# Patient Record
Sex: FEMALE | Race: WHITE | NOT HISPANIC OR LATINO | Employment: OTHER | URBAN - NONMETROPOLITAN AREA
[De-identification: names, ages, dates, MRNs, and addresses within clinical notes are randomized per-mention and may not be internally consistent; named-entity substitution may affect disease eponyms.]

---

## 2017-01-13 RX ORDER — LEVOTHYROXINE SODIUM 0.03 MG/1
TABLET ORAL
Qty: 30 TABLET | Refills: 6 | Status: SHIPPED | OUTPATIENT
Start: 2017-01-13 | End: 2017-07-14 | Stop reason: SDUPTHER

## 2017-01-13 RX ORDER — ALENDRONATE SODIUM 10 MG/1
TABLET ORAL
Qty: 30 TABLET | Refills: 0 | Status: SHIPPED | OUTPATIENT
Start: 2017-01-13 | End: 2017-02-09

## 2017-02-09 ENCOUNTER — OFFICE VISIT (OUTPATIENT)
Dept: FAMILY MEDICINE CLINIC | Facility: CLINIC | Age: 82
End: 2017-02-09

## 2017-02-09 VITALS
DIASTOLIC BLOOD PRESSURE: 78 MMHG | HEART RATE: 74 BPM | WEIGHT: 115.6 LBS | TEMPERATURE: 98.3 F | SYSTOLIC BLOOD PRESSURE: 124 MMHG | HEIGHT: 63 IN | BODY MASS INDEX: 20.48 KG/M2 | OXYGEN SATURATION: 98 %

## 2017-02-09 DIAGNOSIS — Z91.09 ENVIRONMENTAL ALLERGIES: ICD-10-CM

## 2017-02-09 DIAGNOSIS — M85.80 SENILE OSTEOPENIA: ICD-10-CM

## 2017-02-09 DIAGNOSIS — G89.29 CHRONIC BACK PAIN, UNSPECIFIED BACK LOCATION, UNSPECIFIED BACK PAIN LATERALITY: Primary | ICD-10-CM

## 2017-02-09 DIAGNOSIS — M25.551 ARTHRALGIA OF RIGHT HIP: ICD-10-CM

## 2017-02-09 DIAGNOSIS — M54.41 ACUTE MIDLINE LOW BACK PAIN WITH RIGHT-SIDED SCIATICA: ICD-10-CM

## 2017-02-09 DIAGNOSIS — M54.9 CHRONIC BACK PAIN, UNSPECIFIED BACK LOCATION, UNSPECIFIED BACK PAIN LATERALITY: Primary | ICD-10-CM

## 2017-02-09 PROCEDURE — 99214 OFFICE O/P EST MOD 30 MIN: CPT | Performed by: NURSE PRACTITIONER

## 2017-02-09 RX ORDER — HYDROCODONE BITARTRATE AND ACETAMINOPHEN 5; 325 MG/1; MG/1
1 TABLET ORAL 2 TIMES DAILY
Qty: 60 TABLET | Refills: 0 | Status: SHIPPED | OUTPATIENT
Start: 2017-02-09 | End: 2017-03-11

## 2017-02-09 RX ORDER — LORATADINE 10 MG/1
10 TABLET ORAL DAILY
Qty: 30 TABLET | Refills: 6 | Status: SHIPPED | OUTPATIENT
Start: 2017-02-09 | End: 2017-09-16 | Stop reason: SDUPTHER

## 2017-02-09 RX ORDER — GABAPENTIN 100 MG/1
100 CAPSULE ORAL 2 TIMES DAILY
Qty: 60 CAPSULE | Refills: 0 | Status: SHIPPED | OUTPATIENT
Start: 2017-02-09 | End: 2017-08-16 | Stop reason: SDUPTHER

## 2017-02-09 NOTE — PROGRESS NOTES
Subjective   Keyana Nguyen is a 89 y.o. female. Patient is here today for back pain. She states this is all over her back. This has been going on for about a year. The pain is not constant and she has done physical therapy twice but she feels like the pain has gotten worse recently and more spread out.     History of Present Illness 89-year-old  female presents to the office today with complaint of diffuse back pain that encompasses her entire back. She states it has been going on for approximately a year. Patient has done physical therapy twice but feels as though the pain is gotten worse recently. Has treated with tylenol, helps only for a few hours. Nothing makes better and movement makes worse. On a scale 1-10 rates her pain as 7-8, realizes she will have this pain for the rest of her life. Patient is in total control of her faculties. States she will only use the medicine when the pain is moderate to high and is always cautious of her surroundings . Patient also has senile osteopenia coupled with advanced age makes her pain very obvious in her day-to-day life.  Pt wants to decrease any meds that she can, we reviewed all meds and made adjustment. Previously diagnosed with gastric reflux patient no longer wishes to take that medicine protonics to be discontinued per patient request    Allergies, medications, problem list, medical history, surgical history, family history, social history, relevant encounters, immunizations, health maintenance all reviewed during this visit    Review of Systems   Constitutional: Positive for fatigue.   Musculoskeletal: Positive for arthralgias, back pain, gait problem and myalgias.        Osteopenia   All other systems reviewed and are negative.      Objective   Physical Exam   Constitutional: She is oriented to person, place, and time. She appears well-developed and well-nourished.   HENT:   Head: Normocephalic and atraumatic.   Eyes: EOM are normal. Pupils are equal,  "round, and reactive to light.   Neck: Normal range of motion.   Cardiovascular: Normal rate and regular rhythm.    Pulmonary/Chest: Effort normal and breath sounds normal.   Abdominal: Soft. Bowel sounds are normal.   Musculoskeletal:   Back pain is diffuse throughout her back still has some sciatica intermittently. States she understands this will be permanent for her lifetime. Is at peace with that. Realizes that she is starting to as she put it \"wind down \".   Neurological: She is alert and oriented to person, place, and time.   Fully intact knows exactly what she wants and what she does not want. Knows all of her medications, what they are for, and when they are due.   Skin: Skin is warm and dry.   Psychiatric: She has a normal mood and affect. Her behavior is normal. Judgment and thought content normal.   Nursing note and vitals reviewed.      Assessment/Plan   Keyana was seen today for back pain.    Diagnoses and all orders for this visit:    Chronic back pain, unspecified back location, unspecified back pain laterality  -     HYDROcodone-acetaminophen (NORCO) 5-325 MG per tablet; Take 1 tablet by mouth 2 (Two) Times a Day for 30 days.  -     gabapentin (NEURONTIN) 100 MG capsule; Take 1 capsule by mouth 2 (Two) Times a Day.  -     Compliance Drug Analysis, Ur; Future    Acute midline low back pain with right-sided sciatica  -     HYDROcodone-acetaminophen (NORCO) 5-325 MG per tablet; Take 1 tablet by mouth 2 (Two) Times a Day for 30 days.  -     Compliance Drug Analysis, Ur; Future    Senile osteopenia    Arthralgia of right hip  -     HYDROcodone-acetaminophen (NORCO) 5-325 MG per tablet; Take 1 tablet by mouth 2 (Two) Times a Day for 30 days.    Environmental allergies  -     loratadine (CLARITIN) 10 MG tablet; Take 1 tablet by mouth Daily.     Will treat patient's chronic pain with hydrocodone 5/325 mg twice a day- once in the morning to get her through her day and then before bed so she can sleep. " Patient has asked that her gabapentin be cut down from 3 tablets a day to twice a day. I agreed and wrote the new prescription. Patient discontinued medicines for osteopenia because she feels that she is starting to slow down and would not benefit by taking it. Also stopped her meloxicam since she has the Norco and gabapentin; stated she did not feel she would need the extra meds. Patient also discontinued Protonix since she said she is not plagued with heartburn and since she is not taking the meloxicam could not justify the cost. Patient did request Claritin for her allergic rhinitis. Patient also discussed with me getting a handicap placard since her back pain is such that its difficult for her to walk into the store from the back of the parking lot. She will go to the courthouse get the form and I will sign. Will follow-up every 6 months for routine meds and as needed on the Norco. Drug urine test to be collected when she can void. Ian ordered and med agreement current.    Much of this encounter note is an electronic transcription/translation of spoken language to printed text. The electronic translation of spoken language may permit erroneous, or at times, nonsensical words or phrases to be inadvertently transcribed; although I have reviewed the note for such errors, some may still exist.

## 2017-05-10 ENCOUNTER — OFFICE VISIT (OUTPATIENT)
Dept: CARDIOLOGY | Facility: CLINIC | Age: 82
End: 2017-05-10

## 2017-05-10 VITALS
DIASTOLIC BLOOD PRESSURE: 78 MMHG | HEART RATE: 81 BPM | HEIGHT: 63 IN | WEIGHT: 118 LBS | SYSTOLIC BLOOD PRESSURE: 130 MMHG | BODY MASS INDEX: 20.91 KG/M2

## 2017-05-10 DIAGNOSIS — I35.1 NONRHEUMATIC AORTIC VALVE INSUFFICIENCY: ICD-10-CM

## 2017-05-10 DIAGNOSIS — I48.20 CHRONIC ATRIAL FIBRILLATION (HCC): Primary | ICD-10-CM

## 2017-05-10 DIAGNOSIS — R53.82 CHRONIC FATIGUE: ICD-10-CM

## 2017-05-10 DIAGNOSIS — I34.0 NON-RHEUMATIC MITRAL REGURGITATION: ICD-10-CM

## 2017-05-10 PROCEDURE — 93000 ELECTROCARDIOGRAM COMPLETE: CPT | Performed by: NURSE PRACTITIONER

## 2017-05-10 PROCEDURE — 99214 OFFICE O/P EST MOD 30 MIN: CPT | Performed by: NURSE PRACTITIONER

## 2017-05-11 PROBLEM — I48.92 ATRIAL FLUTTER: Status: ACTIVE | Noted: 2017-05-11

## 2017-06-05 ENCOUNTER — OFFICE VISIT (OUTPATIENT)
Dept: FAMILY MEDICINE CLINIC | Facility: CLINIC | Age: 82
End: 2017-06-05

## 2017-06-05 VITALS
BODY MASS INDEX: 21.4 KG/M2 | WEIGHT: 120.8 LBS | TEMPERATURE: 98.4 F | HEIGHT: 63 IN | SYSTOLIC BLOOD PRESSURE: 112 MMHG | OXYGEN SATURATION: 97 % | DIASTOLIC BLOOD PRESSURE: 80 MMHG | HEART RATE: 80 BPM

## 2017-06-05 DIAGNOSIS — Z00.00 MEDICARE ANNUAL WELLNESS VISIT, INITIAL: Primary | ICD-10-CM

## 2017-06-05 PROCEDURE — G0438 PPPS, INITIAL VISIT: HCPCS | Performed by: NURSE PRACTITIONER

## 2017-06-05 NOTE — PATIENT INSTRUCTIONS
Medicare Wellness  Personal Prevention Plan of Service     Date of Office Visit:  2017  Encounter Provider:  DUSTIN Navarro  Place of Service:  [facility]  Patient Name: Keyana Nguyen  :  1925    As part of the Medicare Wellness portion of your visit today, we are providing you with this personalized preventive plan of services (PPPS). This plan is based upon recommendations of the [place] Preventive Services Task Force (USPSTF) and the Advisory Committee on Immunization Practices (ACIP).    This lists the preventive care services that should be considered, and provides dates of when you are due. Items listed as completed are up-to-date and do not require any further intervention.    Health Maintenance   Topic Date Due   • TDAP/TD VACCINES (1 - Tdap) 1944   • PNEUMOCOCCAL VACCINES (65+ LOW/MEDIUM RISK) (1 of 2 - PCV13) 1990   • ZOSTER VACCINE  2016   • INFLUENZA VACCINE  2017   • MAMMOGRAM  2018   • DXA SCAN  2018       No orders of the defined types were placed in this encounter.    Patient will follow-up with office every 6 months and as needed.

## 2017-06-05 NOTE — PROGRESS NOTES
QUICK REFERENCE INFORMATION:  The ABCs of the Annual Wellness Visit    Subsequent Medicare Wellness Visit    HEALTH RISK ASSESSMENT    11/20/1925    Recent Hospitalizations:  No hospitalization(s) within the last year..        Current Medical Providers:  Patient Care Team:  DUSTIN Dick as PCP - General  DUSTIN Dick as PCP - Family Medicine        Smoking Status:  History   Smoking Status   • Never Smoker   Smokeless Tobacco   • Never Used     Comment: caffeine use       Alcohol Consumption:  History   Alcohol Use No       Depression Screen:   PHQ-9 Depression Screening 6/5/2017   Little interest or pleasure in doing things 0   Feeling down, depressed, or hopeless 0   Trouble falling or staying asleep, or sleeping too much 1   Feeling tired or having little energy 1   Poor appetite or overeating 0   Feeling bad about yourself - or that you are a failure or have let yourself or your family down 0   Trouble concentrating on things, such as reading the newspaper or watching television 0   Moving or speaking so slowly that other people could have noticed. Or the opposite - being so fidgety or restless that you have been moving around a lot more than usual 0   Thoughts that you would be better off dead, or of hurting yourself in some way 0   PHQ-9 Total Score 2   If you checked off any problems, how difficult have these problems made it for you to do your work, take care of things at home, or get along with other people? Not difficult at all       Health Habits and Functional and Cognitive Screening:  Functional & Cognitive Status 6/5/2017   Do you have difficulty preparing food and eating? No   Do you have difficulty bathing yourself? No   Do you have difficulty getting dressed? No   Do you have difficulty using the toilet? No   Do you have difficulty moving around from place to place? No   In the past year have you fallen or experienced a near fall? Yes   Do you need help  using the phone?  No   Are you deaf or do you have serious difficulty hearing?  No   Do you need help with transportation? No   Do you need help shopping? No   Do you need help preparing meals?  No   Do you need help with housework?  No   Do you need help with laundry? No   Do you need help taking your medications? No   Do you need help managing money? No   Do you have difficulty concentrating, remembering or making decisions? No       Health Habits  Current Diet: Well Balanced Diet  Dental Exam: Up to date  Eye Exam: Up to date  Exercise (times per week): 3 times per week  Current Exercise Activities Include: Walking      Does the patient have evidence of cognitive impairment? No    Aspirin use counseling: Does not need ASA (and currently is not on it)      Recent Lab Results:  CMP:  Lab Results   Component Value Date    GLU 73 12/08/2015    BUN 23 12/08/2015    CREATININE 0.89 12/08/2015    EGFRIFNONA 57 (L) 12/08/2015    EGFRIFAFRI 66 12/08/2015    BCR 26 12/08/2015     12/08/2015    K 4.7 12/08/2015    CO2 24 12/08/2015    CALCIUM 8.9 12/08/2015    PROTENTOTREF 5.8 (L) 12/08/2015    ALBUMIN 3.9 12/08/2015    LABGLOBREF 1.9 12/08/2015    LABIL2 2.1 12/08/2015    BILITOT 0.7 12/08/2015    ALKPHOS 68 12/08/2015    AST 15 12/08/2015    ALT 16 12/08/2015     Lipid Panel:  Lab Results   Component Value Date    CHLPL 160 12/08/2015    TRIG 68 12/08/2015    HDL 75 12/08/2015    VLDL 14 12/08/2015    LDL 71 12/08/2015     HbA1c:       Visual Acuity:  No exam data present    Age-appropriate Screening Schedule:  Refer to the list below for future screening recommendations based on patient's age, sex and/or medical conditions. Orders for these recommended tests are listed in the plan section. The patient has been provided with a written plan.    Health Maintenance   Topic Date Due   • TDAP/TD VACCINES (1 - Tdap) 11/20/1944   • PNEUMOCOCCAL VACCINES (65+ LOW/MEDIUM RISK) (1 of 2 - PCV13) 11/20/1990   • ZOSTER VACCINE   01/19/2016   • INFLUENZA VACCINE  08/01/2017   • MAMMOGRAM  01/14/2018   • DXA SCAN  01/14/2018        Subjective   History of Present Illness    Keyana Nguyen is a 89 y.o. female who presents for an Subsequent Wellness Visit.    The following portions of the patient's history were reviewed and updated as appropriate: allergies, current medications, past family history, past medical history, past social history, past surgical history and problem list.    Outpatient Medications Prior to Visit   Medication Sig Dispense Refill   • Biotin 5000 MCG capsule Take 1 capsule by mouth daily.     • Cholecalciferol (VITAMIN D) 1000 UNITS tablet Take 1 tablet by mouth daily.     • ELIQUIS 2.5 MG tablet tablet TAKE ONE TABLET BY MOUTH TWO TIMES A  tablet 3   • gabapentin (NEURONTIN) 100 MG capsule Take 1 capsule by mouth 2 (Two) Times a Day. 60 capsule 0   • levothyroxine (SYNTHROID, LEVOTHROID) 25 MCG tablet Take 1 tablet by mouth daily. 30 tablet 6   • lisinopril (PRINIVIL,ZESTRIL) 10 MG tablet Take 1 tablet by mouth daily. 90 tablet 3   • loratadine (CLARITIN) 10 MG tablet Take 1 tablet by mouth Daily. 30 tablet 6   • Multiple Vitamins-Minerals (CENTRUM SILVER) tablet Take 1 tablet by mouth daily.     • vitamin C (ASCORBIC ACID) 500 MG tablet Take 1 tablet by mouth daily.       No facility-administered medications prior to visit.        Patient Active Problem List   Diagnosis   • Lower respiratory infection (e.g., bronchitis, pneumonia, pneumonitis, pulmonitis)   • Aortic valve insufficiency   • Atrial fibrillation   • Fatigue   • Hypothyroidism   • Irritable bowel syndrome   • Mitral valve insufficiency   • Senile osteopenia   • Cobalamin deficiency   • Vitamin D deficiency   • Arthralgia of right hip   • Acute midline low back pain with right-sided sciatica   • Rash   • Arthritis   • Herpes zoster with complication   • Chronic back pain   • Atrial flutter       Advance Care Planning:  has an advance directive - a  "copy HAS NOT been provided. Have asked the patient to send this to us to add to record.    Identification of Risk Factors:  Risk factors include: increased fall risk.    Review of Systems   All other systems reviewed and are negative.      Compared to one year ago, the patient feels her physical health is the same. Can tell that she is slightly weaker than last year. Plans to move into an assisted care facility in [place]. Wishes to remain are patient.  Compared to one year ago, the patient feels her mental health is the same.    Objective     Physical Exam    Vitals:    06/05/17 1302   BP: 112/80   BP Location: Left arm   Pulse: 80   Temp: 98.4 °F (36.9 °C)   TempSrc: Oral   SpO2: 97%   Weight: 120 lb 12.8 oz (54.8 kg)   Height: 63\" (160 cm)   PainSc: 0-No pain       Body mass index is 21.4 kg/(m^2).  Discussed the patient's BMI with her. The BMI is in the acceptable range.    Assessment/Plan   Patient Self-Management and Personalized Health Advice  The patient has been provided with information about: fall prevention and preventive services including:   · Fall Risk assessment done, Fall Risk plan of care done.    Visit Diagnoses:  No diagnosis found.    No orders of the defined types were placed in this encounter.      Outpatient Encounter Prescriptions as of 6/5/2017   Medication Sig Dispense Refill   • Biotin 5000 MCG capsule Take 1 capsule by mouth daily.     • Cholecalciferol (VITAMIN D) 1000 UNITS tablet Take 1 tablet by mouth daily.     • ELIQUIS 2.5 MG tablet tablet TAKE ONE TABLET BY MOUTH TWO TIMES A  tablet 3   • gabapentin (NEURONTIN) 100 MG capsule Take 1 capsule by mouth 2 (Two) Times a Day. 60 capsule 0   • levothyroxine (SYNTHROID, LEVOTHROID) 25 MCG tablet Take 1 tablet by mouth daily. 30 tablet 6   • lisinopril (PRINIVIL,ZESTRIL) 10 MG tablet Take 1 tablet by mouth daily. 90 tablet 3   • loratadine (CLARITIN) 10 MG tablet Take 1 tablet by mouth Daily. 30 tablet 6   • Multiple " Vitamins-Minerals (CENTRUM SILVER) tablet Take 1 tablet by mouth daily.     • vitamin C (ASCORBIC ACID) 500 MG tablet Take 1 tablet by mouth daily.       No facility-administered encounter medications on file as of 6/5/2017.        Reviewed use of high risk medication in the elderly: yes  Reviewed for potential of harmful drug interactions in the elderly: yes    Follow Up:  Every 6 months and PRN     An After Visit Summary and PPPS with all of these plans were given to the patient. Pt aware of need for vaccines to be given at local pharmacy.

## 2017-07-14 RX ORDER — LISINOPRIL 10 MG/1
TABLET ORAL
Qty: 90 TABLET | Refills: 3 | Status: SHIPPED | OUTPATIENT
Start: 2017-07-14 | End: 2017-08-03 | Stop reason: SDUPTHER

## 2017-07-14 RX ORDER — LEVOTHYROXINE SODIUM 0.03 MG/1
TABLET ORAL
Qty: 30 TABLET | Refills: 0 | Status: SHIPPED | OUTPATIENT
Start: 2017-07-14 | End: 2017-08-04 | Stop reason: SDUPTHER

## 2017-07-15 DIAGNOSIS — M19.90 ARTHRITIS: ICD-10-CM

## 2017-07-15 DIAGNOSIS — B02.8 HERPES ZOSTER WITH COMPLICATION: ICD-10-CM

## 2017-07-18 DIAGNOSIS — M54.40 CHRONIC BILATERAL LOW BACK PAIN WITH SCIATICA, SCIATICA LATERALITY UNSPECIFIED: Primary | ICD-10-CM

## 2017-07-18 DIAGNOSIS — G89.29 CHRONIC BILATERAL LOW BACK PAIN WITH SCIATICA, SCIATICA LATERALITY UNSPECIFIED: Primary | ICD-10-CM

## 2017-07-18 RX ORDER — GABAPENTIN 100 MG/1
CAPSULE ORAL
Qty: 90 CAPSULE | Refills: 3 | Status: SHIPPED | OUTPATIENT
Start: 2017-07-18 | End: 2017-08-03 | Stop reason: SDUPTHER

## 2017-07-18 NOTE — TELEPHONE ENCOUNTER
Patient needs office visit to signed medication agreement and give urine test. Has med agreement for Norco but not gabapentin.

## 2017-08-03 ENCOUNTER — OFFICE VISIT (OUTPATIENT)
Dept: FAMILY MEDICINE CLINIC | Facility: CLINIC | Age: 82
End: 2017-08-03

## 2017-08-03 VITALS
DIASTOLIC BLOOD PRESSURE: 82 MMHG | BODY MASS INDEX: 20.91 KG/M2 | SYSTOLIC BLOOD PRESSURE: 100 MMHG | OXYGEN SATURATION: 98 % | HEIGHT: 63 IN | TEMPERATURE: 98 F | HEART RATE: 82 BPM | WEIGHT: 118 LBS

## 2017-08-03 DIAGNOSIS — I48.20 CHRONIC ATRIAL FIBRILLATION (HCC): ICD-10-CM

## 2017-08-03 DIAGNOSIS — M54.9 CHRONIC BACK PAIN, UNSPECIFIED BACK LOCATION, UNSPECIFIED BACK PAIN LATERALITY: ICD-10-CM

## 2017-08-03 DIAGNOSIS — E53.8 B12 DEFICIENCY: ICD-10-CM

## 2017-08-03 DIAGNOSIS — G89.29 CHRONIC BACK PAIN, UNSPECIFIED BACK LOCATION, UNSPECIFIED BACK PAIN LATERALITY: ICD-10-CM

## 2017-08-03 DIAGNOSIS — E55.9 VITAMIN D DEFICIENCY: ICD-10-CM

## 2017-08-03 DIAGNOSIS — E03.9 ACQUIRED HYPOTHYROIDISM: Primary | ICD-10-CM

## 2017-08-03 LAB
25(OH)D3+25(OH)D2 SERPL-MCNC: 70.2 NG/ML (ref 30–100)
ALBUMIN SERPL-MCNC: 4.4 G/DL (ref 3.5–5.2)
ALBUMIN/GLOB SERPL: 2 G/DL
ALP SERPL-CCNC: 68 U/L (ref 39–117)
ALT SERPL-CCNC: 18 U/L (ref 1–33)
AST SERPL-CCNC: 20 U/L (ref 1–32)
BASOPHILS # BLD AUTO: 0.03 10*3/MM3 (ref 0–0.2)
BASOPHILS NFR BLD AUTO: 0.4 % (ref 0–1.5)
BILIRUB SERPL-MCNC: 0.5 MG/DL (ref 0.1–1.2)
BUN SERPL-MCNC: 15 MG/DL (ref 8–23)
BUN/CREAT SERPL: 17.6 (ref 7–25)
CALCIUM SERPL-MCNC: 9.9 MG/DL (ref 8.2–9.6)
CHLORIDE SERPL-SCNC: 103 MMOL/L (ref 98–107)
CHOLEST SERPL-MCNC: 161 MG/DL (ref 0–200)
CO2 SERPL-SCNC: 26.3 MMOL/L (ref 22–29)
CREAT SERPL-MCNC: 0.85 MG/DL (ref 0.57–1)
EOSINOPHIL # BLD AUTO: 0.1 10*3/MM3 (ref 0–0.7)
EOSINOPHIL NFR BLD AUTO: 1.4 % (ref 0.3–6.2)
ERYTHROCYTE [DISTWIDTH] IN BLOOD BY AUTOMATED COUNT: 13 % (ref 11.7–13)
GLOBULIN SER CALC-MCNC: 2.2 GM/DL
GLUCOSE SERPL-MCNC: 89 MG/DL (ref 65–99)
HCT VFR BLD AUTO: 44.6 % (ref 35.6–45.5)
HDLC SERPL-MCNC: 61 MG/DL (ref 40–60)
HGB BLD-MCNC: 14.4 G/DL (ref 11.9–15.5)
IMM GRANULOCYTES # BLD: 0 10*3/MM3 (ref 0–0.03)
IMM GRANULOCYTES NFR BLD: 0 % (ref 0–0.5)
LDLC SERPL CALC-MCNC: 85 MG/DL (ref 0–100)
LYMPHOCYTES # BLD AUTO: 1.91 10*3/MM3 (ref 0.9–4.8)
LYMPHOCYTES NFR BLD AUTO: 26.3 % (ref 19.6–45.3)
MCH RBC QN AUTO: 30.7 PG (ref 26.9–32)
MCHC RBC AUTO-ENTMCNC: 32.3 G/DL (ref 32.4–36.3)
MCV RBC AUTO: 95.1 FL (ref 80.5–98.2)
MONOCYTES # BLD AUTO: 0.75 10*3/MM3 (ref 0.2–1.2)
MONOCYTES NFR BLD AUTO: 10.3 % (ref 5–12)
NEUTROPHILS # BLD AUTO: 4.48 10*3/MM3 (ref 1.9–8.1)
NEUTROPHILS NFR BLD AUTO: 61.6 % (ref 42.7–76)
PLATELET # BLD AUTO: 212 10*3/MM3 (ref 140–500)
POTASSIUM SERPL-SCNC: 4.8 MMOL/L (ref 3.5–5.2)
PROT SERPL-MCNC: 6.6 G/DL (ref 6–8.5)
RBC # BLD AUTO: 4.69 10*6/MM3 (ref 3.9–5.2)
SODIUM SERPL-SCNC: 142 MMOL/L (ref 136–145)
TRIGL SERPL-MCNC: 75 MG/DL (ref 0–150)
TSH SERPL DL<=0.005 MIU/L-ACNC: 2.94 MIU/ML (ref 0.27–4.2)
VIT B12 SERPL-MCNC: 183 PG/ML (ref 211–946)
VLDLC SERPL CALC-MCNC: 15 MG/DL (ref 5–40)
WBC # BLD AUTO: 7.27 10*3/MM3 (ref 4.5–10.7)

## 2017-08-03 PROCEDURE — 99214 OFFICE O/P EST MOD 30 MIN: CPT | Performed by: NURSE PRACTITIONER

## 2017-08-03 RX ORDER — LISINOPRIL 10 MG/1
10 TABLET ORAL DAILY
Qty: 90 TABLET | Refills: 3 | Status: SHIPPED | OUTPATIENT
Start: 2017-08-03 | End: 2017-08-31 | Stop reason: DRUGHIGH

## 2017-08-03 NOTE — PROGRESS NOTES
Subjective   Keyana Nguyen is a 89 y.o. female. Thyroid disease, blood pressure, back pain to be followed up on this visit    History of Present Illness 89-year-old  female with hypothyroidism, chronic atrial fibrillation and back pain using long-term medications to address these issues. 4 hypothyroidism uses levothyroxine 25 µg tablet per day, for chronic atrial fibrillation using lisinopril 10 mg tablet per day, L Bond 2.5 mg tablet and for back pain using gabapentin 100 mg capsules 3 times a day as needed. States medications are working well offers no complaints. Vital signs are stable today with /82, heart rate 82 temperature 90.8 degrees O2 saturation at rest on room air 98%. Patient has lost 2 pounds since her last visit of June 5, 2017.Pt fasting for routine labs.    The following portions of the patient's history were reviewed and updated as appropriate: allergies, current medications, past family history, past medical history, past social history, past surgical history and problem list.    Review of Systems   Cardiovascular:        Aortic valve insufficiency, atrial fibrillation, atrial flutter, mitral valve insufficiency   Endocrine:        Hypothyroidism    Musculoskeletal: Positive for back pain.        Chronic generalized arthritis worse in the back   Allergic/Immunologic:        Vitamin D deficiency   All other systems reviewed and are negative.      Objective   Physical Exam   Constitutional: She is oriented to person, place, and time. She appears well-developed and well-nourished.   Thin but within the normal range   HENT:   Head: Normocephalic and atraumatic.   Eyes: EOM are normal. Pupils are equal, round, and reactive to light.   Glasses   Neck: Normal range of motion. Neck supple.   Cardiovascular: Normal rate.    Irregularly irregular rhythm. Patient not affected.   Pulmonary/Chest: Effort normal and breath sounds normal.   Abdominal: Soft.   Musculoskeletal: Normal range of  motion.   For her advanced age in very good condition.   Neurological: She is alert and oriented to person, place, and time.   Skin: Skin is warm and dry.   Psychiatric: She has a normal mood and affect. Her behavior is normal. Judgment and thought content normal.   Cheerful and engaging. Good conversation about her health and future.   Nursing note and vitals reviewed.      Assessment/Plan   Keyana was seen today for follow-up.    Diagnoses and all orders for this visit:    Acquired hypothyroidism  -     CBC & Differential  -     Comprehensive Metabolic Panel  -     Lipid Panel  -     TSH    Chronic back pain, unspecified back location, unspecified back pain laterality  -     CBC & Differential  -     Comprehensive Metabolic Panel  -     Lipid Panel  -     TSH    Chronic atrial fibrillation  -     CBC & Differential  -     Comprehensive Metabolic Panel  -     Lipid Panel  -     TSH  -     lisinopril (PRINIVIL,ZESTRIL) 10 MG tablet; Take 1 tablet by mouth Daily.    Vitamin D deficiency  -     Vitamin D 25 Hydroxy    B12 deficiency  -     Vitamin B12     Tdap and shingles vaccine need to be given through outside pharmacy to be compliant with Medicare regulations for payment, patient aware. Labs drawn today will be called once resulted. Medications renewed as needed. Patient to low-fat high-fiber diet full of fruits and vegetables drinking six-day glasses water a day and exercising by walking for 30 minutes every day. This can be broken up into 3,10 minute brisk walks around the house. Patient has recently moved and says she has gotten off of her exercise routine but will soon be resuming it. To notify office immediately of any decrease in health status. To continue taking all medications as prescribed. To F/U every 6 months and PRN. All healthcare maintenance issues fully addressed and all questions answered.

## 2017-08-04 DIAGNOSIS — E03.9 ACQUIRED HYPOTHYROIDISM: Primary | ICD-10-CM

## 2017-08-04 DIAGNOSIS — E53.8 VITAMIN B12 DEFICIENCY: Primary | ICD-10-CM

## 2017-08-04 RX ORDER — LEVOTHYROXINE SODIUM 0.03 MG/1
25 TABLET ORAL DAILY
Qty: 30 TABLET | Refills: 6 | Status: SHIPPED | OUTPATIENT
Start: 2017-08-04 | End: 2018-01-11 | Stop reason: SDUPTHER

## 2017-08-16 DIAGNOSIS — G89.29 CHRONIC BACK PAIN, UNSPECIFIED BACK LOCATION, UNSPECIFIED BACK PAIN LATERALITY: ICD-10-CM

## 2017-08-16 DIAGNOSIS — M54.9 CHRONIC BACK PAIN, UNSPECIFIED BACK LOCATION, UNSPECIFIED BACK PAIN LATERALITY: ICD-10-CM

## 2017-08-17 RX ORDER — GABAPENTIN 100 MG/1
CAPSULE ORAL
Qty: 90 CAPSULE | Refills: 0 | OUTPATIENT
Start: 2017-08-17 | End: 2017-10-23 | Stop reason: SDUPTHER

## 2017-08-25 ENCOUNTER — TELEPHONE (OUTPATIENT)
Dept: CARDIOLOGY | Facility: CLINIC | Age: 82
End: 2017-08-25

## 2017-08-25 NOTE — TELEPHONE ENCOUNTER
Pt called and states that pt was hospitalize for pneumonia 8/19/17 and when she was in the hospital her BP elevated to 165/102. Pt was d/c aug 23, 2017.     Pt BP reading at home:    8/24/17 7:15 /91  HR: 87    11:05 /83 HR:94              4: 138/93 HR: 88    8/25/17   7AM 150/93 HR:90     9AM 141/95  HR: 116     1PM  144/83  HR: 98     Pt current med:    Lisinopril 10 mg daily  eliquis 2.5mg daily       Please advise    Larissa CLIFFORD

## 2017-08-31 ENCOUNTER — OFFICE VISIT (OUTPATIENT)
Dept: FAMILY MEDICINE CLINIC | Facility: CLINIC | Age: 82
End: 2017-08-31

## 2017-08-31 VITALS
HEIGHT: 63 IN | SYSTOLIC BLOOD PRESSURE: 106 MMHG | HEART RATE: 97 BPM | WEIGHT: 118.4 LBS | BODY MASS INDEX: 20.98 KG/M2 | TEMPERATURE: 97.5 F | DIASTOLIC BLOOD PRESSURE: 70 MMHG | OXYGEN SATURATION: 97 %

## 2017-08-31 DIAGNOSIS — I10 ESSENTIAL HYPERTENSION: ICD-10-CM

## 2017-08-31 DIAGNOSIS — J13 PNEUMONIA OF RIGHT LOWER LOBE DUE TO STREPTOCOCCUS PNEUMONIAE (HCC): Primary | ICD-10-CM

## 2017-08-31 DIAGNOSIS — I48.20 CHRONIC ATRIAL FIBRILLATION (HCC): ICD-10-CM

## 2017-08-31 PROCEDURE — 99213 OFFICE O/P EST LOW 20 MIN: CPT | Performed by: NURSE PRACTITIONER

## 2017-08-31 RX ORDER — L. ACIDOPHILUS/L.BULGARICUS 1MM CELL
TABLET ORAL
Refills: 0 | COMMUNITY
Start: 2017-08-23 | End: 2022-08-17

## 2017-08-31 RX ORDER — LISINOPRIL 20 MG/1
20 TABLET ORAL DAILY
Qty: 90 TABLET | Refills: 3 | Status: SHIPPED | OUTPATIENT
Start: 2017-08-31 | End: 2017-09-28 | Stop reason: SDUPTHER

## 2017-08-31 RX ORDER — AZITHROMYCIN 250 MG/1
TABLET, FILM COATED ORAL
Refills: 0 | COMMUNITY
Start: 2017-08-23 | End: 2017-12-19

## 2017-09-16 DIAGNOSIS — Z91.09 ENVIRONMENTAL ALLERGIES: ICD-10-CM

## 2017-09-18 RX ORDER — LORATADINE 10 MG/1
TABLET ORAL
Qty: 30 TABLET | Refills: 6 | Status: SHIPPED | OUTPATIENT
Start: 2017-09-18 | End: 2018-03-09

## 2017-09-28 DIAGNOSIS — I10 ESSENTIAL HYPERTENSION: ICD-10-CM

## 2017-09-28 RX ORDER — LISINOPRIL 20 MG/1
20 TABLET ORAL DAILY
Qty: 90 TABLET | Refills: 3 | Status: SHIPPED | OUTPATIENT
Start: 2017-09-28 | End: 2018-01-09 | Stop reason: SDUPTHER

## 2017-10-23 DIAGNOSIS — G89.29 CHRONIC BACK PAIN, UNSPECIFIED BACK LOCATION, UNSPECIFIED BACK PAIN LATERALITY: ICD-10-CM

## 2017-10-23 DIAGNOSIS — M54.9 CHRONIC BACK PAIN, UNSPECIFIED BACK LOCATION, UNSPECIFIED BACK PAIN LATERALITY: ICD-10-CM

## 2017-10-23 RX ORDER — GABAPENTIN 100 MG/1
CAPSULE ORAL
Qty: 90 CAPSULE | Refills: 0 | OUTPATIENT
Start: 2017-10-23 | End: 2017-11-16 | Stop reason: SDUPTHER

## 2017-11-16 DIAGNOSIS — G89.29 CHRONIC BACK PAIN, UNSPECIFIED BACK LOCATION, UNSPECIFIED BACK PAIN LATERALITY: ICD-10-CM

## 2017-11-16 DIAGNOSIS — M54.9 CHRONIC BACK PAIN, UNSPECIFIED BACK LOCATION, UNSPECIFIED BACK PAIN LATERALITY: ICD-10-CM

## 2017-11-16 RX ORDER — GABAPENTIN 100 MG/1
CAPSULE ORAL
Qty: 90 CAPSULE | Refills: 0 | OUTPATIENT
Start: 2017-11-16 | End: 2018-01-11 | Stop reason: SDUPTHER

## 2017-12-19 ENCOUNTER — OFFICE VISIT (OUTPATIENT)
Dept: FAMILY MEDICINE CLINIC | Facility: CLINIC | Age: 82
End: 2017-12-19

## 2017-12-19 VITALS
TEMPERATURE: 98.1 F | BODY MASS INDEX: 21.26 KG/M2 | SYSTOLIC BLOOD PRESSURE: 104 MMHG | WEIGHT: 120 LBS | HEIGHT: 63 IN | HEART RATE: 88 BPM | OXYGEN SATURATION: 98 % | DIASTOLIC BLOOD PRESSURE: 68 MMHG

## 2017-12-19 DIAGNOSIS — J06.9 ACUTE URI: Primary | ICD-10-CM

## 2017-12-19 PROCEDURE — 99213 OFFICE O/P EST LOW 20 MIN: CPT | Performed by: PHYSICIAN ASSISTANT

## 2017-12-19 RX ORDER — GUAIFENESIN 600 MG/1
1200 TABLET, EXTENDED RELEASE ORAL 2 TIMES DAILY
COMMUNITY
End: 2018-05-10 | Stop reason: HOSPADM

## 2017-12-19 RX ORDER — AZITHROMYCIN 250 MG/1
TABLET, FILM COATED ORAL
Qty: 6 TABLET | Refills: 0 | Status: SHIPPED | OUTPATIENT
Start: 2017-12-19 | End: 2018-02-09

## 2017-12-19 NOTE — PROGRESS NOTES
Subjective   Keyana Nguyen is a 89 y.o. female seen today due to cough started on Friday taking Mucinex twice a day since Friday    History of Present Illness     STARTED 12/15- HAS BEEN TAKING MUCINEX AND WASN'T GETTING ANY BETTER. NO SEVERE COUGH BUT COUGHING SOME- NO PRODUCTION. HAS COUGHING EPISODES BUT NO PRODUCTION. YESTERDAY- PROD VERY THICK PHLEGM- YELLOWISH IN COLOR. OTHERWISE, NO SIGNIFICANT SYMPTOMS.     8/017- PNEUMONIA AND HOSPITALIZED. TREATED SUCCESSFULLY WITH ZPACK.     The following portions of the patient's history were reviewed and updated as appropriate: allergies, current medications, past family history, past medical history, past social history, past surgical history and problem list.    Review of Systems   Respiratory: Positive for cough.    All other systems reviewed and are negative.      Objective   Physical Exam   Constitutional: She is oriented to person, place, and time. Vital signs are normal. She appears well-developed and well-nourished.   HENT:   Head: Normocephalic and atraumatic.   Right Ear: Tympanic membrane, external ear and ear canal normal.   Left Ear: Tympanic membrane, external ear and ear canal normal.   Nose: Nose normal.   Mouth/Throat: Uvula is midline, oropharynx is clear and moist and mucous membranes are normal.   Eyes: Conjunctivae are normal.   Neck: Neck supple.   Cardiovascular: Normal rate, regular rhythm and normal heart sounds.  Exam reveals no gallop and no friction rub.    No murmur heard.  Pulmonary/Chest: Effort normal and breath sounds normal. She has no wheezes. She has no rhonchi. She has no rales.   Neurological: She is alert and oriented to person, place, and time.   Skin: Skin is warm and dry.   Psychiatric: She has a normal mood and affect. Her speech is normal and behavior is normal. Judgment and thought content normal. Cognition and memory are normal.   Nursing note and vitals reviewed.      Assessment/Plan   Keyana was seen today for  cough.    Diagnoses and all orders for this visit:    Acute URI    Other orders  -     azithromycin (ZITHROMAX Z-DEON) 250 MG tablet; Take 2 tablets the first day, then 1 tablet daily for 4 days.      Patient Instructions   89 YEAR OLD FEMALE WHO PRESENTS TODAY WITH COUGH X 4 DAYS. SHE DENIES OTHER SIGNIFICANT SYMPTOMS. PATIENT WITH RECENT HOSPITALIZATION FOR PNEUMONIA 8/2017. I WILL GIVE ZPACK FOR ATYPICAL CAP COVERAGE. SHE SHOULD CONTINUE WITH MUCINEX DM TWICE DAILY AND NASACORT OR FLONASE 2 SPRAYS IN EACH NOSTRIL ONCE DAILY. TO BE SEEN ASAP IF WORSENING, NO IMPROVEMENT, OR NEW SYMPTOMS.

## 2017-12-22 NOTE — PATIENT INSTRUCTIONS
89 YEAR OLD FEMALE WHO PRESENTS TODAY WITH COUGH X 4 DAYS. SHE DENIES OTHER SIGNIFICANT SYMPTOMS. PATIENT WITH RECENT HOSPITALIZATION FOR PNEUMONIA 8/2017. I WILL GIVE ZPACK FOR ATYPICAL CAP COVERAGE. SHE SHOULD CONTINUE WITH MUCINEX DM TWICE DAILY AND NASACORT OR FLONASE 2 SPRAYS IN EACH NOSTRIL ONCE DAILY. TO BE SEEN ASAP IF WORSENING, NO IMPROVEMENT, OR NEW SYMPTOMS.

## 2018-01-09 ENCOUNTER — TELEPHONE (OUTPATIENT)
Dept: FAMILY MEDICINE CLINIC | Facility: CLINIC | Age: 83
End: 2018-01-09

## 2018-01-09 DIAGNOSIS — I10 ESSENTIAL HYPERTENSION: ICD-10-CM

## 2018-01-09 RX ORDER — LISINOPRIL 20 MG/1
20 TABLET ORAL DAILY
Qty: 90 TABLET | Refills: 1 | Status: SHIPPED | OUTPATIENT
Start: 2018-01-09 | End: 2018-05-10

## 2018-01-09 NOTE — TELEPHONE ENCOUNTER
Patient called requesting a 90 day script for Gabapentin and Levothyroxine be sent to [facility], okay?

## 2018-01-11 DIAGNOSIS — M54.9 CHRONIC BACK PAIN, UNSPECIFIED BACK LOCATION, UNSPECIFIED BACK PAIN LATERALITY: ICD-10-CM

## 2018-01-11 DIAGNOSIS — E03.9 ACQUIRED HYPOTHYROIDISM: ICD-10-CM

## 2018-01-11 DIAGNOSIS — G89.29 CHRONIC BACK PAIN, UNSPECIFIED BACK LOCATION, UNSPECIFIED BACK PAIN LATERALITY: ICD-10-CM

## 2018-01-11 RX ORDER — LEVOTHYROXINE SODIUM 0.03 MG/1
25 TABLET ORAL DAILY
Qty: 30 TABLET | Refills: 2 | Status: SHIPPED | OUTPATIENT
Start: 2018-01-11 | End: 2018-01-11 | Stop reason: SDUPTHER

## 2018-01-11 RX ORDER — LEVOTHYROXINE SODIUM 0.03 MG/1
25 TABLET ORAL DAILY
Qty: 90 TABLET | Refills: 3 | Status: SHIPPED | OUTPATIENT
Start: 2018-01-11 | End: 2022-08-19 | Stop reason: SDUPTHER

## 2018-01-11 RX ORDER — GABAPENTIN 100 MG/1
100 CAPSULE ORAL 3 TIMES DAILY
Qty: 270 CAPSULE | Refills: 0 | OUTPATIENT
Start: 2018-01-11 | End: 2022-07-19

## 2018-01-11 NOTE — TELEPHONE ENCOUNTER
Do you know if the [facility] the same as [facility]? I can not find [facility] under pharmacies.

## 2018-01-11 NOTE — TELEPHONE ENCOUNTER
He may send a 90 day prescription for gabapentin and for her levothyroxin. Since gabapentin is a narcotic they may want you to also call it in.

## 2018-02-09 ENCOUNTER — RESULTS ENCOUNTER (OUTPATIENT)
Dept: FAMILY MEDICINE CLINIC | Facility: CLINIC | Age: 83
End: 2018-02-09

## 2018-02-09 ENCOUNTER — OFFICE VISIT (OUTPATIENT)
Dept: FAMILY MEDICINE CLINIC | Facility: CLINIC | Age: 83
End: 2018-02-09

## 2018-02-09 VITALS
DIASTOLIC BLOOD PRESSURE: 64 MMHG | OXYGEN SATURATION: 95 % | HEIGHT: 63 IN | WEIGHT: 122.8 LBS | TEMPERATURE: 97.6 F | HEART RATE: 74 BPM | BODY MASS INDEX: 21.76 KG/M2 | SYSTOLIC BLOOD PRESSURE: 110 MMHG

## 2018-02-09 DIAGNOSIS — L29.9 ITCHING: Primary | ICD-10-CM

## 2018-02-09 DIAGNOSIS — T78.40XA ALLERGIC STATE, INITIAL ENCOUNTER: ICD-10-CM

## 2018-02-09 DIAGNOSIS — E53.8 VITAMIN B12 DEFICIENCY: ICD-10-CM

## 2018-02-09 PROCEDURE — 99213 OFFICE O/P EST LOW 20 MIN: CPT | Performed by: NURSE PRACTITIONER

## 2018-02-09 RX ORDER — LANOLIN ALCOHOL/MO/W.PET/CERES
1000 CREAM (GRAM) TOPICAL DAILY
COMMUNITY
End: 2022-08-19

## 2018-02-09 RX ORDER — HYDROXYZINE HYDROCHLORIDE 10 MG/1
10 TABLET, FILM COATED ORAL EVERY 8 HOURS PRN
Qty: 90 TABLET | Refills: 0 | Status: SHIPPED | OUTPATIENT
Start: 2018-02-09 | End: 2018-03-09 | Stop reason: SDUPTHER

## 2018-02-09 RX ORDER — MONTELUKAST SODIUM 10 MG/1
10 TABLET ORAL NIGHTLY
Qty: 90 TABLET | Refills: 3 | Status: SHIPPED | OUTPATIENT
Start: 2018-02-09 | End: 2022-08-17

## 2018-02-09 RX ORDER — PHENOL 1.4 %
600 AEROSOL, SPRAY (ML) MUCOUS MEMBRANE DAILY
COMMUNITY

## 2018-02-09 NOTE — PROGRESS NOTES
Subjective   Keyana Nguyen is a 89 y.o. female.  Here to follow up on gabapentin.  Has been itching lately and concerned it is the cause.    History of Present Illness 89-year-old  female presents for a routine follow-up on use of gabapentin for chronic pain. Patient has began to itch lately and is concerned that it might be the gabapentin. Patient has not changed detergents fabric softeners fabrics has no new pets has not changed her diet or perfumes. Cannot figure out why she is itching. Moisturize routinely.    The following portions of the patient's history were reviewed and updated as appropriate: allergies, current medications, past family history, past medical history, past social history, past surgical history and problem list.    Review of Systems   Cardiovascular:        Mitral valve insufficiency, essential hypertension, atrial flutter, aortic valve insufficiency   Gastrointestinal:        Vitamin D deficiency irritable bowel syndrome vitamin B12 deficiency   Endocrine:        Hypothyroidism   Musculoskeletal:        Chronic back and hip pain, osteopenia generalized arthritis   All other systems reviewed and are negative.      Objective   Physical Exam   Constitutional: She is oriented to person, place, and time. She appears well-developed and well-nourished.   HENT:   Head: Normocephalic and atraumatic.   Eyes: EOM are normal. Pupils are equal, round, and reactive to light.   Glasses   Neck: Normal range of motion. Neck supple.   Cardiovascular: Normal rate and regular rhythm.    Pulmonary/Chest: Effort normal and breath sounds normal.   Abdominal: Soft. Bowel sounds are normal.   Musculoskeletal:   Arthritis   Neurological: She is alert and oriented to person, place, and time.   Skin: Skin is warm and dry.   General itching   Psychiatric: She has a normal mood and affect. Her behavior is normal. Judgment and thought content normal.   Nursing note and vitals reviewed.      Assessment/Plan    Diagnoses and all orders for this visit:    Itching  -     hydrOXYzine (ATARAX) 10 MG tablet; Take 1 tablet by mouth Every 8 (Eight) Hours As Needed for Itching.  -     montelukast (SINGULAIR) 10 MG tablet; Take 1 tablet by mouth Every Night.    Allergic state, initial encounter  -     montelukast (SINGULAIR) 10 MG tablet; Take 1 tablet by mouth Every Night.      After discussion with patient has decided to stop the gabapentin for a couple weeks and see if this is truly what is causing the itching. Patient usually takes 2 gabapentin a day one in the morning 1 in the evening. Next week she will only take one gabapentin at bedtime, the following week she will not take any. If the itching stops we will know that it was the gabapentin and we will have to change to some other form of pain management. If the itching continues then we will try low-dose hydroxyzine to address the itch. Will also do a trial period of Singulair to see if this will help with her nasal runniness in the morning and her generalized itch. Patient will let me know within 2-3 weeks how the medications are working. Pt wishes to do labs with next visit.

## 2018-03-01 ENCOUNTER — TELEPHONE (OUTPATIENT)
Dept: FAMILY MEDICINE CLINIC | Facility: CLINIC | Age: 83
End: 2018-03-01

## 2018-03-01 DIAGNOSIS — L29.9 ITCHING: ICD-10-CM

## 2018-03-01 DIAGNOSIS — L29.9 ITCHING: Primary | ICD-10-CM

## 2018-03-01 RX ORDER — FEXOFENADINE HCL 180 MG/1
180 TABLET ORAL DAILY
Qty: 28 TABLET | Refills: 6 | Status: SHIPPED | OUTPATIENT
Start: 2018-03-01 | End: 2018-03-01 | Stop reason: SDUPTHER

## 2018-03-01 RX ORDER — FEXOFENADINE HCL 180 MG/1
180 TABLET ORAL DAILY
Qty: 28 TABLET | Refills: 6 | Status: SHIPPED | OUTPATIENT
Start: 2018-03-01 | End: 2018-05-10 | Stop reason: HOSPADM

## 2018-03-01 NOTE — TELEPHONE ENCOUNTER
Yes please resume the gabapentin. That will help her pain throughout the day as well as when she sleeps at night. Hydroxyzine is the #1 product for itch control if it does not help the only other medication that I can think of would be Allegra at 160 mg per day. I do not know if her insurance will cover it but I will be happy to order it. I will place that order now thank you

## 2018-03-01 NOTE — TELEPHONE ENCOUNTER
PATIENT CALLED TO LET ERNST KNOW SHE HAS STOPPED HER GABAPENTIN FOR OVER A WEEK AND THE ITCH HAS NOT STOPPED.  DO YOU WANT HER TO GO BACK ON HER GABAPENTIN? PATIENT PAIN LEVEL IN THE MORNINGS IS VERY BAD 8 OR 9.   LATER IN THE DAY IT WILL GO DO TO A 3 OR 4.     HYDROXYZINE HAS NOT HELPED WITH ITCHING EITHER.     PATIENT WOULD LIKE SOMETHING ELSE FOR THE ITCHING.       PLEASE CALL [phone]    THANKS!

## 2018-03-01 NOTE — TELEPHONE ENCOUNTER
Pt informed. Rx was changed to [facility] in [place]. Your [facility] was taken off of her list.

## 2018-03-06 ENCOUNTER — TELEPHONE (OUTPATIENT)
Dept: FAMILY MEDICINE CLINIC | Facility: CLINIC | Age: 83
End: 2018-03-06

## 2018-03-06 NOTE — TELEPHONE ENCOUNTER
Patient called states the medication for itching isn't helping (Hyodroxyzine) that she is still itching

## 2018-03-09 DIAGNOSIS — L29.9 ITCHING: ICD-10-CM

## 2018-03-09 RX ORDER — HYDROXYZINE HYDROCHLORIDE 10 MG/1
TABLET, FILM COATED ORAL
Qty: 90 TABLET | Refills: 0 | Status: SHIPPED | OUTPATIENT
Start: 2018-03-09 | End: 2018-05-10 | Stop reason: HOSPADM

## 2018-04-02 ENCOUNTER — TELEPHONE (OUTPATIENT)
Dept: CARDIOLOGY | Facility: CLINIC | Age: 83
End: 2018-04-02

## 2018-04-02 NOTE — TELEPHONE ENCOUNTER
Pt has an appt on 5/16 and she is wanting to know if you were planning on doing tests on her, she would like to have them done on the same day. JR

## 2018-04-25 ENCOUNTER — TRANSCRIBE ORDERS (OUTPATIENT)
Dept: ADMINISTRATIVE | Facility: HOSPITAL | Age: 83
End: 2018-04-25

## 2018-04-25 DIAGNOSIS — Z12.39 SCREENING BREAST EXAMINATION: Primary | ICD-10-CM

## 2018-05-10 ENCOUNTER — OFFICE VISIT (OUTPATIENT)
Dept: CARDIOLOGY | Facility: CLINIC | Age: 83
End: 2018-05-10

## 2018-05-10 ENCOUNTER — HOSPITAL ENCOUNTER (OUTPATIENT)
Dept: MAMMOGRAPHY | Facility: HOSPITAL | Age: 83
Discharge: HOME | End: 2018-05-10
Admitting: NURSE PRACTITIONER

## 2018-05-10 VITALS
DIASTOLIC BLOOD PRESSURE: 82 MMHG | SYSTOLIC BLOOD PRESSURE: 120 MMHG | HEART RATE: 87 BPM | HEIGHT: 63 IN | WEIGHT: 121.8 LBS | BODY MASS INDEX: 21.58 KG/M2

## 2018-05-10 DIAGNOSIS — I48.20 CHRONIC ATRIAL FIBRILLATION (HCC): Primary | ICD-10-CM

## 2018-05-10 DIAGNOSIS — I10 ESSENTIAL HYPERTENSION: ICD-10-CM

## 2018-05-10 DIAGNOSIS — I34.0 NON-RHEUMATIC MITRAL REGURGITATION: ICD-10-CM

## 2018-05-10 DIAGNOSIS — I35.1 NONRHEUMATIC AORTIC VALVE INSUFFICIENCY: ICD-10-CM

## 2018-05-10 DIAGNOSIS — Z12.39 SCREENING BREAST EXAMINATION: ICD-10-CM

## 2018-05-10 PROBLEM — I48.92 ATRIAL FLUTTER (HCC): Status: RESOLVED | Noted: 2017-05-11 | Resolved: 2018-05-10

## 2018-05-10 PROCEDURE — 77063 BREAST TOMOSYNTHESIS BI: CPT

## 2018-05-10 PROCEDURE — 93000 ELECTROCARDIOGRAM COMPLETE: CPT | Performed by: INTERNAL MEDICINE

## 2018-05-10 PROCEDURE — 77067 SCR MAMMO BI INCL CAD: CPT

## 2018-05-10 PROCEDURE — 99214 OFFICE O/P EST MOD 30 MIN: CPT | Performed by: INTERNAL MEDICINE

## 2018-05-10 RX ORDER — LISINOPRIL 20 MG/1
20 TABLET ORAL DAILY
Qty: 90 TABLET | Refills: 3 | Status: SHIPPED | OUTPATIENT
Start: 2018-05-10 | End: 2019-05-10 | Stop reason: SDUPTHER

## 2018-05-10 NOTE — PROGRESS NOTES
Date of Office Visit: 05/10/2018  Encounter Provider: Kylah Jones MD  Place of Service: [facility] CARDIOLOGY  Patient Name: Keyana Nguyen  :[id number]      Patient ID:  Keyana Nguyen is a 89 y.o. female is here for  followup for         History of Present Illness    She moved to [place] for several years and moved  back. Her daughter is in the room with her here today. When she was in [place], she was  following with a cardiologist there, Dr. Buenrostro, who was with [facility].   She had had imaging studies done there. She had an echocardiogram done on 2015,  showing moderate to severe aortic insufficiency, moderate to severe mitral insufficiency,  aortic valvular sclerosis without stenosis, concentric left ventricular hypertrophy with  ejection fraction of 63%, and elevated right ventricular systolic pressure of 48 mmHg.   She had had a prior echocardiogram done on 10/01/2014, which was more normal in appearance  so she definitely had progression of her valvular heart disease. In addition, she had a  carotid duplex study done on 2014, showing less than 40% right internal carotid  artery stenosis and 20%-40% left internal carotid artery stenosis. She has a known  history of premature atrial complexes and a history of vertigo as well.     When she came in, she was in atrial fibrillation, which was new  Her [id number] score, she has a 3.2% annual risk of stroke.      She does have a history of chronic venous insufficiency as well, which she has treated in  the past with compression stockings but has not been wearing them recently.   She has mild dizziness occasionally but it has gotten better with vitamin B12 shots.   She has a strong family history of stroke.      She had a Holter recording done 2015 which showed underlying atrial  fibrillation with average heart rate of 83 beats per minute and no significant pauses.     She had an echo  done 3/2016 showing ejection fraction 62%, moderate left atrial enlargement, mild-to-moderate mitral insufficiency, mild aortic insufficiency, mild-to-moderate tricuspid insufficiency.        she is having a lot of itching.  She's had no chest pain, dizziness or syncope.  She doesn't feel her heart racing or skipping.  She is taking her medications as directed.  She overall feels good.    Past Medical History:   Diagnosis Date   • Aortic regurgitation    • Atrial fibrillation    • Fatigue    • Hypothyroidism    • Irritable bowel syndrome    • Leaky heart valve    • Mitral regurgitation    • Mitral valve insufficiency    • Nonrheumatic aortic (valve) insufficiency    • Osteopenia of the elderly    • Postmenopausal status    • Vitamin B12 deficiency    • Vitamin D deficiency          Past Surgical History:   Procedure Laterality Date   • BREAST SURGERY     • ORTHOPEDIC SURGERY     • TONSILLECTOMY AND ADENOIDECTOMY     • TOTAL KNEE ARTHROPLASTY         Current Outpatient Prescriptions on File Prior to Visit   Medication Sig Dispense Refill   • acidophilus (FLORANEX) tablet tablet TAKE ONE TABLET BY MOUTH BID  0   • apixaban (ELIQUIS) 2.5 MG tablet tablet Take 1 tablet by mouth Every 12 (Twelve) Hours. 180 tablet 1   • Biotin 5000 MCG capsule Take 1 capsule by mouth daily.     • calcium carbonate (OS-KAMAR) 600 MG tablet Take 600 mg by mouth Daily.     • Cholecalciferol (VITAMIN D) 1000 UNITS tablet Take 1 tablet by mouth daily.     • gabapentin (NEURONTIN) 100 MG capsule Take 1 capsule by mouth 3 (Three) Times a Day. 270 capsule 0   • levothyroxine (SYNTHROID, LEVOTHROID) 25 MCG tablet Take 1 tablet by mouth Daily. 90 tablet 3   • lisinopril (PRINIVIL,ZESTRIL) 20 MG tablet Take 1 tablet by mouth Daily. 90 tablet 1   • montelukast (SINGULAIR) 10 MG tablet Take 1 tablet by mouth Every Night. 90 tablet 3   • Multiple Vitamins-Minerals (CENTRUM SILVER) tablet Take 1 tablet by mouth daily.     • vitamin B-12 (CYANOCOBALAMIN)  1000 MCG tablet Take 1,000 mcg by mouth Daily.     • vitamin C (ASCORBIC ACID) 500 MG tablet Take 1 tablet by mouth daily.     • [DISCONTINUED] fexofenadine (ALLEGRA) 180 MG tablet Take 1 tablet by mouth Daily. 28 tablet 6   • [DISCONTINUED] guaiFENesin (MUCINEX) 600 MG 12 hr tablet Take 1,200 mg by mouth 2 (Two) Times a Day.     • [DISCONTINUED] hydrOXYzine (ATARAX) 10 MG tablet TAKE 1 TABLET BY MOUTH EVERY 8 HOURS AS NEEDED FOR ITCHING 90 tablet 0     No current facility-administered medications on file prior to visit.        Social History     Social History   • Marital status:      Spouse name: N/A   • Number of children: N/A   • Years of education: N/A     Occupational History   • Not on file.     Social History Main Topics   • Smoking status: Never Smoker   • Smokeless tobacco: Never Used      Comment: caffeine use   • Alcohol use No   • Drug use: No   • Sexual activity: No     Other Topics Concern   • Not on file     Social History Narrative   • No narrative on file           Review of Systems   Constitution: Negative.   HENT: Negative for congestion.    Eyes: Negative for vision loss in left eye and vision loss in right eye.   Respiratory: Negative.  Negative for cough, hemoptysis, shortness of breath, sleep disturbances due to breathing, snoring, sputum production and wheezing.    Endocrine: Negative.    Hematologic/Lymphatic: Negative.    Skin: Negative for poor wound healing and rash.   Musculoskeletal: Negative for falls, gout, muscle cramps and myalgias.   Gastrointestinal: Negative for abdominal pain, diarrhea, dysphagia, hematemesis, melena, nausea and vomiting.   Neurological: Negative for excessive daytime sleepiness, dizziness, headaches, light-headedness, loss of balance, seizures and vertigo.   Psychiatric/Behavioral: Negative for depression and substance abuse. The patient is not nervous/anxious.        Procedures    ECG 12 Lead  Date/Time: 5/10/2018 12:55 PM  Performed by: TIARA  "JONATHAN BRICE  Authorized by: JONATHAN MENJIVAR   Comparison: compared with previous ECG   Similar to previous ECG  Rhythm: atrial fibrillation  Clinical impression: abnormal ECG                Objective:      Vitals:    05/10/18 1247   BP: 120/82   BP Location: Left arm   Patient Position: Sitting   Pulse: 87   Weight: 55.2 kg (121 lb 12.8 oz)   Height: 160 cm (63\")     Body mass index is 21.58 kg/m².    Physical Exam   Constitutional: She is oriented to person, place, and time. She appears well-developed and well-nourished. No distress.   HENT:   Head: Normocephalic and atraumatic.   Eyes: Conjunctivae are normal. No scleral icterus.   Neck: Neck supple. No JVD present. Carotid bruit is not present. No thyromegaly present.   Cardiovascular: Normal rate, S1 normal, S2 normal, normal heart sounds and intact distal pulses.  An irregularly irregular rhythm present.  No extrasystoles are present. PMI is not displaced.  Exam reveals no gallop.    No murmur heard.  Pulses:       Carotid pulses are 2+ on the right side, and 2+ on the left side.       Radial pulses are 2+ on the right side, and 2+ on the left side.        Dorsalis pedis pulses are 2+ on the right side, and 2+ on the left side.        Posterior tibial pulses are 2+ on the right side, and 2+ on the left side.   Pulmonary/Chest: Effort normal and breath sounds normal. No respiratory distress. She has no wheezes. She has no rhonchi. She has no rales. She exhibits no tenderness.   Abdominal: Soft. Bowel sounds are normal. She exhibits no distension, no abdominal bruit and no mass. There is no tenderness.   Musculoskeletal: She exhibits no edema or deformity.   Lymphadenopathy:     She has no cervical adenopathy.   Neurological: She is alert and oriented to person, place, and time. No cranial nerve deficit.   Skin: Skin is warm and dry. No rash noted. She is not diaphoretic. No cyanosis. No pallor. Nails show no clubbing.   Psychiatric: She has a normal mood and " affect. Judgment normal.   Vitals reviewed.      Lab Review:       Assessment:      Diagnosis Plan   1. Chronic atrial fibrillation     2. Essential hypertension     3. Non-rheumatic mitral regurgitation     4. Nonrheumatic aortic valve insufficiency       1.  Atrial fibrillation.  On Eliquis 2.5 mg twice daily because she has a  [id number] score, which gives her a high annual risk of stroke at 3.2%.    2.  mild to moderate mitral insufficiency.    3.  Mild aortic insufficiency.   4.  Mild to moderate tricuspid insufficiency.   5.  Mild carotid artery stenosis.  Will repeat a carotid duplex in six months.    6.  Urinary incontinence.    7.  History of right breast cancer.    8.  Chronic venous insufficiency.  She is elevating her legs and wearing compression  stockings.    9.  Weight loss.  She is gaining weight.    10.  Strong family history of stroke.       Plan:       See chaka in 1 year, no changes.     Atrial Fibrillation and Atrial Flutter  Assessment  • The patient has permanent atrial fibrillation  • This is non-valvular in etiology  • The patient's CHADS2-VASc score is 3  • A [id number] score of 2 or more is considered a high risk for a thromboembolic event  • Apixaban prescribed    Plan  • Continue in atrial fibrillation with rate control  • Continue apixaban for antithrombotic therapy, bleeding issues discussed

## 2019-04-25 ENCOUNTER — TRANSCRIBE ORDERS (OUTPATIENT)
Dept: ADMINISTRATIVE | Facility: HOSPITAL | Age: 84
End: 2019-04-25

## 2019-04-25 DIAGNOSIS — Z12.39 BREAST SCREENING: Primary | ICD-10-CM

## 2019-05-09 ENCOUNTER — OFFICE VISIT (OUTPATIENT)
Dept: CARDIOLOGY | Facility: CLINIC | Age: 84
End: 2019-05-09

## 2019-05-09 VITALS
BODY MASS INDEX: 20.23 KG/M2 | DIASTOLIC BLOOD PRESSURE: 70 MMHG | WEIGHT: 114.2 LBS | SYSTOLIC BLOOD PRESSURE: 122 MMHG | HEIGHT: 63 IN | HEART RATE: 74 BPM

## 2019-05-09 DIAGNOSIS — R06.09 DYSPNEA ON EXERTION: ICD-10-CM

## 2019-05-09 DIAGNOSIS — R20.0 NUMBNESS AND TINGLING: ICD-10-CM

## 2019-05-09 DIAGNOSIS — I34.0 NON-RHEUMATIC MITRAL REGURGITATION: ICD-10-CM

## 2019-05-09 DIAGNOSIS — E03.9 ACQUIRED HYPOTHYROIDISM: ICD-10-CM

## 2019-05-09 DIAGNOSIS — R20.2 NUMBNESS AND TINGLING: ICD-10-CM

## 2019-05-09 DIAGNOSIS — I10 ESSENTIAL HYPERTENSION: ICD-10-CM

## 2019-05-09 DIAGNOSIS — I35.1 NONRHEUMATIC AORTIC VALVE INSUFFICIENCY: ICD-10-CM

## 2019-05-09 DIAGNOSIS — I48.20 CHRONIC ATRIAL FIBRILLATION (HCC): Primary | ICD-10-CM

## 2019-05-09 PROBLEM — T78.40XA ALLERGIC STATE: Status: RESOLVED | Noted: 2018-02-09 | Resolved: 2019-05-09

## 2019-05-09 PROCEDURE — 93000 ELECTROCARDIOGRAM COMPLETE: CPT | Performed by: NURSE PRACTITIONER

## 2019-05-09 PROCEDURE — 99214 OFFICE O/P EST MOD 30 MIN: CPT | Performed by: NURSE PRACTITIONER

## 2019-05-09 NOTE — PROGRESS NOTES
"  Date of Office Visit: 2019  Encounter Provider: DUSTIN Anna  Place of Service: [facility] CARDIOLOGY  Patient Name: Keyana Nguyen  :1925      Chief Complaint   Patient presents with   • Follow-up     Annual Visit    :     Dear Dr. Martínez,     HPI: Keyana Nguyen is a pleasant 89 y.o. female who presents today for cardiac follow up of atrial fibrillation and valvular insufficiency. She is a new patient to me and her previous records have been reviewed. She has been diagnosed with breast cancer and vertigo in the past, and is currently treated for APCs, hypertension, hypothyroidism, and irritable bowel syndrome.  She also has a history of carotid artery stenosis diagnosed in .  She has chronic venous insufficiency and wears compression stockings.    In 2014 she had an echocardiogram which showed normal functioning valvular status.  She had a follow-up echocardiogram in 2015 in [place] which showed normal EF of 63%, moderate to severe aortic insufficiency, aortic valve sclerosis without stenosis, moderate to severe mitral insufficiency, and elevated RVSP of 48 mmHg.  She has been diagnosed with persistent atrial fibrillation. A follow-up echocardiogram in 2016 showed improvement of the valvular status to mild to moderate mitral insufficiency, mild aortic insufficiency, mild to moderate tricuspid insufficiency.    She is an established patient of Dr. Kylah Jones and was last seen in our office in May 2018.  There were no changes made to her plan of care and she was recommended to follow-up with me in 1 year.    She presents today for annual follow-up visit and is accompanied by her daughter.  She has noticed \"spots\" on her arms and was wondering if it could be related to her blood thinner.  She reports a little bit more dyspnea with exertion when climbing steps and walking quickly.  This resolves quickly with rest. She walks " outside or on her treadmill 2 miles most days of the week.  She has a history of venous insufficiency and wears compression stockings.  She denies any lower extremity edema recently.  When she wakes up in the morning she is noticed some numbness/tingling of her toes.  She has seen a dermatologist for generalized itching.  She denies chest pain, PND, orthopnea, cough, dizziness, syncope, or bleeding.  Her blood pressure and heart rate are both normal today.    ADDENDUM 5/10/2019: I requested her blood work from her PCP office dated 8/15/2018.  Comprehensive metabolic panel showed normal except for low blood sugar of 60, BUN and creatinine normal.  CBC and TSH normal.    Past Medical History:   Diagnosis Date   • Aortic regurgitation    • Atrial fibrillation (CMS/HCC)    • Breast cancer (CMS/HCC)    • Essential hypertension    • Fatigue    • Herpes zoster with complication 10/3/2016   • Hypothyroidism    • Irritable bowel syndrome    • Mitral valve insufficiency    • Nonrheumatic aortic (valve) insufficiency    • Osteopenia of the elderly    • Postmenopausal status    • Vitamin B12 deficiency    • Vitamin D deficiency        Past Surgical History:   Procedure Laterality Date   • BREAST BIOPSY     • BREAST SURGERY     • HYSTERECTOMY     • MASTECTOMY     • OOPHORECTOMY     • ORTHOPEDIC SURGERY     • TONSILLECTOMY AND ADENOIDECTOMY     • TOTAL KNEE ARTHROPLASTY         Social History     Socioeconomic History   • Marital status:      Spouse name: Not on file   • Number of children: Not on file   • Years of education: Not on file   • Highest education level: Not on file   Tobacco Use   • Smoking status: Never Smoker   • Smokeless tobacco: Never Used   Substance and Sexual Activity   • Alcohol use: No     Comment: caffeine use   • Drug use: No   • Sexual activity: No       Family History   Problem Relation Age of Onset   • Heart failure Mother         congestive   • Arthritis Mother    • Hypertension Mother    •  Stroke Father    • Diabetes Father    • Hypertension Father    • Arthritis Father    • Diabetes Brother    • Cancer Brother    • Liver disease Brother    • Cancer Sister    • Cancer Brother        The following portion of the patient's history were reviewed and updated as appropriate: past medical history, past surgical history, past social history, past family history, allergies, current medications, and problem list.    Review of Systems   Constitution: Negative for chills, diaphoresis, fever, malaise/fatigue, night sweats, weight gain and weight loss.   HENT: Positive for hearing loss. Negative for nosebleeds, sore throat and tinnitus.    Eyes: Positive for blurred vision and pain. Negative for double vision and visual disturbance.   Cardiovascular: Positive for dyspnea on exertion. Negative for chest pain, claudication, cyanosis, irregular heartbeat, leg swelling, near-syncope, orthopnea, palpitations, paroxysmal nocturnal dyspnea and syncope.   Respiratory: Negative for cough, hemoptysis, snoring and wheezing.    Endocrine: Negative for cold intolerance, heat intolerance and polyuria.   Hematologic/Lymphatic: Negative for bleeding problem. Does not bruise/bleed easily.   Skin: Positive for color change and itching. Negative for dry skin and flushing.   Musculoskeletal: Negative for falls, joint pain, joint swelling, muscle cramps, muscle weakness and myalgias.   Gastrointestinal: Negative for abdominal pain, constipation, heartburn, melena, nausea and vomiting.   Genitourinary: Negative for dysuria and hematuria.   Neurological: Positive for numbness and paresthesias. Negative for excessive daytime sleepiness, dizziness, light-headedness, loss of balance, seizures and vertigo.   Psychiatric/Behavioral: Negative for altered mental status, depression, memory loss and substance abuse. The patient is nervous/anxious. The patient does not have insomnia.    Allergic/Immunologic: Negative for environmental allergies.  "      No Known Allergies      Current Outpatient Medications:   •  Acetaminophen (TYLENOL EXTRA STRENGTH PO), Take 1 tablet by mouth Daily., Disp: , Rfl:   •  apixaban (ELIQUIS) 2.5 MG tablet tablet, Take 1 tablet by mouth Every 12 (Twelve) Hours., Disp: 180 tablet, Rfl: 3  •  bimatoprost (LUMIGAN) 0.01 % ophthalmic drops, Administer 1 drop to both eyes Every Night., Disp: , Rfl:   •  calcium carbonate (OS-KAMAR) 600 MG tablet, Take 600 mg by mouth Daily., Disp: , Rfl:   •  Cetirizine HCl (ZYRTEC ALLERGY PO), Take 1 tablet by mouth Daily., Disp: , Rfl:   •  Cholecalciferol (VITAMIN D) 1000 UNITS tablet, Take 1 tablet by mouth daily., Disp: , Rfl:   •  levothyroxine (SYNTHROID, LEVOTHROID) 25 MCG tablet, Take 1 tablet by mouth Daily., Disp: 90 tablet, Rfl: 3  •  lisinopril (PRINIVIL,ZESTRIL) 20 MG tablet, Take 1 tablet by mouth Daily., Disp: 90 tablet, Rfl: 3  •  montelukast (SINGULAIR) 10 MG tablet, Take 1 tablet by mouth Every Night., Disp: 90 tablet, Rfl: 3  •  Multiple Vitamins-Minerals (CENTRUM SILVER) tablet, Take 1 tablet by mouth daily., Disp: , Rfl:   •  vitamin B-12 (CYANOCOBALAMIN) 1000 MCG tablet, Take 1,000 mcg by mouth Daily., Disp: , Rfl:   •  vitamin C (ASCORBIC ACID) 500 MG tablet, Take 1 tablet by mouth daily., Disp: , Rfl:   •  acidophilus (FLORANEX) tablet tablet, TAKE ONE TABLET BY MOUTH BID, Disp: , Rfl: 0  •  Biotin 5000 MCG capsule, Take 1 capsule by mouth daily., Disp: , Rfl:   •  gabapentin (NEURONTIN) 100 MG capsule, Take 1 capsule by mouth 3 (Three) Times a Day., Disp: 270 capsule, Rfl: 0        Objective:     Vitals:    05/09/19 1235   BP: 122/70   BP Location: Left arm   Pulse: 74   Weight: 51.8 kg (114 lb 3.2 oz)   Height: 160 cm (63\")     Body mass index is 20.23 kg/m².    PHYSICAL EXAM:    Vitals Reviewed.   General Appearance: No acute distress, well developed and well nourished. Thin.   Eyes: Conjunctiva and lids: No erythema, swelling, or discharge. Sclera non-icteric.  Wears " glasses.  HENT: Atraumatic, normocephalic. External eyes, ears, and nose normal. No hearing loss noted. Mucous membranes normal. Lips not cyanotic. Neck supple with no tenderness.  Respiratory: No signs of respiratory distress. Respiration rhythm and depth normal.   Clear to auscultation. No rales, crackles, rhonchi, or wheezing auscultated.   Cardiovascular:  Jugular Venous Pressure: Normal  Heart Rate and Rhythm: Irregularly, irregular.  Heart Sounds: Normal S1 and S2. No S3 or S4 noted.  Murmurs: No murmurs noted. No rubs, thrills, or gallops.   Arterial Pulses: Carotid pulses normal. No carotid bruit noted. Posterior tibialis and dorsalis pedis pulses normal.   Lower Extremities: No edema noted.  Gastrointestinal:  Abdomen soft, non-distended, non-tender. Normal bowel sounds. No hepatomegaly.   Musculoskeletal: Normal movement of extremities  Skin and Nails: General appearance normal. No pallor, cyanosis, diaphoresis. Skin temperature normal. No clubbing of fingernails.   Psychiatric: Patient alert and oriented to person, place, and time. Speech and behavior appropriate. Normal mood and affect.       ECG 12 Lead  Date/Time: 5/9/2019 12:39 PM  Performed by: Claudine Mitchell APRN  Authorized by: Claudine Mitchell APRN   Comparison: compared with previous ECG from 5/10/2018  Similar to previous ECG  Rhythm: atrial fibrillation  Rate: normal  BPM: 74  Q waves: V1, V2 and V3    ST Segments: ST segments normal  QRS axis: left    Clinical impression: abnormal EKG              Assessment:       Diagnosis Plan   1. Chronic atrial fibrillation (CMS/HCC)     2. Dyspnea on exertion     3. Non-rheumatic mitral regurgitation     4. Nonrheumatic aortic valve insufficiency     5. Essential hypertension     6. Numbness and tingling            Plan:       1.  Chronic Atrial Fibrillation: She remains in atrial fibrillation with controlled ventricular response.  She is not on any rate lowering medications and remains on apixaban  for stroke prevention.  I will request her last CBC and BMP/CMP from her PCP office.    Atrial Fibrillation and Atrial Flutter  Assessment  • The patient has permanent atrial fibrillation  • The patient's CHADS2-VASc score is 4  • A [id number] score of 2 or more is considered a high risk for a thromboembolic event  • Apixaban prescribed    Plan  • Continue in atrial fibrillation with rate control  • Continue apixaban for antithrombotic therapy, bleeding issues discussed    2.  Dyspnea on Exertion: She is noticed a little bit more dyspnea upon exertion such as climbing the steps or during her walks.  She is due for repeat echocardiogram as it is been 3 years since her last one.  After discussion she does not feel like it has really changed all that much and has declined the echocardiogram today.    3/4.  Mitral and Aortic Insufficiency: Noted on previous echocardiograms.  She has declined a repeat echocardiogram for reassessment.    5.  Hypertension: Blood pressure is well controlled today.    6.  Numbness and Tingling of Toes: She experiences this every morning when she wakes up and then it resolves.  I checked her circulation in her lower extremity pulses are all intact.  I recommend that she discuss with Dr. Martínez.    7.  Spots on arms: This may just be age spots.  She sees dermatology so have asked her to follow-up with them her PCP.    8.  I recommended follow-up with Dr. Jones in 1 year, unless otherwise needed sooner.  If she notices worsening dyspnea she needs to contact our office and she verbalized understanding.    As always, it has been a pleasure to participate in your patient's care. Thank you.       Sincerely,         DUSTIN Gordon        **Dragon Disclaimer:**  Much of this encounter note is an electronic transcription/translation of spoken language to printed text. The electronic translation of spoken language may permit erroneous, or at times, nonsensical words or phrases to be  inadvertently transcribed. Although I have reviewed the note for such errors, some may still exist.

## 2019-05-10 RX ORDER — LISINOPRIL 20 MG/1
20 TABLET ORAL DAILY
Qty: 90 TABLET | Refills: 3 | Status: SHIPPED | OUTPATIENT
Start: 2019-05-10 | End: 2020-01-08 | Stop reason: SDUPTHER

## 2019-12-09 ENCOUNTER — APPOINTMENT (OUTPATIENT)
Dept: WOMENS IMAGING | Facility: HOSPITAL | Age: 84
End: 2019-12-09

## 2019-12-09 PROCEDURE — 77067 SCR MAMMO BI INCL CAD: CPT | Performed by: RADIOLOGY

## 2020-01-08 DIAGNOSIS — I10 ESSENTIAL HYPERTENSION: ICD-10-CM

## 2020-01-08 RX ORDER — LISINOPRIL 20 MG/1
20 TABLET ORAL DAILY
Qty: 90 TABLET | Refills: 1 | Status: SHIPPED | OUTPATIENT
Start: 2020-01-08 | End: 2020-08-17 | Stop reason: SDUPTHER

## 2020-08-17 DIAGNOSIS — I10 ESSENTIAL HYPERTENSION: ICD-10-CM

## 2020-08-17 RX ORDER — LISINOPRIL 20 MG/1
20 TABLET ORAL DAILY
Qty: 90 TABLET | Refills: 1 | Status: SHIPPED | OUTPATIENT
Start: 2020-08-17 | End: 2021-03-08 | Stop reason: SDUPTHER

## 2020-10-26 ENCOUNTER — TELEPHONE (OUTPATIENT)
Dept: CARDIOLOGY | Facility: CLINIC | Age: 85
End: 2020-10-26

## 2020-10-26 NOTE — TELEPHONE ENCOUNTER
Patient calling stating she is concerned about the increase cases of Covid-19 and she lives in an assisted living and would like to reschedule her appointment until February 2021. I did advise her we could do a telehealth but declined.    Can you call to reschedule please?  Thank you,   Arlene

## 2021-03-08 DIAGNOSIS — I10 ESSENTIAL HYPERTENSION: ICD-10-CM

## 2021-03-09 DIAGNOSIS — I48.91 ATRIAL FIBRILLATION, UNSPECIFIED TYPE (HCC): Primary | ICD-10-CM

## 2021-03-09 RX ORDER — LISINOPRIL 20 MG/1
20 TABLET ORAL DAILY
Qty: 90 TABLET | Refills: 0 | Status: SHIPPED | OUTPATIENT
Start: 2021-03-09 | End: 2021-06-03

## 2021-03-09 NOTE — TELEPHONE ENCOUNTER
Her last office visit was 2019 due to the pandemic. She will need blood work, but refills have been sent.

## 2021-05-13 ENCOUNTER — HOSPITAL ENCOUNTER (OUTPATIENT)
Dept: CARDIOLOGY | Facility: HOSPITAL | Age: 86
Discharge: HOME | End: 2021-05-13
Admitting: INTERNAL MEDICINE

## 2021-05-13 ENCOUNTER — TELEPHONE (OUTPATIENT)
Dept: CARDIOLOGY | Facility: CLINIC | Age: 86
End: 2021-05-13

## 2021-05-13 ENCOUNTER — OFFICE VISIT (OUTPATIENT)
Dept: CARDIOLOGY | Facility: CLINIC | Age: 86
End: 2021-05-13

## 2021-05-13 VITALS
BODY MASS INDEX: 19.45 KG/M2 | WEIGHT: 109.8 LBS | HEIGHT: 63 IN | HEART RATE: 82 BPM | DIASTOLIC BLOOD PRESSURE: 70 MMHG | SYSTOLIC BLOOD PRESSURE: 150 MMHG

## 2021-05-13 DIAGNOSIS — I35.1 NONRHEUMATIC AORTIC VALVE INSUFFICIENCY: ICD-10-CM

## 2021-05-13 DIAGNOSIS — R06.09 DYSPNEA ON EXERTION: ICD-10-CM

## 2021-05-13 DIAGNOSIS — I34.0 NONRHEUMATIC MITRAL VALVE REGURGITATION: ICD-10-CM

## 2021-05-13 DIAGNOSIS — I48.20 CHRONIC ATRIAL FIBRILLATION (HCC): ICD-10-CM

## 2021-05-13 DIAGNOSIS — I48.20 CHRONIC ATRIAL FIBRILLATION (HCC): Primary | ICD-10-CM

## 2021-05-13 DIAGNOSIS — I10 ESSENTIAL HYPERTENSION: ICD-10-CM

## 2021-05-13 LAB
ALBUMIN SERPL-MCNC: 3.9 G/DL (ref 3.5–5.2)
ALBUMIN/GLOB SERPL: 2 G/DL
ALP SERPL-CCNC: 73 U/L (ref 39–117)
ALT SERPL W P-5'-P-CCNC: 16 U/L (ref 1–33)
ANION GAP SERPL CALCULATED.3IONS-SCNC: 8.6 MMOL/L (ref 5–15)
AST SERPL-CCNC: 17 U/L (ref 1–32)
BASOPHILS # BLD AUTO: 0.03 10*3/MM3 (ref 0–0.2)
BASOPHILS NFR BLD AUTO: 0.4 % (ref 0–1.5)
BILIRUB SERPL-MCNC: 0.5 MG/DL (ref 0–1.2)
BUN SERPL-MCNC: 10 MG/DL (ref 8–23)
BUN/CREAT SERPL: 13.3 (ref 7–25)
CALCIUM SPEC-SCNC: 8.6 MG/DL (ref 8.2–9.6)
CHLORIDE SERPL-SCNC: 107 MMOL/L (ref 98–107)
CHOLEST SERPL-MCNC: 124 MG/DL (ref 0–200)
CO2 SERPL-SCNC: 26.4 MMOL/L (ref 22–29)
CREAT SERPL-MCNC: 0.75 MG/DL (ref 0.57–1)
DEPRECATED RDW RBC AUTO: 42.8 FL (ref 37–54)
EOSINOPHIL # BLD AUTO: 0.03 10*3/MM3 (ref 0–0.4)
EOSINOPHIL NFR BLD AUTO: 0.4 % (ref 0.3–6.2)
ERYTHROCYTE [DISTWIDTH] IN BLOOD BY AUTOMATED COUNT: 12.9 % (ref 12.3–15.4)
GFR SERPL CREATININE-BSD FRML MDRD: 72 ML/MIN/1.73
GLOBULIN UR ELPH-MCNC: 2 GM/DL
GLUCOSE SERPL-MCNC: 87 MG/DL (ref 65–99)
HCT VFR BLD AUTO: 40 % (ref 34–46.6)
HDLC SERPL-MCNC: 61 MG/DL (ref 40–60)
HGB BLD-MCNC: 13.1 G/DL (ref 12–15.9)
IMM GRANULOCYTES # BLD AUTO: 0.03 10*3/MM3 (ref 0–0.05)
IMM GRANULOCYTES NFR BLD AUTO: 0.4 % (ref 0–0.5)
LDLC SERPL CALC-MCNC: 51 MG/DL (ref 0–100)
LDLC/HDLC SERPL: 0.86 {RATIO}
LYMPHOCYTES # BLD AUTO: 1.22 10*3/MM3 (ref 0.7–3.1)
LYMPHOCYTES NFR BLD AUTO: 15 % (ref 19.6–45.3)
MAGNESIUM SERPL-MCNC: 2.1 MG/DL (ref 1.7–2.3)
MCH RBC QN AUTO: 29.6 PG (ref 26.6–33)
MCHC RBC AUTO-ENTMCNC: 32.8 G/DL (ref 31.5–35.7)
MCV RBC AUTO: 90.3 FL (ref 79–97)
MONOCYTES # BLD AUTO: 0.78 10*3/MM3 (ref 0.1–0.9)
MONOCYTES NFR BLD AUTO: 9.6 % (ref 5–12)
NEUTROPHILS NFR BLD AUTO: 6.03 10*3/MM3 (ref 1.7–7)
NEUTROPHILS NFR BLD AUTO: 74.2 % (ref 42.7–76)
NRBC BLD AUTO-RTO: 0 /100 WBC (ref 0–0.2)
NT-PROBNP SERPL-MCNC: 1346 PG/ML (ref 0–1800)
PLATELET # BLD AUTO: 226 10*3/MM3 (ref 140–450)
PMV BLD AUTO: 9.6 FL (ref 6–12)
POTASSIUM SERPL-SCNC: 4.1 MMOL/L (ref 3.5–5.2)
PROT SERPL-MCNC: 5.9 G/DL (ref 6–8.5)
RBC # BLD AUTO: 4.43 10*6/MM3 (ref 3.77–5.28)
SODIUM SERPL-SCNC: 142 MMOL/L (ref 136–145)
TRIGL SERPL-MCNC: 52 MG/DL (ref 0–150)
TSH SERPL DL<=0.05 MIU/L-ACNC: 3.74 UIU/ML (ref 0.27–4.2)
VLDLC SERPL-MCNC: 12 MG/DL (ref 5–40)
WBC # BLD AUTO: 8.12 10*3/MM3 (ref 3.4–10.8)

## 2021-05-13 PROCEDURE — 83735 ASSAY OF MAGNESIUM: CPT | Performed by: INTERNAL MEDICINE

## 2021-05-13 PROCEDURE — 80061 LIPID PANEL: CPT | Performed by: INTERNAL MEDICINE

## 2021-05-13 PROCEDURE — 80053 COMPREHEN METABOLIC PANEL: CPT | Performed by: INTERNAL MEDICINE

## 2021-05-13 PROCEDURE — 99214 OFFICE O/P EST MOD 30 MIN: CPT | Performed by: INTERNAL MEDICINE

## 2021-05-13 PROCEDURE — 85025 COMPLETE CBC W/AUTO DIFF WBC: CPT | Performed by: INTERNAL MEDICINE

## 2021-05-13 PROCEDURE — 36415 COLL VENOUS BLD VENIPUNCTURE: CPT

## 2021-05-13 PROCEDURE — 84443 ASSAY THYROID STIM HORMONE: CPT | Performed by: INTERNAL MEDICINE

## 2021-05-13 PROCEDURE — 83880 ASSAY OF NATRIURETIC PEPTIDE: CPT | Performed by: INTERNAL MEDICINE

## 2021-05-13 PROCEDURE — 93000 ELECTROCARDIOGRAM COMPLETE: CPT | Performed by: INTERNAL MEDICINE

## 2021-05-13 RX ORDER — BRIMONIDINE TARTRATE 0.1 %
DROPS OPHTHALMIC (EYE)
COMMUNITY
End: 2022-10-03

## 2021-05-13 RX ORDER — BUDESONIDE 3 MG/1
9 CAPSULE, COATED PELLETS ORAL DAILY
COMMUNITY
Start: 2021-05-03 | End: 2022-08-17

## 2021-05-13 NOTE — PROGRESS NOTES
Date of Office Visit: 2021  Encounter Provider: Kylah Jones MD  Place of Service: [facility] CARDIOLOGY  Patient Name: Keyana Nguyen  :1925      Patient ID:  Keyana Nguyen is a 89 y.o. female is here for  followup for         History of Present Illness    She moved to [place] for several years and moved  back. Her daughter is in the room with her here today. When she was in [place], she was  following with a cardiologist there, Dr. Buenrostro, who was with [facility].   She had had imaging studies done there. She had an echocardiogram done on 2015,  showing moderate to severe aortic insufficiency, moderate to severe mitral insufficiency,  aortic valvular sclerosis without stenosis, concentric left ventricular hypertrophy with  ejection fraction of 63%, and elevated right ventricular systolic pressure of 48 mmHg.   She had had a prior echocardiogram done on 10/01/2014, which was more normal in appearance  so she definitely had progression of her valvular heart disease. In addition, she had a  carotid duplex study done on 2014, showing less than 40% right internal carotid  artery stenosis and 20%-40% left internal carotid artery stenosis. She has a known  history of premature atrial complexes and a history of vertigo as well.     When she came in, she was in atrial fibrillation, which was new  Her [id number] score, she has a 3.2% annual risk of stroke.     She has a strong family history of stroke.  She lives in [place] assisted living and does continue to drive.     She had a Holter recording done 2015 which showed underlying atrial  fibrillation with average heart rate of 83 beats per minute and no significant pauses.     She had an echo done 3/2016 showing ejection fraction 62%, moderate left atrial enlargement, mild-to-moderate mitral insufficiency, mild aortic insufficiency, mild-to-moderate tricuspid insufficiency.     She  has mild exertional dyspnea, especially on stairs.  She does not feel her heart racing or skipping.  She has had a couple of falls due to losing her balance but generally walks well and continues to exercise.  She has no exertional chest tightness or pressure.  She has no fevers, chills or cough.  She is taking her medications as directed without difficulty.  She is some slight numbness in her toes.  She has severe venous incompetence of her legs.  She has had no vomiting blood or blood in stool.  Her blood pressure gets checked at her assisted living and it runs 130s over 80s.    Past Medical History:   Diagnosis Date   • Aortic regurgitation    • Atrial fibrillation (CMS/HCC)    • Breast cancer (CMS/HCC)    • Essential hypertension    • Fatigue    • Herpes zoster with complication 10/3/2016   • Hypothyroidism    • Irritable bowel syndrome    • Mitral valve insufficiency    • Nonrheumatic aortic (valve) insufficiency    • Osteopenia of the elderly    • Postmenopausal status    • Vitamin B12 deficiency    • Vitamin D deficiency          Past Surgical History:   Procedure Laterality Date   • BREAST BIOPSY     • BREAST SURGERY     • HYSTERECTOMY     • MASTECTOMY     • OOPHORECTOMY     • ORTHOPEDIC SURGERY     • TONSILLECTOMY AND ADENOIDECTOMY     • TOTAL KNEE ARTHROPLASTY         Current Outpatient Medications on File Prior to Visit   Medication Sig Dispense Refill   • Acetaminophen (TYLENOL EXTRA STRENGTH PO) Take 1 tablet by mouth Daily.     • acidophilus (FLORANEX) tablet tablet TAKE ONE TABLET BY MOUTH BID  0   • apixaban (Eliquis) 2.5 MG tablet tablet Take 1 tablet by mouth Every 12 (Twelve) Hours. 180 tablet 0   • bimatoprost (LUMIGAN) 0.01 % ophthalmic drops Administer 1 drop to both eyes Every Night.     • Biotin 5000 MCG capsule Take 1 capsule by mouth daily.     • brimonidine (Alphagan P) 0.1 % solution ophthalmic solution Alphagan P 0.1 % eye drops     • Budesonide (ENTOCORT EC) 3 MG 24 hr capsule Take 9 mg  "by mouth Daily.     • calcium carbonate (OS-KAMAR) 600 MG tablet Take 600 mg by mouth Daily.     • Cetirizine HCl (ZYRTEC ALLERGY PO) Take 1 tablet by mouth Daily.     • Cholecalciferol (VITAMIN D) 1000 UNITS tablet Take 1 tablet by mouth daily.     • gabapentin (NEURONTIN) 100 MG capsule Take 1 capsule by mouth 3 (Three) Times a Day. 270 capsule 0   • levothyroxine (SYNTHROID, LEVOTHROID) 25 MCG tablet Take 1 tablet by mouth Daily. 90 tablet 3   • lisinopril (PRINIVIL,ZESTRIL) 20 MG tablet Take 1 tablet by mouth Daily. 90 tablet 0   • montelukast (SINGULAIR) 10 MG tablet Take 1 tablet by mouth Every Night. 90 tablet 3   • Multiple Vitamins-Minerals (CENTRUM SILVER) tablet Take 1 tablet by mouth daily.     • vitamin B-12 (CYANOCOBALAMIN) 1000 MCG tablet Take 1,000 mcg by mouth Daily.     • vitamin C (ASCORBIC ACID) 500 MG tablet Take 1 tablet by mouth daily.       No current facility-administered medications on file prior to visit.       Social History     Socioeconomic History   • Marital status:      Spouse name: Not on file   • Number of children: Not on file   • Years of education: Not on file   • Highest education level: Not on file   Tobacco Use   • Smoking status: Never Smoker   • Smokeless tobacco: Never Used   Substance and Sexual Activity   • Alcohol use: No     Comment: caffeine use: 6 cups daily    • Drug use: No   • Sexual activity: Never           ROS    Procedures    ECG 12 Lead    Date/Time: 5/13/2021 8:57 AM  Performed by: Kylah Jones MD  Authorized by: Kylah Jones MD   Comparison: compared with previous ECG   Similar to previous ECG  Rhythm: atrial fibrillation  Q waves: V1, V2 and V3      Clinical impression: abnormal EKG                Objective:      Vitals:    05/13/21 0847   BP: 150/70   BP Location: Left arm   Pulse: 82   Weight: 49.8 kg (109 lb 12.8 oz)   Height: 160 cm (63\")     Body mass index is 19.45 kg/m².    Vitals reviewed.   Constitutional:       General: Not " in acute distress.     Appearance: Well-developed. Not diaphoretic.   Eyes:      General: No scleral icterus.     Conjunctiva/sclera: Conjunctivae normal.   HENT:      Head: Normocephalic and atraumatic.   Neck:      Thyroid: No thyromegaly.      Vascular: No carotid bruit or JVD.      Lymphadenopathy: No cervical adenopathy.   Pulmonary:      Effort: Pulmonary effort is normal. No respiratory distress.      Breath sounds: Normal breath sounds. No wheezing. No rhonchi. No rales.   Chest:      Chest wall: Not tender to palpatation.   Cardiovascular:      Normal rate. Irregularly irregular rhythm.      Murmurs: There is a grade 2/6 holosystolic murmur at the LLSB and ULSB.      No gallop.      Comments: Severe venous varicosities  Pulses:     Intact distal pulses.   Edema:     Peripheral edema absent.   Abdominal:      General: Bowel sounds are normal. There is no distension or abdominal bruit.      Palpations: Abdomen is soft. There is no abdominal mass.      Tenderness: There is no abdominal tenderness.   Musculoskeletal:         General: No deformity.      Extremities: No clubbing present.     Cervical back: Neck supple. Skin:     General: Skin is warm and dry. There is no cyanosis.      Coloration: Skin is not pale.      Findings: No rash.   Neurological:      Mental Status: Alert and oriented to person, place, and time.      Cranial Nerves: No cranial nerve deficit.   Psychiatric:         Judgment: Judgment normal.         Lab Review:       Assessment:      Diagnosis Plan   1. Chronic atrial fibrillation (CMS/HCC)  Lipid Panel    Magnesium    Comprehensive Metabolic Panel    CBC & Differential    TSH    proBNP   2. Nonrheumatic aortic valve insufficiency  Lipid Panel    Magnesium    Comprehensive Metabolic Panel    CBC & Differential    TSH    proBNP   3. Nonrheumatic mitral valve regurgitation  Lipid Panel    Magnesium    Comprehensive Metabolic Panel    CBC & Differential    TSH    proBNP   4. Essential  hypertension  Lipid Panel    Magnesium    Comprehensive Metabolic Panel    CBC & Differential    TSH    proBNP   5. Dyspnea on exertion   proBNP     1.  Atrial fibrillation.  On Eliquis 2.5 mg twice daily, she has a  [id number] score, which gives her a high annual risk of stroke at 3.2%.    2.  mild to moderate mitral insufficiency.    3.  Mild aortic insufficiency.   4.  Mild to moderate tricuspid insufficiency.   5.  Mild carotid artery stenosis.    6.  Urinary incontinence.    7.  History of right breast cancer.    8.  Chronic venous insufficiency.  She is elevating her legs and wearing compression  stockings.    9.  Weight loss.  She is gaining weight.    10.  Strong family history of stroke.       Plan:       See Claudine in 1 year, check laboratory values today, no medication changes.  Her blood pressure was high today because she felt pressured to get here on time as she had to park in the very back of the parking lot.

## 2021-05-30 DIAGNOSIS — I10 ESSENTIAL HYPERTENSION: ICD-10-CM

## 2021-06-01 NOTE — TELEPHONE ENCOUNTER
Refills requested on Eliquis 2.5MG & Lisinopril 20 Mg   Last office visit 5/13/2021   Last EKG 5/13/2021  Next office visit 5/17/2022 -- TK   Labs done 5/13/2021

## 2021-06-03 RX ORDER — APIXABAN 2.5 MG/1
TABLET, FILM COATED ORAL
Qty: 180 TABLET | Refills: 0 | Status: SHIPPED | OUTPATIENT
Start: 2021-06-03 | End: 2021-06-21 | Stop reason: SDUPTHER

## 2021-06-03 RX ORDER — LISINOPRIL 20 MG/1
TABLET ORAL
Qty: 90 TABLET | Refills: 0 | Status: SHIPPED | OUTPATIENT
Start: 2021-06-03 | End: 2021-06-21 | Stop reason: SDUPTHER

## 2021-06-21 DIAGNOSIS — I10 ESSENTIAL HYPERTENSION: ICD-10-CM

## 2021-06-21 RX ORDER — LISINOPRIL 20 MG/1
20 TABLET ORAL DAILY
Qty: 90 TABLET | Refills: 3 | Status: SHIPPED | OUTPATIENT
Start: 2021-06-21 | End: 2021-09-08

## 2021-07-26 ENCOUNTER — TELEPHONE (OUTPATIENT)
Dept: CARDIOLOGY | Facility: CLINIC | Age: 86
End: 2021-07-26

## 2021-07-26 NOTE — TELEPHONE ENCOUNTER
Pt is calling at the request of her PCP to let you know that going on 3 weeks now her big toe is discolored and swollen.  She said her PCP did an xray and she is seeing her today for more testing.  I was going to send her to the ER, but she is seeing her PCP again today for this.  The PCP may reach out to us for further direction.  Thanks  Mary Jane Madrigal RN  Triage nurse

## 2021-07-27 NOTE — TELEPHONE ENCOUNTER
Pt saw PCP yesterday and is continuing to do more testing. Pt stated the toe is looking better today.  Thanks  Mary Jane Madrigal RN  Triage nurse

## 2021-08-16 ENCOUNTER — TELEPHONE (OUTPATIENT)
Dept: CARDIOLOGY | Facility: CLINIC | Age: 86
End: 2021-08-16

## 2021-08-16 NOTE — TELEPHONE ENCOUNTER
I spoke with patient via telephone.  I overheard the  talking with her and the patient was having multiple symptoms so I triaged the call.  Over the weekend and now she is having nausea, intermittent arm numbness/tingling bilaterally, head spinning, head feels weird, eyes feel dull, and her balance is off.      I explained that I do not think that this is cardiac related and that I need her to go to the emergency department to get evaluated.  She is going to call her daughter.

## 2021-09-08 DIAGNOSIS — I10 ESSENTIAL HYPERTENSION: ICD-10-CM

## 2021-09-08 RX ORDER — LISINOPRIL 20 MG/1
TABLET ORAL
Qty: 90 TABLET | Refills: 3 | Status: SHIPPED | OUTPATIENT
Start: 2021-09-08 | End: 2022-02-04 | Stop reason: SDUPTHER

## 2022-01-28 NOTE — TELEPHONE ENCOUNTER
Patient stated that she changed her insurance and will need a prescription of eliquis sent into her new pharmacy that will take her insurance.  She would like for [facility] to go to [facility].    Beata

## 2022-02-04 DIAGNOSIS — I10 ESSENTIAL HYPERTENSION: ICD-10-CM

## 2022-02-04 RX ORDER — LISINOPRIL 20 MG/1
20 TABLET ORAL DAILY
Qty: 90 TABLET | Refills: 3 | Status: SHIPPED | OUTPATIENT
Start: 2022-02-04 | End: 2022-07-25 | Stop reason: SDUPTHER

## 2022-04-04 ENCOUNTER — TELEPHONE (OUTPATIENT)
Dept: CARDIOLOGY | Facility: CLINIC | Age: 87
End: 2022-04-04

## 2022-04-04 NOTE — TELEPHONE ENCOUNTER
Patient has been falling more frequently, about 5-6 times since Sebastian.  She stated that she is not tripping, she is just standing and falling.  Daughter stated that she has just skinned her knee or her face with all the other falls except this time.  She stated that this last time she fell and cracked all but 3 ribs.  She stated that the MDs at the hospital ([facility]) are wanting to stop Eliquis and wanted to know what you think about this? Patient daughter is concerned about the family history of longo and would like your advice. Please advise.     Daughter- [phone]  Patient CB: [phone]    Thanks,  Beata

## 2022-07-18 ENCOUNTER — TELEPHONE (OUTPATIENT)
Dept: CARDIOLOGY | Facility: CLINIC | Age: 87
End: 2022-07-18

## 2022-07-18 NOTE — TELEPHONE ENCOUNTER
Received call from Loraine Keyana Nguyen's daughter.  Per Loraine, patient recently left inpatient rehab and over the last 4 days has developed increased swelling in her lower extremities, left leg more so than the right.  Patient reports left legs is painful, but denies color and sensation changes or calf pain.  Per Loraine, swelling is up to the calf.      Cardiac Meds  Lisinopril 50mg, dialy  Apixaban 2.5 mg, BID (d/c in April per Loraine by Dr. Jones)  Metoprolol 25mg, BID  Amlodipine 5mg, daily    /65, HR 76    Discussed with JAYSON Pemberton: patient can be seen by any APRN this week, I can see her on 7/19 at 130pm    Offered appointment on 7/19 at 130 pm however, Loraine requested appointment on 7/20 at 11am.  Loraine did state that if Dr. Jones thinks patient needs to be seen tomorrow (7/19), she can and will bring patient to that appointment.    Encouraged patient to elevate legs to help with swelling and she verbalized understanding.  Educated on signs/symptoms to monitor for and when to present to ED for evaluation.    Please let me know if there is anything else you would like me to do for this patient.    Thank you,  Jennifer Sandhu RN  Triage Nurse [facility] [place]

## 2022-07-19 ENCOUNTER — OFFICE VISIT (OUTPATIENT)
Dept: CARDIOLOGY | Facility: CLINIC | Age: 87
End: 2022-07-19

## 2022-07-19 VITALS
DIASTOLIC BLOOD PRESSURE: 64 MMHG | WEIGHT: 98 LBS | SYSTOLIC BLOOD PRESSURE: 112 MMHG | OXYGEN SATURATION: 95 % | BODY MASS INDEX: 17.36 KG/M2 | HEART RATE: 61 BPM

## 2022-07-19 DIAGNOSIS — R29.6 FREQUENT FALLS: ICD-10-CM

## 2022-07-19 DIAGNOSIS — M79.89 LEG SWELLING: Primary | ICD-10-CM

## 2022-07-19 DIAGNOSIS — I10 ESSENTIAL HYPERTENSION: ICD-10-CM

## 2022-07-19 DIAGNOSIS — I48.21 PERMANENT ATRIAL FIBRILLATION: ICD-10-CM

## 2022-07-19 DIAGNOSIS — I36.1 NONRHEUMATIC TRICUSPID VALVE REGURGITATION: ICD-10-CM

## 2022-07-19 DIAGNOSIS — I34.0 NONRHEUMATIC MITRAL VALVE REGURGITATION: ICD-10-CM

## 2022-07-19 DIAGNOSIS — E03.9 ACQUIRED HYPOTHYROIDISM: ICD-10-CM

## 2022-07-19 DIAGNOSIS — I35.1 NONRHEUMATIC AORTIC VALVE INSUFFICIENCY: ICD-10-CM

## 2022-07-19 PROCEDURE — 99214 OFFICE O/P EST MOD 30 MIN: CPT | Performed by: NURSE PRACTITIONER

## 2022-07-19 PROCEDURE — 93000 ELECTROCARDIOGRAM COMPLETE: CPT | Performed by: NURSE PRACTITIONER

## 2022-07-19 NOTE — PROGRESS NOTES
[facility] CARDIOLOGY  [address]  [facility] [address]  [facility] [address]  Phone: [phone]      Patient Name: Keyana Nguyen  :1925  Age: 89 y.o.  Primary Cardiologist: Kylah Jones MD  Encounter Provider:  DUSTIN Douglass      Chief Complaint     Chief Complaint: Follow-up  Leg swelling    SUBJECTIVE     History of Present Illness:  Keyana Nguyen is a 89 y.o.  female whose medical history includes hypertension, hypothyroidism, and history of right breast cancer.  She is followed in our office by Dr. Jones for atrial fibrillation, aortic insufficiency, mitral insufficiency, tricuspid insufficiency.     22 Follow-up:  She is here for follow-up of leg swelling and I am seeing her for the first time today. She's had a bit of a rough year. She fell in 2021 and hurt her neck; she was released from the ER. She then fell in 2022 and broke several left ribs. She was admitted to [facility] but was readmitted shortly after discharge for ESBL UTI. She was then discharged to rehab. She's been home for almost a week; she has returned to assisted living with home health. Her legs started since she's been home; the is more swelling the left leg and it is painful. Her anticoagulant was stopped during hospitalization due to falls. Her breathing is comfortable and she denies orthopnea. BP has been checked by home health. Her appetite is good and she has gained 10 lbs since hospital discharge. No chest pain or palpitations.     Below is a summary of pertinent cardiology findings:  • Previously followed with Dr. Buenrostro at [facility] in [place].  • History of PACs.  • 2014 carotid artery duplex: Less than 40% right internal carotid artery stenosis and 20 to 40% left internal carotid artery stenosis.  • 3/23/2015 echocardiogram: Moderate to severe aortic insufficiency, moderate to severe mitral insufficiency, aortic valvular  sclerosis without stenosis, concentric left ventricular hypertrophy, EF 63%, RVSP 48 mmHg.  Were significant changes compared to echocardiogram in October 2014.  • 6/30/2015 Holter monitor: Underlying atrial fibrillation with average heart rate 83 bpm and no significant pauses.  This was a new diagnosis.  • March 2016 echocardiogram: EF 62%, moderate left atrial enlargement, mild to moderate mitral insufficiency, mild aortic insufficiency, mild to moderate tricuspid insufficiency.    Past Medical History:   Diagnosis Date   • Aortic regurgitation    • Atrial fibrillation (HCC)    • Breast cancer (HCC)    • Essential hypertension    • Fatigue    • Herpes zoster with complication 10/3/2016   • Hypothyroidism    • Irritable bowel syndrome    • Mitral valve insufficiency    • Nonrheumatic aortic (valve) insufficiency    • Osteopenia of the elderly    • Postmenopausal status    • Vitamin B12 deficiency    • Vitamin D deficiency        Past Surgical History:  · Right mastectomy  · hysterectomy with oophorectomy  · total knee arthroplasty  · Tonsillectomy and adenoidectomy    Social History     Socioeconomic History   • Marital status:    Tobacco Use   • Smoking status: Never Smoker   • Smokeless tobacco: Never Used   Substance and Sexual Activity   • Alcohol use: No     Comment: caffeine use: 6 cups daily    • Drug use: No   • Sexual activity: Never         Review of Systems     Review of Systems   Cardiovascular: Positive for leg swelling, near-syncope and orthopnea. Negative for chest pain, dyspnea on exertion, irregular heartbeat and syncope.   Musculoskeletal: Positive for falls.       Medications     Allergies as of 07/19/2022   • (No Known Allergies)       Acetaminophen, Biotin, Budesonide, Cetirizine HCl, acidophilus, bimatoprost, brimonidine, calcium carbonate, cholecalciferol, levothyroxine, lisinopril, montelukast, multivitamin with minerals, and vitamin B-12       OBJECTIVE     Vital Signs:   /64    Pulse 61   Wt 44.5 kg (98 lb)   SpO2 95%   BMI 17.36 kg/m²       Weight:  Wt Readings from Last 3 Encounters:   22 44.5 kg (98 lb)   21 49.8 kg (109 lb 12.8 oz)   19 51.8 kg (114 lb 3.2 oz)     Body mass index is 17.36 kg/m².      Physical Exam     Physical Exam  Constitutional:       General: She is not in acute distress.  HENT:      Head: Normocephalic and atraumatic.      Mouth/Throat:      Mouth: Mucous membranes are moist.   Eyes:      General: No scleral icterus.     Extraocular Movements: Extraocular movements intact.      Conjunctiva/sclera: Conjunctivae normal.      Pupils: Pupils are equal, round, and reactive to light.   Cardiovascular:      Rate and Rhythm: Normal rate. Rhythm irregularly irregular.      Pulses: Normal pulses.      Heart sounds: S1 normal and S2 normal.      Comments: L>R leg swelling. Bruising to left lateral calf after injury from wheelchair. No redness, warmth, or weeping.   Pulmonary:      Effort: No respiratory distress.      Breath sounds: Normal breath sounds. No wheezing, rhonchi or rales.   Abdominal:      General: Bowel sounds are normal. There is no distension.      Palpations: Abdomen is soft.      Tenderness: There is no abdominal tenderness.   Musculoskeletal:         General: Normal range of motion.      Cervical back: Normal range of motion and neck supple.      Right lower le+ Pitting Edema present.      Left lower le+ Pitting Edema present.   Skin:     General: Skin is warm and dry.      Coloration: Skin is not jaundiced.   Neurological:      Mental Status: She is alert and oriented to person, place, and time.   Psychiatric:         Mood and Affect: Mood normal.         Reviewed Data     Result Review :  The following data was reviewed by DUSTIN Douglass on 22:  • Labs 2022:  cr 0.6, K 4.4, otherwise unremarkable CMP, Hgb 9.8, Plt 105  • Labs 2022:  TSH 2.01  • Labs 2022:  cr 0.7, K 3.0, otherwise  unremarkable CMP, Hgb 9.2, Plt 278      ECG 12 Lead    Date/Time: 7/19/2022 4:05 PM  Performed by: Mechelle Pemberton APRN  Authorized by: Mechelle Pemberton APRN   Comparison: compared with previous ECG from 5/13/2021  Similar to previous ECG  Rhythm: atrial fibrillation  Rate: bradycardic  BPM: 54    Clinical impression: normal ECG            Assessment and Plan        Assessment and Plan     Assessment:  1. Leg swelling    2. Permanent atrial fibrillation (HCC)    3. Nonrheumatic aortic valve insufficiency    4. Nonrheumatic mitral valve regurgitation    5. Nonrheumatic tricuspid valve regurgitation    6. Essential hypertension    7. Acquired hypothyroidism    8. Frequent falls         1. Leg swelling: She has had bilateral leg swelling though left greater than right since her discharge from rehab facility.  She did get periodic doses of IV Lasix while in rehab.  Her breathing is comfortable and she has no orthopnea.  Suspect this is a side effect of amlodipine.  2. Atrial fibrillation: This is permanent and rate controlled.  Her anticoagulation was stopped during hospitalization due to frequent falls.  3. Aortic insufficiency: Noted on echocardiogram in March 2015 and again on echo and March 2016; graded as mild in 2016.  4. Mitral insufficiency: Noted on echocardiogram from March 2015 and again echo on March 2016; graded as mild to moderate.  5. Tricuspid insufficiency: Noted on echocardiogram from March 2015 and again on echo in March 2016; graded as mild to moderate.  6. Hypertension: BP readings at rehab facility were 110s to 140s over 70s.  BP is low normal today; concerned about orthostatic hypotension and her history of falls.  7. Hypothyroidism she is treated with levothyroxine and TSH was within range when checked in June 2022.  8. Frequent falls; she had 2 falls over the past year resulting in injury.  Her anticoagulation has now been stopped.  She is just been discharged from rehab  facility and is being followed by home health.  She walks with a walker.      Plan:  1. I stopped her amlodipine today for concern about orthostatic hypotension and leg swelling.  I will call and check on her this Friday to assess swelling, breathing, and blood pressure.  2. We will not evaluate discrepant edema further with imaging; she would not be a candidate to resume anticoagulation.  3. She will follow-up with me in 3 months and Dr. Jones in 6 months.      Follow Up:  Return in about 3 months (around 10/19/2022) for Follow-up with DUSTIN Rodriguez. 6 months with Dr. Jones.  Orders Placed This Encounter   Procedures   • ECG 12 Lead      No orders of the defined types were placed in this encounter.        Thank you the opportunity to participate in this patient's care.    DUSTIN Murillo    This office note has been dictated.

## 2022-07-19 NOTE — TELEPHONE ENCOUNTER
Called Loraine Keyana Nguyen's daughter and reviewed recommendation.  Loraine verbalized understanding and patient is scheduled to see JAYSON Pemberton today at 130pm.    Please let me know if there is anything else you would like me to do for this patient.    Thank you,  Jennifer Sandhu RN  Triage Nurse [facility] [place]

## 2022-07-22 ENCOUNTER — TELEPHONE (OUTPATIENT)
Dept: CARDIOLOGY | Facility: CLINIC | Age: 87
End: 2022-07-22

## 2022-07-22 NOTE — TELEPHONE ENCOUNTER
I spoke with her daughter. Leg swelling is better off amlodipine. Home health is checking BP. No changes at this time.     VERONA

## 2022-07-22 NOTE — TELEPHONE ENCOUNTER
----- Message from DUSTIN Douglass sent at 7/19/2022  4:19 PM EDT -----  BP and leg swelling off amlodipine

## 2022-07-25 DIAGNOSIS — I10 ESSENTIAL HYPERTENSION: ICD-10-CM

## 2022-07-25 RX ORDER — LISINOPRIL 20 MG/1
20 TABLET ORAL DAILY
Qty: 90 TABLET | Refills: 3 | Status: SHIPPED | OUTPATIENT
Start: 2022-07-25 | End: 2022-08-17

## 2022-07-25 NOTE — TELEPHONE ENCOUNTER
Patient CG called, Loraine, and she requested a refill on Metoprolol 25 mg 1/2 tab BID and Lisinopril 20 mg daily to be sent to [facility] in [place], [place] ([phone]).    Next OV-10/20/22-TMM  Last OV-07/19/22-TMM    Plan:  1. I stopped her amlodipine today for concern about orthostatic hypotension and leg swelling.  I will call and check on her this Friday to assess swelling, breathing, and blood pressure.  2. We will not evaluate discrepant edema further with imaging; she would not be a candidate to resume anticoagulation.  3. She will follow-up with me in 3 months and Dr. Jones in 6 months.        Follow Up:  Return in about 3 months (around 10/19/2022) for Follow-up with Larissa Marroquin- 05/23/22    Metoprolol was not on the medication list but CG verified that she is currently taking the medication.  Please advise.    CB: [phone]    ThanksBeata

## 2022-08-15 ENCOUNTER — TELEPHONE (OUTPATIENT)
Dept: FAMILY MEDICINE CLINIC | Facility: CLINIC | Age: 87
End: 2022-08-15

## 2022-08-16 ENCOUNTER — TELEPHONE (OUTPATIENT)
Dept: CARDIOLOGY | Facility: CLINIC | Age: 87
End: 2022-08-16

## 2022-08-16 NOTE — TELEPHONE ENCOUNTER
Please schedule with Larissa Pemberton if possible for tomorrow.  She recently saw her in the office.  If the daughter feels that she is unstable, then she needs to present to the ED today.  Thank you.

## 2022-08-16 NOTE — TELEPHONE ENCOUNTER
RM patient    Vickie, a  nurse with [facility], called triage to report leg swelling on her HH visit today.      Nurse reports 4+ pitting edema, L>R.  Patient denies pain in BLE, SOA, cough, CP.  VS today /78, HR 60, O2 100%on RA.    LOV was 7/19 with MM:       Per daughter, leg swelling was better initially off amlodipine and then returned.  She is not currently on diuretics.  Hx of afib, not on AC due to hx of falls.      Any recommendations?    Bonnie Boogie RN  [facility] Triage  08/16/22 13:54 EDT

## 2022-08-16 NOTE — TELEPHONE ENCOUNTER
Ludy,    Called and spoke with patient's daughter, Loraine, who transports her to appointments.  She agrees with having her seen tomorrow and is available to bring her.  Added her on with you tomorrow, 8/17 at 2:30.  This is an  patient, Larissa saw her recently in July for similar issues, but MM or TK do not have any appts the rest of the week.    Attempted to call Vickie, nurse with [facility] [place], to give her an update but it went to .    Thank you,  Bonnie Boogie RN  [facility] Triage  08/16/22 16:25 EDT

## 2022-08-17 ENCOUNTER — OFFICE VISIT (OUTPATIENT)
Dept: CARDIOLOGY | Facility: CLINIC | Age: 87
End: 2022-08-17

## 2022-08-17 VITALS
BODY MASS INDEX: 17.89 KG/M2 | OXYGEN SATURATION: 95 % | WEIGHT: 101 LBS | SYSTOLIC BLOOD PRESSURE: 128 MMHG | DIASTOLIC BLOOD PRESSURE: 72 MMHG | HEIGHT: 63 IN | HEART RATE: 55 BPM

## 2022-08-17 DIAGNOSIS — R29.6 FALLS FREQUENTLY: ICD-10-CM

## 2022-08-17 DIAGNOSIS — I35.1 NONRHEUMATIC AORTIC VALVE INSUFFICIENCY: ICD-10-CM

## 2022-08-17 DIAGNOSIS — I34.0 NONRHEUMATIC MITRAL VALVE REGURGITATION: ICD-10-CM

## 2022-08-17 DIAGNOSIS — I48.21 PERMANENT ATRIAL FIBRILLATION: ICD-10-CM

## 2022-08-17 DIAGNOSIS — E03.9 ACQUIRED HYPOTHYROIDISM: ICD-10-CM

## 2022-08-17 DIAGNOSIS — I10 ESSENTIAL HYPERTENSION: ICD-10-CM

## 2022-08-17 DIAGNOSIS — I36.1 NONRHEUMATIC TRICUSPID VALVE REGURGITATION: ICD-10-CM

## 2022-08-17 DIAGNOSIS — M79.89 LEG SWELLING: Primary | ICD-10-CM

## 2022-08-17 PROCEDURE — 93000 ELECTROCARDIOGRAM COMPLETE: CPT | Performed by: NURSE PRACTITIONER

## 2022-08-17 PROCEDURE — 99214 OFFICE O/P EST MOD 30 MIN: CPT | Performed by: NURSE PRACTITIONER

## 2022-08-17 RX ORDER — FUROSEMIDE 20 MG/1
20 TABLET ORAL DAILY
Qty: 30 TABLET | Refills: 11 | Status: SHIPPED | OUTPATIENT
Start: 2022-08-17 | End: 2022-08-25 | Stop reason: SDUPTHER

## 2022-08-17 NOTE — TELEPHONE ENCOUNTER
It's up to you!  I don't mind seeing her, but also think it would probably be best if you did since you saw her recently for similar issues.  Thanks!

## 2022-08-17 NOTE — PROGRESS NOTES
[facility] CARDIOLOGY  [address]  [facility] [address]  [place] [facility] [address]  Phone: [phone]      Patient Name: Keyana Nguyen  :1925  Age: 89 y.o.  Primary Cardiologist: Kylah Jones MD  Encounter Provider:  DUSTIN Douglass      Chief Complaint     Chief Complaint: Hypertension, Atrial Fibrillation, and Follow-up  Leg swelling    SUBJECTIVE     History of Present Illness:  Keyana Nguyen is a 89 y.o.  female whose medical history includes hypertension, hypothyroidism, and history of right breast cancer.  She is followed in our office by Dr. Jones for atrial fibrillation, aortic insufficiency, mitral insufficiency, tricuspid insufficiency.     22 Follow-up:  She is here for follow-up of leg swelling.  At last visit I stopped her amlodipine and swelling improved.  However over the last 2 weeks she has had more leg swelling and has had difficulty putting on compression stockings and her shoes.  The swelling has gotten worse the last week and she started to get blisters on her legs.  She does have some dyspnea with exertion but this improves with rest.  Home health is coming to see her and her blood pressure is running 120s/70s.  She has had no falls since she was released from rehab.  She denies chest pain, orthopnea, or palpitations.  Her appetite is just fair.  She is having diarrhea and this has been a chronic issue.    Below is a summary of pertinent cardiology findings:  • Previously followed with Dr. Buenrostro at [place] in [place].  • History of PACs.  • 2014 carotid artery duplex: Less than 40% right internal carotid artery stenosis and 20 to 40% left internal carotid artery stenosis.  • 3/23/2015 echocardiogram: Moderate to severe aortic insufficiency, moderate to severe mitral insufficiency, aortic valvular sclerosis without stenosis, concentric left ventricular hypertrophy, EF 63%, RVSP 48 mmHg.  Were significant  "changes compared to echocardiogram in October 2014.  • 6/30/2015 Holter monitor: Underlying atrial fibrillation with average heart rate 83 bpm and no significant pauses.  This was a new diagnosis.  • March 2016 echocardiogram: EF 62%, moderate left atrial enlargement, mild to moderate mitral insufficiency, mild aortic insufficiency, mild to moderate tricuspid insufficiency.    Past Medical History:   Diagnosis Date   • Aortic regurgitation    • Atrial fibrillation (HCC)    • Breast cancer (HCC)    • Essential hypertension    • Fatigue    • Herpes zoster with complication 10/3/2016   • Hypothyroidism    • Irritable bowel syndrome    • Mitral valve insufficiency    • Nonrheumatic aortic (valve) insufficiency    • Osteopenia of the elderly    • Postmenopausal status    • Vitamin B12 deficiency    • Vitamin D deficiency        Past Surgical History:  · Right mastectomy  · hysterectomy with oophorectomy  · total knee arthroplasty  · Tonsillectomy and adenoidectomy    Social History     Socioeconomic History   • Marital status:    Tobacco Use   • Smoking status: Never Smoker   • Smokeless tobacco: Never Used   Substance and Sexual Activity   • Alcohol use: No     Comment: caffeine use: 6 cups daily    • Drug use: No   • Sexual activity: Never         Review of Systems     Review of Systems   Cardiovascular: Positive for dyspnea on exertion and leg swelling. Negative for chest pain, irregular heartbeat, near-syncope, orthopnea and syncope.   Musculoskeletal: Positive for falls.   Gastrointestinal: Positive for diarrhea.       Medications     Allergies as of 08/17/2022   • (No Known Allergies)       Acetaminophen, Biotin, bimatoprost, brimonidine, calcium carbonate, cholecalciferol, furosemide, levothyroxine, metoprolol tartrate, multivitamin with minerals, and vitamin B-12       OBJECTIVE     Vital Signs:   /72   Pulse 55   Ht 160 cm (63\")   Wt 45.8 kg (101 lb)   SpO2 95%   BMI 17.89 kg/m²   "     Weight:  Wt Readings from Last 3 Encounters:   22 45.8 kg (101 lb)   22 44.5 kg (98 lb)   21 49.8 kg (109 lb 12.8 oz)     Body mass index is 17.89 kg/m².      Physical Exam     Physical Exam  Constitutional:       General: She is not in acute distress.  HENT:      Head: Normocephalic and atraumatic.      Mouth/Throat:      Mouth: Mucous membranes are moist.   Eyes:      General: No scleral icterus.     Extraocular Movements: Extraocular movements intact.      Conjunctiva/sclera: Conjunctivae normal.      Pupils: Pupils are equal, round, and reactive to light.   Cardiovascular:      Rate and Rhythm: Normal rate. Rhythm irregularly irregular.      Pulses: Normal pulses.      Heart sounds: S1 normal and S2 normal.      Comments: L>R leg swelling. Bruising to left lateral calf after injury from wheelchair. No redness, warmth, or weeping.   Pulmonary:      Effort: No respiratory distress.      Breath sounds: Normal breath sounds. No wheezing, rhonchi or rales.   Abdominal:      General: Bowel sounds are normal. There is no distension.      Palpations: Abdomen is soft.      Tenderness: There is no abdominal tenderness.   Musculoskeletal:         General: Normal range of motion.      Cervical back: Normal range of motion and neck supple.      Right lower le+ Pitting Edema present.      Left lower le+ Pitting Edema present.   Skin:     General: Skin is warm and dry.      Coloration: Skin is not jaundiced.   Neurological:      Mental Status: She is alert and oriented to person, place, and time.   Psychiatric:         Mood and Affect: Mood normal.         Reviewed Data     Result Review :  The following data was reviewed by DUSTIN Douglass on 22:  • Labs 2022:  cr 0.6, K 4.4, otherwise unremarkable CMP, Hgb 9.8, Plt 105  • Labs 2022:  TSH 2.01  • Labs 2022:  cr 0.7, K 3.0, otherwise unremarkable CMP, Hgb 9.2, Plt 278  • 22 no new labs to  review.      ECG 12 Lead    Date/Time: 8/17/2022 3:25 PM  Performed by: Mechelle Pemberton APRN  Authorized by: Mechelle Pemberton APRN   Comparison: compared with previous ECG from 7/19/2022  Similar to previous ECG  Rhythm: atrial fibrillation  Rate: normal  BPM: 86    Clinical impression: abnormal EKG            Assessment and Plan        Assessment and Plan     Assessment:  1. Leg swelling    2. Permanent atrial fibrillation (HCC)    3. Nonrheumatic aortic valve insufficiency    4. Nonrheumatic mitral valve regurgitation    5. Nonrheumatic tricuspid valve regurgitation    6. Essential hypertension    7. Acquired hypothyroidism    8. Falls frequently         1. Leg swelling: Initially better after stopping amlodipine, however this is gotten worse over the last 2 weeks.  She is now having blisters on her legs.  2. Atrial fibrillation: This is permanent and rate controlled.  Her anticoagulation was stopped during hospitalization due to frequent falls.  Her heart rate is a little more elevated today, I suspect this is due to dyspnea with exertion due to increased peripheral edema.  3. Aortic insufficiency: Noted on echocardiogram in March 2015 and again on echo and March 2016; graded as mild in 2016.  I suspect she has some diastolic heart failure.  4. Mitral insufficiency: Noted on echocardiogram from March 2015 and again echo on March 2016; graded as mild to moderate.  Same as #3  5. Tricuspid insufficiency: Noted on echocardiogram from March 2015 and again on echo in March 2016; graded as mild to moderate.  Same as #3.  6. Hypertension: BP better controlled off amlodipine though I worry about overcontrolled blood pressure with diuresis.  7. Hypothyroidism she is treated with levothyroxine and TSH was within range when checked in June 2022.  Levothyroxine dose is the same.  8. Frequent falls; she had 2 falls over the past year resulting in injury.  Her anticoagulation has now been stopped.  She  walks with a walker.  She is working with PT and has had no recent falls.      Plan:  1. I will stop her lisinopril due to concerns for overcontrolled blood pressure with diuresis.  2. I will start 20 mg Lasix daily.  She takes 20 mEq of potassium chloride daily; I will have her take an extra dose every other day.  3. I advised her to leave her compression stockings off until the blisters on her legs heal.  4. I will see her back in 1 week.  May consider increase in beta-blocker if heart rate is elevated.      Follow Up:  No follow-ups on file.  Orders Placed This Encounter   Procedures   • ECG 12 Lead      New Medications Ordered This Visit   Medications   • furosemide (LASIX) 20 MG tablet     Sig: Take 1 tablet by mouth Daily.     Dispense:  30 tablet     Refill:  11   • metoprolol tartrate (LOPRESSOR) 25 MG tablet     Sig: Take 1 tablet by mouth 2 (Two) Times a Day. 1/2 tab am and pm     Dispense:  60 tablet     Refill:  11         Thank you the opportunity to participate in this patient's care.    DUSTIN Murillo    This office note has been dictated.

## 2022-08-17 NOTE — TELEPHONE ENCOUNTER
Added her to MM schedule today at 2:30 pm, canceled appt with Ludy Bartholomew.    Bonnie Boogie RN  [facility] Triage  08/17/22 09:08 EDT

## 2022-08-17 NOTE — TELEPHONE ENCOUNTER
I could add her to 2:30 on my schedule if you'd rather me see her, Ludy. She's an independent 89-year old lady who lives home alone. I had stopped her amlodipine at last visit for edema. No worries either way!  Larissa

## 2022-08-19 ENCOUNTER — OFFICE VISIT (OUTPATIENT)
Dept: FAMILY MEDICINE CLINIC | Facility: CLINIC | Age: 87
End: 2022-08-19

## 2022-08-19 VITALS
OXYGEN SATURATION: 94 % | HEIGHT: 63 IN | BODY MASS INDEX: 17.89 KG/M2 | DIASTOLIC BLOOD PRESSURE: 74 MMHG | HEART RATE: 75 BPM | SYSTOLIC BLOOD PRESSURE: 122 MMHG | WEIGHT: 101 LBS

## 2022-08-19 DIAGNOSIS — E53.8 B12 DEFICIENCY: ICD-10-CM

## 2022-08-19 DIAGNOSIS — R60.0 LOCALIZED EDEMA: ICD-10-CM

## 2022-08-19 DIAGNOSIS — E87.6 HYPOKALEMIA: ICD-10-CM

## 2022-08-19 DIAGNOSIS — R33.9 URINARY RETENTION: ICD-10-CM

## 2022-08-19 DIAGNOSIS — I48.21 PERMANENT ATRIAL FIBRILLATION: ICD-10-CM

## 2022-08-19 DIAGNOSIS — I34.0 NONRHEUMATIC MITRAL VALVE REGURGITATION: ICD-10-CM

## 2022-08-19 DIAGNOSIS — E03.9 ACQUIRED HYPOTHYROIDISM: Primary | ICD-10-CM

## 2022-08-19 DIAGNOSIS — I10 PRIMARY HYPERTENSION: ICD-10-CM

## 2022-08-19 PROCEDURE — 99204 OFFICE O/P NEW MOD 45 MIN: CPT | Performed by: FAMILY MEDICINE

## 2022-08-19 PROCEDURE — 36415 COLL VENOUS BLD VENIPUNCTURE: CPT | Performed by: FAMILY MEDICINE

## 2022-08-19 RX ORDER — POTASSIUM CHLORIDE 750 MG/1
CAPSULE, EXTENDED RELEASE ORAL
COMMUNITY
Start: 2022-07-11 | End: 2022-08-19

## 2022-08-19 RX ORDER — BIOTIN 1 MG
1000 TABLET ORAL
COMMUNITY
Start: 2022-05-24

## 2022-08-19 RX ORDER — LEVOTHYROXINE SODIUM 0.03 MG/1
25 TABLET ORAL DAILY
Qty: 90 TABLET | Refills: 3 | Status: SHIPPED | OUTPATIENT
Start: 2022-08-19 | End: 2023-04-04 | Stop reason: SDUPTHER

## 2022-08-19 RX ORDER — POTASSIUM CHLORIDE 1500 MG/1
TABLET, FILM COATED, EXTENDED RELEASE ORAL
COMMUNITY
Start: 2022-07-10 | End: 2022-08-19 | Stop reason: SDUPTHER

## 2022-08-19 RX ORDER — LANOLIN ALCOHOL/MO/W.PET/CERES
1000 CREAM (GRAM) TOPICAL DAILY
Qty: 90 TABLET | Refills: 1 | Status: SHIPPED | OUTPATIENT
Start: 2022-08-19 | End: 2023-04-04 | Stop reason: SDUPTHER

## 2022-08-19 RX ORDER — TAMSULOSIN HYDROCHLORIDE 0.4 MG/1
1 CAPSULE ORAL DAILY
Qty: 90 CAPSULE | Refills: 1 | Status: SHIPPED | OUTPATIENT
Start: 2022-08-19 | End: 2023-02-13

## 2022-08-19 RX ORDER — LANOLIN ALCOHOL/MO/W.PET/CERES
1000 CREAM (GRAM) TOPICAL
COMMUNITY
Start: 2022-04-03 | End: 2022-08-19 | Stop reason: SDUPTHER

## 2022-08-19 RX ORDER — BRIMONIDINE TARTRATE 0.1 %
1 DROPS OPHTHALMIC (EYE)
COMMUNITY
Start: 2022-04-29 | End: 2022-08-19

## 2022-08-19 RX ORDER — POTASSIUM CHLORIDE 1500 MG/1
20 TABLET, FILM COATED, EXTENDED RELEASE ORAL 2 TIMES DAILY
Qty: 180 TABLET | Refills: 1 | Status: SHIPPED | OUTPATIENT
Start: 2022-08-19 | End: 2023-02-06 | Stop reason: SDUPTHER

## 2022-08-19 RX ORDER — TAMSULOSIN HYDROCHLORIDE 0.4 MG/1
CAPSULE ORAL
COMMUNITY
Start: 2022-07-10 | End: 2022-08-19 | Stop reason: SDUPTHER

## 2022-08-19 NOTE — PROGRESS NOTES
"Chief Complaint  Hypothyroidism, Leg Swelling, Aortic Insufficiency, Atrial Fibrillation, and Diarrhea (CHRONIC)    Subjective          Keyana Nguyen presents to [facility]  Comes in today needs establish care she said some problems with edema she was taken off amlodipine put on Lasix continues to come potassium she says she has not had her thyroid checked in some period of time she says otherwise she is doing pretty good has some chronic shortness of breath and edema and varicosities she says she needs her new medications and continues following with her cardiologist      Objective   Vital Signs:   /74   Pulse 75   Ht 160 cm (63\")   Wt 45.8 kg (101 lb)   SpO2 94%   BMI 17.89 kg/m²     Body mass index is 17.89 kg/m².    Review of Systems   Constitutional: Positive for fatigue.   HENT: Negative for congestion, dental problem, ear discharge, ear pain and sore throat.    Respiratory: Positive for shortness of breath. Negative for apnea and chest tightness.    Gastrointestinal: Positive for diarrhea. Negative for constipation and nausea.   Endocrine: Negative for polyuria.   Genitourinary: Negative for difficulty urinating.   Musculoskeletal: Negative for arthralgias and gait problem.   Skin: Negative for rash.   Hematological: Negative for adenopathy.       Past History:  Medical History: has a past medical history of Aortic regurgitation, Atrial fibrillation (HCC), Breast cancer (HCC), Essential hypertension, Fatigue, Herpes zoster with complication (10/3/2016), Hypothyroidism, Irritable bowel syndrome, Mitral valve insufficiency, Nonrheumatic aortic (valve) insufficiency, Osteopenia of the elderly, Postmenopausal status, Vitamin B12 deficiency, and Vitamin D deficiency.   Surgical History: has a past surgical history that includes Breast surgery; Total knee arthroplasty; orthopedic surgery; tonsillectomy and adenoidectomy; Hysterectomy; Oophorectomy; Breast biopsy; and " Mastectomy.         Current Outpatient Medications:   •  Acetaminophen (TYLENOL EXTRA STRENGTH PO), Take 1 tablet by mouth Daily., Disp: , Rfl:   •  bimatoprost (LUMIGAN) 0.01 % ophthalmic drops, Administer 1 drop to both eyes Every Night., Disp: , Rfl:   •  Biotin 1000 MCG tablet, 1,000 mcg., Disp: , Rfl:   •  Calcium Carbonate 1500 (600 Ca) MG tablet, 600 mg., Disp: , Rfl:   •  Cholecalciferol (Vitamin D3) 25 MCG (1000 UT) capsule, 25 mcg., Disp: , Rfl:   •  furosemide (LASIX) 20 MG tablet, Take 1 tablet by mouth Daily., Disp: 30 tablet, Rfl: 11  •  levothyroxine (SYNTHROID, LEVOTHROID) 25 MCG tablet, Take 1 tablet by mouth Daily., Disp: 90 tablet, Rfl: 3  •  metoprolol tartrate (LOPRESSOR) 25 MG tablet, Take 1 tablet by mouth 2 (Two) Times a Day. 1/2 tab am and pm, Disp: 60 tablet, Rfl: 11  •  potassium chloride ER (K-TAB) 20 MEQ tablet controlled-release ER tablet, Take 1 tablet by mouth 2 (Two) Times a Day., Disp: 180 tablet, Rfl: 1  •  tamsulosin (FLOMAX) 0.4 MG capsule 24 hr capsule, Take 1 capsule by mouth Daily., Disp: 90 capsule, Rfl: 1  •  vitamin B-12 (CYANOCOBALAMIN) 1000 MCG tablet, Take 1 tablet by mouth Daily., Disp: 90 tablet, Rfl: 1  •  brimonidine (Alphagan P) 0.1 % solution ophthalmic solution, Alphagan P 0.1 % eye drops, Disp: , Rfl:   •  calcium carbonate (OS-KAMAR) 600 MG tablet, Take 600 mg by mouth Daily., Disp: , Rfl:   •  Cholecalciferol (VITAMIN D) 1000 UNITS tablet, Take 1 tablet by mouth daily., Disp: , Rfl:   •  Multiple Vitamins-Minerals (CENTRUM SILVER) tablet, Take 1 tablet by mouth daily., Disp: , Rfl:     Allergies: Patient has no known allergies.    Physical Exam  Vitals reviewed.   Constitutional:       Appearance: Normal appearance.   HENT:      Head: Normocephalic and atraumatic.      Right Ear: Tympanic membrane, ear canal and external ear normal.      Left Ear: Tympanic membrane, ear canal and external ear normal.      Nose: Nose normal.      Mouth/Throat:      Mouth: Mucous  membranes are moist.   Eyes:      Extraocular Movements: Extraocular movements intact.      Conjunctiva/sclera: Conjunctivae normal.      Pupils: Pupils are equal, round, and reactive to light.   Cardiovascular:      Rate and Rhythm: Normal rate. Rhythm irregularly irregular.      Heart sounds: Murmur heard.    Systolic murmur is present with a grade of 2/6.  Pulmonary:      Effort: Pulmonary effort is normal.      Breath sounds: Normal breath sounds.   Abdominal:      General: Abdomen is flat. Bowel sounds are normal.      Palpations: Abdomen is soft.   Musculoskeletal:         General: Normal range of motion.   Feet:      Right foot:      Protective Sensation: 0 sites tested. 0 sites sensed.      Toenail Condition: Right toenails are normal.      Left foot:      Protective Sensation: 0 sites tested. 0 sites sensed.      Toenail Condition: Left toenails are normal.   Skin:     General: Skin is warm and dry.   Neurological:      General: No focal deficit present.      Mental Status: She is alert and oriented to person, place, and time. Mental status is at baseline.   Psychiatric:         Behavior: Behavior normal.         Thought Content: Thought content normal.         Judgment: Judgment normal.          Result Review :                   Assessment and Plan    Diagnoses and all orders for this visit:    1. Acquired hypothyroidism (Primary)  Comments:  We will get TSH level and monitor and continue meds  Orders:  -     levothyroxine (SYNTHROID, LEVOTHROID) 25 MCG tablet; Take 1 tablet by mouth Daily.  Dispense: 90 tablet; Refill: 3  -     TSH; Future  -     TSH    2. Nonrheumatic mitral valve regurgitation  Comments:  Seeing cardiology     3. B12 deficiency  Comments:  Replacing and will get blood work  Orders:  -     vitamin B-12 (CYANOCOBALAMIN) 1000 MCG tablet; Take 1 tablet by mouth Daily.  Dispense: 90 tablet; Refill: 1  -     Vitamin B12; Future  -     Vitamin B12    4. Permanent atrial fibrillation  ([facility])  Comments:  Stable    5. Urinary retention  Comments:  Continues on her Flomax and monitor  Orders:  -     tamsulosin (FLOMAX) 0.4 MG capsule 24 hr capsule; Take 1 capsule by mouth Daily.  Dispense: 90 capsule; Refill: 1    6. Hypokalemia  Comments:  Replacing and get level  Orders:  -     potassium chloride ER (K-TAB) 20 MEQ tablet controlled-release ER tablet; Take 1 tablet by mouth 2 (Two) Times a Day.  Dispense: 180 tablet; Refill: 1    7. Localized edema  Comments:  Appears improved elevate legs and try to restart the pressure stockings    8. Primary hypertension  Comments:  Stable  Orders:  -     CBC & Differential; Future  -     Comprehensive Metabolic Panel; Future  -     CBC & Differential  -     Comprehensive Metabolic Panel              Follow Up   No follow-ups on file.  Patient was given instructions and counseling regarding her condition or for health maintenance advice. Please see specific information pulled into the AVS if appropriate.     Alcides Schmidt MD

## 2022-08-20 LAB
ALBUMIN SERPL-MCNC: 3.7 G/DL (ref 3.5–4.6)
ALBUMIN/GLOB SERPL: 1.5 {RATIO} (ref 1.2–2.2)
ALP SERPL-CCNC: 91 IU/L (ref 44–121)
ALT SERPL-CCNC: 16 IU/L (ref 0–32)
AST SERPL-CCNC: 18 IU/L (ref 0–40)
BASOPHILS # BLD AUTO: 0 X10E3/UL (ref 0–0.2)
BASOPHILS NFR BLD AUTO: 0 %
BILIRUB SERPL-MCNC: 0.4 MG/DL (ref 0–1.2)
BUN SERPL-MCNC: 8 MG/DL (ref 10–36)
BUN/CREAT SERPL: 12 (ref 12–28)
CALCIUM SERPL-MCNC: 8.4 MG/DL (ref 8.7–10.3)
CHLORIDE SERPL-SCNC: 101 MMOL/L (ref 96–106)
CO2 SERPL-SCNC: 28 MMOL/L (ref 20–29)
CREAT SERPL-MCNC: 0.69 MG/DL (ref 0.57–1)
EGFRCR-CYS SERPLBLD CKD-EPI 2021: 79 ML/MIN/1.73
EOSINOPHIL # BLD AUTO: 0 X10E3/UL (ref 0–0.4)
EOSINOPHIL NFR BLD AUTO: 0 %
ERYTHROCYTE [DISTWIDTH] IN BLOOD BY AUTOMATED COUNT: 15.7 % (ref 11.7–15.4)
GLOBULIN SER CALC-MCNC: 2.5 G/DL (ref 1.5–4.5)
GLUCOSE SERPL-MCNC: 79 MG/DL (ref 65–99)
HCT VFR BLD AUTO: 31 % (ref 34–46.6)
HGB BLD-MCNC: 9.2 G/DL (ref 11.1–15.9)
IMM GRANULOCYTES # BLD AUTO: 0 X10E3/UL (ref 0–0.1)
IMM GRANULOCYTES NFR BLD AUTO: 0 %
LYMPHOCYTES # BLD AUTO: 1.7 X10E3/UL (ref 0.7–3.1)
LYMPHOCYTES NFR BLD AUTO: 18 %
MCH RBC QN AUTO: 22.4 PG (ref 26.6–33)
MCHC RBC AUTO-ENTMCNC: 29.7 G/DL (ref 31.5–35.7)
MCV RBC AUTO: 75 FL (ref 79–97)
MONOCYTES # BLD AUTO: 1.1 X10E3/UL (ref 0.1–0.9)
MONOCYTES NFR BLD AUTO: 12 %
NEUTROPHILS # BLD AUTO: 6.4 X10E3/UL (ref 1.4–7)
NEUTROPHILS NFR BLD AUTO: 70 %
PLATELET # BLD AUTO: 384 X10E3/UL (ref 150–450)
POTASSIUM SERPL-SCNC: 3.2 MMOL/L (ref 3.5–5.2)
PROT SERPL-MCNC: 6.2 G/DL (ref 6–8.5)
RBC # BLD AUTO: 4.11 X10E6/UL (ref 3.77–5.28)
SODIUM SERPL-SCNC: 142 MMOL/L (ref 134–144)
TSH SERPL DL<=0.005 MIU/L-ACNC: 1.18 UIU/ML (ref 0.45–4.5)
VIT B12 SERPL-MCNC: 956 PG/ML (ref 232–1245)
WBC # BLD AUTO: 9.2 X10E3/UL (ref 3.4–10.8)

## 2022-08-25 ENCOUNTER — OFFICE VISIT (OUTPATIENT)
Dept: CARDIOLOGY | Facility: CLINIC | Age: 87
End: 2022-08-25

## 2022-08-25 VITALS
SYSTOLIC BLOOD PRESSURE: 120 MMHG | DIASTOLIC BLOOD PRESSURE: 64 MMHG | WEIGHT: 97 LBS | HEART RATE: 71 BPM | HEIGHT: 63 IN | OXYGEN SATURATION: 97 % | BODY MASS INDEX: 17.19 KG/M2

## 2022-08-25 DIAGNOSIS — I36.1 NONRHEUMATIC TRICUSPID VALVE REGURGITATION: ICD-10-CM

## 2022-08-25 DIAGNOSIS — M79.89 LEG SWELLING: Primary | ICD-10-CM

## 2022-08-25 DIAGNOSIS — I10 ESSENTIAL HYPERTENSION: ICD-10-CM

## 2022-08-25 DIAGNOSIS — I35.1 NONRHEUMATIC AORTIC VALVE INSUFFICIENCY: ICD-10-CM

## 2022-08-25 DIAGNOSIS — R29.6 FALLS FREQUENTLY: ICD-10-CM

## 2022-08-25 DIAGNOSIS — I48.21 PERMANENT ATRIAL FIBRILLATION: ICD-10-CM

## 2022-08-25 DIAGNOSIS — E03.9 ACQUIRED HYPOTHYROIDISM: ICD-10-CM

## 2022-08-25 DIAGNOSIS — I34.0 NONRHEUMATIC MITRAL VALVE REGURGITATION: ICD-10-CM

## 2022-08-25 PROCEDURE — 99213 OFFICE O/P EST LOW 20 MIN: CPT | Performed by: NURSE PRACTITIONER

## 2022-08-25 PROCEDURE — 93000 ELECTROCARDIOGRAM COMPLETE: CPT | Performed by: NURSE PRACTITIONER

## 2022-08-25 RX ORDER — FUROSEMIDE 20 MG/1
40 TABLET ORAL DAILY
Qty: 60 TABLET | Refills: 11 | Status: SHIPPED | OUTPATIENT
Start: 2022-08-25 | End: 2023-02-01 | Stop reason: SDUPTHER

## 2022-08-25 NOTE — PROGRESS NOTES
[facility] CARDIOLOGY  [address]  [facility] [address]  [facility] [address]  Phone: [phone]      Patient Name: Keyana Nguyen  :1925  Age: 89 y.o.  Primary Cardiologist: Kylah Jones MD  Encounter Provider:  DUSTIN Douglass      Chief Complaint     Chief Complaint: Follow-up, Atrial Fibrillation, and Hypertension  Leg swelling    SUBJECTIVE     History of Present Illness:  Keyana Nguyen is a 89 y.o.  female whose medical history includes hypertension, hypothyroidism, and history of right breast cancer.  She is followed in our office by Dr. Jones for atrial fibrillation, aortic insufficiency, mitral insufficiency, tricuspid insufficiency.     22 Follow-up:  She is here for follow-up of leg swelling.  There has been no improvement in leg swelling with the addition to 20 mg Lasix daily.  She does not feel her urine output has increased and her breathing feels a little labored when laying in bed at night.  Her weight is down a few pounds compared to previous but also, her appetite remains just fair.  She is having diarrhea and this has been a chronic issue.  Blood pressure is still 120s after stopping lisinopril.  Primary care physician started her on oral iron for anemia.    Below is a summary of pertinent cardiology findings:  • Previously followed with Dr. Buenrostro at [facility] in [place].  • History of PACs.  • 2014 carotid artery duplex: Less than 40% right internal carotid artery stenosis and 20 to 40% left internal carotid artery stenosis.  • 3/23/2015 echocardiogram: Moderate to severe aortic insufficiency, moderate to severe mitral insufficiency, aortic valvular sclerosis without stenosis, concentric left ventricular hypertrophy, EF 63%, RVSP 48 mmHg.  Were significant changes compared to echocardiogram in 2014.  • 2015 Holter monitor: Underlying atrial fibrillation with average heart rate 83 bpm and no  "significant pauses.  This was a new diagnosis.  • March 2016 echocardiogram: EF 62%, moderate left atrial enlargement, mild to moderate mitral insufficiency, mild aortic insufficiency, mild to moderate tricuspid insufficiency.    Past Medical History:   Diagnosis Date   • Aortic regurgitation    • Atrial fibrillation (HCC)    • Breast cancer (HCC)    • Essential hypertension    • Fatigue    • Herpes zoster with complication 10/3/2016   • Hypothyroidism    • Irritable bowel syndrome    • Mitral valve insufficiency    • Nonrheumatic aortic (valve) insufficiency    • Osteopenia of the elderly    • Postmenopausal status    • Vitamin B12 deficiency    • Vitamin D deficiency        Past Surgical History:  · Right mastectomy  · hysterectomy with oophorectomy  · total knee arthroplasty  · Tonsillectomy and adenoidectomy    Social History     Socioeconomic History   • Marital status:    Tobacco Use   • Smoking status: Never Smoker   • Smokeless tobacco: Never Used   Substance and Sexual Activity   • Alcohol use: No     Comment: caffeine use: 6 cups daily    • Drug use: No   • Sexual activity: Never         Review of Systems     Review of Systems   Cardiovascular: Positive for dyspnea on exertion and leg swelling. Negative for chest pain, irregular heartbeat, near-syncope, orthopnea and syncope.   Musculoskeletal: Positive for falls.   Gastrointestinal: Positive for diarrhea.       Medications     Allergies as of 08/25/2022   • (No Known Allergies)       Acetaminophen, Biotin, Calcium Carbonate, Vitamin D3, bimatoprost, brimonidine, calcium carbonate, furosemide, levothyroxine, metoprolol tartrate, multivitamin with minerals, potassium chloride ER, tamsulosin, and vitamin B-12       OBJECTIVE     Vital Signs:   /64   Pulse 71   Ht 160 cm (63\")   Wt 44 kg (97 lb)   SpO2 97%   BMI 17.18 kg/m²       Weight:  Wt Readings from Last 3 Encounters:   08/25/22 44 kg (97 lb)   08/19/22 45.8 kg (101 lb)   08/17/22 45.8 " kg (101 lb)     Body mass index is 17.18 kg/m².      Physical Exam     Physical Exam  Constitutional:       General: She is not in acute distress.  HENT:      Head: Normocephalic and atraumatic.      Mouth/Throat:      Mouth: Mucous membranes are moist.   Eyes:      General: No scleral icterus.     Extraocular Movements: Extraocular movements intact.      Conjunctiva/sclera: Conjunctivae normal.      Pupils: Pupils are equal, round, and reactive to light.   Cardiovascular:      Rate and Rhythm: Normal rate. Rhythm irregularly irregular.      Pulses: Normal pulses.      Heart sounds: S1 normal and S2 normal.      Comments: L>R leg swelling. Bruising to left lateral calf after injury from wheelchair. No redness, warmth, or weeping.   Pulmonary:      Effort: No respiratory distress.      Breath sounds: Normal breath sounds. No wheezing, rhonchi or rales.   Abdominal:      General: Bowel sounds are normal. There is no distension.      Palpations: Abdomen is soft.      Tenderness: There is no abdominal tenderness.   Musculoskeletal:         General: Normal range of motion.      Cervical back: Normal range of motion and neck supple.      Right lower le+ Pitting Edema present.      Left lower le+ Pitting Edema present.   Skin:     General: Skin is warm and dry.      Coloration: Skin is not jaundiced.   Neurological:      Mental Status: She is alert and oriented to person, place, and time.   Psychiatric:         Mood and Affect: Mood normal.         Reviewed Data     Result Review :  The following data was reviewed by DUSTIN Douglass on 22:  • Labs 2022:  cr 0.7, K 3.2, otherwise unremarkable CMP, Hgb 9.2, Plt 384, TSH 1.180    • Labs 2022:  cr 0.6, K 4.4, otherwise unremarkable CMP, Hgb 9.8, Plt 105  • Labs 2022:  TSH 2.01  • Labs 2022:  cr 0.7, K 3.0, otherwise unremarkable CMP, Hgb 9.2, Plt 278      ECG 12 Lead    Date/Time: 2022 3:56 PM  Performed by: Niecy  DUSTIN Rizzo  Authorized by: Mechelle Pemberton, DUSTIN   Comparison: compared with previous ECG from 7/19/2022  Similar to previous ECG  Rhythm: atrial fibrillation  Rate: normal  BPM: 75    Clinical impression: abnormal EKG            Assessment and Plan        Assessment and Plan     Assessment:  1. Leg swelling    2. Permanent atrial fibrillation (HCC)    3. Nonrheumatic aortic valve insufficiency    4. Nonrheumatic mitral valve regurgitation    5. Nonrheumatic tricuspid valve regurgitation    6. Essential hypertension    7. Acquired hypothyroidism    8. Falls frequently         1. Leg swelling: Initially better after stopping amlodipine, however this is gotten worse and there has been no improvement with the addition of 20 mg Lasix.  2. Atrial fibrillation: This is permanent and rate controlled.  Her anticoagulation was stopped during hospitalization due to frequent falls.  Her heart rate is controlled on 12.5 mg metoprolol tartrate twice daily.  3. Aortic insufficiency: Noted on echocardiogram in March 2015 and again on echo and March 2016; graded as mild in 2016.  I suspect she has some diastolic heart failure with peripheral volume excess.  Her lungs are clear on exam.  4. Mitral insufficiency: Noted on echocardiogram from March 2015 and again echo on March 2016; graded as mild to moderate.  Same as #3  5. Tricuspid insufficiency: Noted on echocardiogram from March 2015 and again on echo in March 2016; graded as mild to moderate.  Same as #3.  6. Hypertension: BP better off amlodipine and lisinopril.  7. Hypothyroidism she is treated with levothyroxine and TSH was within range when checked in this 2022; levothyroxine dose is the same.  8. Frequent falls; she had 2 falls over the past year resulting in injury.  Her anticoagulation has now been stopped.  She walks with a walker.  She no longer qualifies for in-home PT.      Plan:  1. Increase furosemide to 40 mg daily and increase potassium  chloride to 20 mEq twice daily.  Her lungs are clear so I am a little reluctant to increase Lasix.  However the skin on her legs does feel quite tight.  Hopefully Lasix can be reduced once swelling is improved.  2. I will call her next week to see how her leg swelling is doing.  3. She will keep her October 2022 appointment with me.      Follow Up:  No follow-ups on file.  Orders Placed This Encounter   Procedures   • ECG 12 Lead      New Medications Ordered This Visit   Medications   • metoprolol tartrate (LOPRESSOR) 25 MG tablet     Sig: Take 0.5 tablets by mouth 2 (Two) Times a Day.     Dispense:  30 tablet     Refill:  11   • furosemide (LASIX) 20 MG tablet     Sig: Take 2 tablets by mouth Daily.     Dispense:  60 tablet     Refill:  11         Thank you the opportunity to participate in this patient's care.    DUSTIN Murillo    This office note has been dictated.

## 2022-09-01 ENCOUNTER — TELEPHONE (OUTPATIENT)
Dept: CARDIOLOGY | Facility: CLINIC | Age: 87
End: 2022-09-01

## 2022-09-02 NOTE — TELEPHONE ENCOUNTER
No changes in medicine. Would keep legs elevated when able. She might try some compression socks instead of stockings; they may be easier to get on. If she has trouble wearing them, we will stick with elevated and current medications.     Thanks, Bonnie!  Larissa

## 2022-09-02 NOTE — TELEPHONE ENCOUNTER
Called and spoke with patient's daughter. She is going to try compression hose again.  I let her know we do not want to change any medications at this time.  I asked her to call us if her mom was to develop an increase in swelling or SOA.  I asked her to follow up with PCP next week if nausea continues.  She verbalizes understanding.      Bonnie Boogie RN  [facility] Triage  09/02/22 10:52 EDT

## 2022-09-02 NOTE — TELEPHONE ENCOUNTER
"Called mobile number which is her daughter, Loraine.  Loraine is allowed information based on ANTONIO form.  Loraine is not currently with her mother.  She did let me know her leg swelling is somewhat better, although not completely gone.  She think her mom's BP have been good, but doesn't have any numbers.  Loraine said I can call her mother.  Sometimes she has a few memory issues, but should be able to give me an update on herself.    I called Keyana Nguyen, the patient, at home.  She also reports her leg swelling seems a little better.  Currently she says her legs look really good, but as the day does on they tend to swell more.  She elevates them during the day.  She denies SOA.    Her daughter helps her take her BP usually.  She hasn't checked it in a while.  She has a wrist cuff handy and volunteered to take BP while I was on the phone with her.  /71 HR 92.     Only issue she has been having is some nausea for about a week.  She stated taking an over the counter \"fruit and veggies\" vitamin to try to help her chronic diarrhea issue.  She takes the vitamin in the afternoon but decided to skip it yesterday and she said she hasn't had any nausea since then.  She is going to stop taking the vitamins over the weekend and see if that makes the nausea go away.  She denies abd pain, blood in stool, vomiting.  I encouraged her to call her PCP next week if she is still having nausea.  She agrees with that plan.     If there is anything else I can do for this patient please let me know.    Bonnie Boogie RN  [facility] Triage  09/02/22 09:34 EDT    "

## 2022-09-14 ENCOUNTER — TELEPHONE (OUTPATIENT)
Dept: FAMILY MEDICINE CLINIC | Facility: CLINIC | Age: 87
End: 2022-09-14

## 2022-09-29 ENCOUNTER — TELEPHONE (OUTPATIENT)
Dept: FAMILY MEDICINE CLINIC | Facility: CLINIC | Age: 87
End: 2022-09-29

## 2022-09-29 NOTE — TELEPHONE ENCOUNTER
FRANDY PATIENT  Vickie from [facility] called for verbal order for wound care right knee. Cleaning and dressing, patient will do this daily by her self, unable to steri strip because it is still bleeding. VERBAL GIVEN. Order will be faxed for siqnature.

## 2022-10-03 ENCOUNTER — TELEPHONE (OUTPATIENT)
Dept: FAMILY MEDICINE CLINIC | Facility: CLINIC | Age: 87
End: 2022-10-03

## 2022-10-03 ENCOUNTER — OFFICE VISIT (OUTPATIENT)
Dept: FAMILY MEDICINE CLINIC | Facility: CLINIC | Age: 87
End: 2022-10-03

## 2022-10-03 VITALS
HEIGHT: 63 IN | BODY MASS INDEX: 15.06 KG/M2 | DIASTOLIC BLOOD PRESSURE: 75 MMHG | WEIGHT: 85 LBS | OXYGEN SATURATION: 97 % | HEART RATE: 68 BPM | SYSTOLIC BLOOD PRESSURE: 115 MMHG

## 2022-10-03 DIAGNOSIS — E03.9 ACQUIRED HYPOTHYROIDISM: Primary | ICD-10-CM

## 2022-10-03 DIAGNOSIS — I10 PRIMARY HYPERTENSION: ICD-10-CM

## 2022-10-03 DIAGNOSIS — D64.9 ANEMIA, UNSPECIFIED TYPE: ICD-10-CM

## 2022-10-03 DIAGNOSIS — S81.812A NONINFECTED SKIN TEAR OF LOWER EXTREMITY, LEFT, INITIAL ENCOUNTER: ICD-10-CM

## 2022-10-03 DIAGNOSIS — E53.8 B12 DEFICIENCY: ICD-10-CM

## 2022-10-03 DIAGNOSIS — K21.9 GASTROESOPHAGEAL REFLUX DISEASE WITHOUT ESOPHAGITIS: ICD-10-CM

## 2022-10-03 DIAGNOSIS — E87.6 HYPOKALEMIA: ICD-10-CM

## 2022-10-03 PROCEDURE — 99213 OFFICE O/P EST LOW 20 MIN: CPT | Performed by: FAMILY MEDICINE

## 2022-10-03 PROCEDURE — 36415 COLL VENOUS BLD VENIPUNCTURE: CPT | Performed by: FAMILY MEDICINE

## 2022-10-03 RX ORDER — FAMOTIDINE 40 MG/1
40 TABLET, FILM COATED ORAL NIGHTLY
Qty: 90 TABLET | Refills: 1 | Status: SHIPPED | OUTPATIENT
Start: 2022-10-03 | End: 2023-03-20

## 2022-10-03 RX ORDER — FAMOTIDINE 40 MG/1
40 TABLET, FILM COATED ORAL NIGHTLY
Qty: 90 TABLET | Refills: 1 | Status: SHIPPED | OUTPATIENT
Start: 2022-10-03 | End: 2022-10-03 | Stop reason: SDUPTHER

## 2022-10-03 RX ORDER — FERROUS SULFATE 325(65) MG
325 TABLET ORAL
COMMUNITY

## 2022-10-03 NOTE — PROGRESS NOTES
"Chief Complaint  Heartburn and Abrasion (On right leg)    Subjective          Keyana Nguyen presents to [facility] PRIMARY CARE  History of Present Illness  Patient comes in today needing her medications refilled her blood work redone she says she is doing okay denies any activities problems at this time she did fall and brace her right knee here recently for about a week she has been using bandages on it      Objective   Vital Signs:   /75   Pulse 68   Ht 160 cm (62.99\")   Wt 38.6 kg (85 lb)   SpO2 97%   BMI 15.06 kg/m²     Body mass index is 15.06 kg/m².    Review of Systems   Constitutional: Positive for fatigue.   HENT: Negative for congestion, dental problem, ear discharge, ear pain and sore throat.    Respiratory: Negative for apnea, chest tightness and shortness of breath.    Gastrointestinal: Negative for constipation and nausea.   Endocrine: Negative for polyuria.   Genitourinary: Negative for difficulty urinating.   Musculoskeletal: Negative for arthralgias and gait problem.   Skin: Positive for wound. Negative for rash.   Hematological: Negative for adenopathy.       Past History:  Medical History: has a past medical history of Aortic regurgitation, Atrial fibrillation (HCC), Breast cancer (HCC), Essential hypertension, Fatigue, Herpes zoster with complication (10/3/2016), Hypothyroidism, Irritable bowel syndrome, Mitral valve insufficiency, Nonrheumatic aortic (valve) insufficiency, Osteopenia of the elderly, Postmenopausal status, Vitamin B12 deficiency, and Vitamin D deficiency.   Surgical History: has a past surgical history that includes Breast surgery; Total knee arthroplasty; orthopedic surgery; tonsillectomy and adenoidectomy; Hysterectomy; Oophorectomy; Breast biopsy; and Mastectomy.         Current Outpatient Medications:   •  Acetaminophen (TYLENOL EXTRA STRENGTH PO), Take 1 tablet by mouth Daily., Disp: , Rfl:   •  bimatoprost (LUMIGAN) 0.01 % ophthalmic drops, " Administer 1 drop to both eyes Every Night., Disp: , Rfl:   •  Biotin 1000 MCG tablet, 1,000 mcg., Disp: , Rfl:   •  calcium carbonate (OS-KAMAR) 600 MG tablet, Take 600 mg by mouth Daily., Disp: , Rfl:   •  Calcium Carbonate 1500 (600 Ca) MG tablet, 600 mg., Disp: , Rfl:   •  Cholecalciferol (Vitamin D3) 25 MCG (1000 UT) capsule, 25 mcg., Disp: , Rfl:   •  ferrous sulfate 325 (65 FE) MG tablet, Take 325 mg by mouth Daily With Breakfast., Disp: , Rfl:   •  furosemide (LASIX) 20 MG tablet, Take 2 tablets by mouth Daily., Disp: 60 tablet, Rfl: 11  •  levothyroxine (SYNTHROID, LEVOTHROID) 25 MCG tablet, Take 1 tablet by mouth Daily., Disp: 90 tablet, Rfl: 3  •  metoprolol tartrate (LOPRESSOR) 25 MG tablet, Take 0.5 tablets by mouth 2 (Two) Times a Day., Disp: 30 tablet, Rfl: 11  •  Multiple Vitamins-Minerals (CENTRUM SILVER) tablet, Take 1 tablet by mouth daily., Disp: , Rfl:   •  potassium chloride ER (K-TAB) 20 MEQ tablet controlled-release ER tablet, Take 1 tablet by mouth 2 (Two) Times a Day., Disp: 180 tablet, Rfl: 1  •  tamsulosin (FLOMAX) 0.4 MG capsule 24 hr capsule, Take 1 capsule by mouth Daily., Disp: 90 capsule, Rfl: 1  •  vitamin B-12 (CYANOCOBALAMIN) 1000 MCG tablet, Take 1 tablet by mouth Daily., Disp: 90 tablet, Rfl: 1    Allergies: Patient has no known allergies.    Physical Exam  Vitals reviewed.   Constitutional:       Appearance: Normal appearance.   HENT:      Head: Normocephalic and atraumatic.      Right Ear: Tympanic membrane, ear canal and external ear normal.      Left Ear: Tympanic membrane, ear canal and external ear normal.      Nose: Nose normal.      Mouth/Throat:      Mouth: Mucous membranes are moist.   Eyes:      Extraocular Movements: Extraocular movements intact.      Conjunctiva/sclera: Conjunctivae normal.      Pupils: Pupils are equal, round, and reactive to light.   Cardiovascular:      Rate and Rhythm: Normal rate and regular rhythm.   Pulmonary:      Effort: Pulmonary effort is  normal.      Breath sounds: Normal breath sounds.   Abdominal:      General: Abdomen is flat. Bowel sounds are normal.      Palpations: Abdomen is soft.   Musculoskeletal:         General: Normal range of motion.   Feet:      Right foot:      Protective Sensation: 0 sites tested. 0 sites sensed.      Toenail Condition: Right toenails are normal.      Left foot:      Protective Sensation: 0 sites tested. 0 sites sensed.      Toenail Condition: Left toenails are normal.   Skin:     General: Skin is warm and dry.      Comments: 3 cm skin tear right knee   Neurological:      General: No focal deficit present.      Mental Status: She is alert and oriented to person, place, and time. Mental status is at baseline.   Psychiatric:         Behavior: Behavior normal.         Thought Content: Thought content normal.         Judgment: Judgment normal.          Result Review :                   Assessment and Plan    Diagnoses and all orders for this visit:    1. Acquired hypothyroidism (Primary)  Comments:  Get TSH and monitor  Orders:  -     TSH; Future    2. Hypokalemia  Comments:  Replacing today mostly your levels appear to be    3. Primary hypertension  Comments:  Continue medications  Orders:  -     Comprehensive Metabolic Panel; Future    4. Anemia, unspecified type  Comments:  Last hemoglobin was 9.6 we will monitor and get blood work  Orders:  -     CBC & Differential; Future  -     Iron; Future  -     Ferritin; Future    5. B12 deficiency  Comments:  Replacing p.o.  Orders:  -     Vitamin B12; Future    6. Noninfected skin tear of lower extremity, left, initial encounter  Comments:  Continue bandaging changing it daily appears to be healing              Follow Up   Return in about 3 months (around 1/3/2023).  Patient was given instructions and counseling regarding her condition or for health maintenance advice. Please see specific information pulled into the AVS if appropriate.     Alcides Schmidt MD

## 2022-10-04 LAB
ALBUMIN SERPL-MCNC: 4 G/DL (ref 3.5–4.6)
ALBUMIN/GLOB SERPL: 1.5 {RATIO} (ref 1.2–2.2)
ALP SERPL-CCNC: 100 IU/L (ref 44–121)
ALT SERPL-CCNC: 23 IU/L (ref 0–32)
AST SERPL-CCNC: 24 IU/L (ref 0–40)
BILIRUB SERPL-MCNC: 0.4 MG/DL (ref 0–1.2)
BUN SERPL-MCNC: 20 MG/DL (ref 10–36)
BUN/CREAT SERPL: 22 (ref 12–28)
CALCIUM SERPL-MCNC: 9.4 MG/DL (ref 8.7–10.3)
CHLORIDE SERPL-SCNC: 108 MMOL/L (ref 96–106)
CO2 SERPL-SCNC: 19 MMOL/L (ref 20–29)
CREAT SERPL-MCNC: 0.92 MG/DL (ref 0.57–1)
EGFRCR SERPLBLD CKD-EPI 2021: 57 ML/MIN/1.73
FERRITIN SERPL-MCNC: 71 NG/ML (ref 15–150)
GLOBULIN SER CALC-MCNC: 2.7 G/DL (ref 1.5–4.5)
GLUCOSE SERPL-MCNC: 84 MG/DL (ref 70–99)
IRON SERPL-MCNC: 73 UG/DL (ref 27–139)
POTASSIUM SERPL-SCNC: 4 MMOL/L (ref 3.5–5.2)
PROT SERPL-MCNC: 6.7 G/DL (ref 6–8.5)
SODIUM SERPL-SCNC: 146 MMOL/L (ref 134–144)
TSH SERPL DL<=0.005 MIU/L-ACNC: 0.98 UIU/ML (ref 0.45–4.5)
VIT B12 SERPL-MCNC: 1059 PG/ML (ref 232–1245)

## 2022-10-11 LAB
BASOPHILS # BLD AUTO: 0 X10E3/UL (ref 0–0.2)
BASOPHILS NFR BLD AUTO: 1 %
EOSINOPHIL # BLD AUTO: 0.1 X10E3/UL (ref 0–0.4)
EOSINOPHIL NFR BLD AUTO: 1 %
ERYTHROCYTE [DISTWIDTH] IN BLOOD BY AUTOMATED COUNT: 21.8 % (ref 11.7–15.4)
HCT VFR BLD AUTO: 42.3 % (ref 34–46.6)
HGB BLD-MCNC: 13.2 G/DL (ref 11.1–15.9)
IMM GRANULOCYTES # BLD AUTO: 0 X10E3/UL (ref 0–0.1)
IMM GRANULOCYTES NFR BLD AUTO: 0 %
LYMPHOCYTES # BLD AUTO: 2.3 X10E3/UL (ref 0.7–3.1)
LYMPHOCYTES NFR BLD AUTO: 29 %
MCH RBC QN AUTO: 24.2 PG (ref 26.6–33)
MCHC RBC AUTO-ENTMCNC: 31.2 G/DL (ref 31.5–35.7)
MCV RBC AUTO: 78 FL (ref 79–97)
MONOCYTES # BLD AUTO: 0.7 X10E3/UL (ref 0.1–0.9)
MONOCYTES NFR BLD AUTO: 9 %
NEUTROPHILS # BLD AUTO: 4.7 X10E3/UL (ref 1.4–7)
NEUTROPHILS NFR BLD AUTO: 60 %
PLATELET # BLD AUTO: 239 X10E3/UL (ref 150–450)
RBC # BLD AUTO: 5.45 X10E6/UL (ref 3.77–5.28)
WBC # BLD AUTO: 7.8 X10E3/UL (ref 3.4–10.8)

## 2022-10-20 ENCOUNTER — OFFICE VISIT (OUTPATIENT)
Dept: CARDIOLOGY | Facility: CLINIC | Age: 87
End: 2022-10-20

## 2022-10-20 VITALS
OXYGEN SATURATION: 97 % | SYSTOLIC BLOOD PRESSURE: 112 MMHG | BODY MASS INDEX: 15.64 KG/M2 | DIASTOLIC BLOOD PRESSURE: 62 MMHG | HEART RATE: 105 BPM | WEIGHT: 85 LBS | HEIGHT: 62 IN

## 2022-10-20 DIAGNOSIS — E03.9 ACQUIRED HYPOTHYROIDISM: ICD-10-CM

## 2022-10-20 DIAGNOSIS — I34.0 NONRHEUMATIC MITRAL VALVE REGURGITATION: ICD-10-CM

## 2022-10-20 DIAGNOSIS — I10 ESSENTIAL HYPERTENSION: ICD-10-CM

## 2022-10-20 DIAGNOSIS — I35.1 NONRHEUMATIC AORTIC VALVE INSUFFICIENCY: ICD-10-CM

## 2022-10-20 DIAGNOSIS — M79.89 LEG SWELLING: Primary | ICD-10-CM

## 2022-10-20 DIAGNOSIS — I36.1 NONRHEUMATIC TRICUSPID VALVE REGURGITATION: ICD-10-CM

## 2022-10-20 DIAGNOSIS — I48.21 PERMANENT ATRIAL FIBRILLATION: ICD-10-CM

## 2022-10-20 PROCEDURE — 99213 OFFICE O/P EST LOW 20 MIN: CPT | Performed by: NURSE PRACTITIONER

## 2022-10-20 PROCEDURE — 93000 ELECTROCARDIOGRAM COMPLETE: CPT | Performed by: NURSE PRACTITIONER

## 2022-10-20 NOTE — PROGRESS NOTES
[facility] CARDIOLOGY  [address]  [facility] [address]  [facility] [address]  Phone: [phone]      Patient Name: Keyana Nguyen  :[id number]  Age: 89 y.o.  Primary Cardiologist: Kylah Jones MD  Encounter Provider:  DUSTIN Douglass      Chief Complaint     Chief Complaint: Atrial Fibrillation  Leg swelling    SUBJECTIVE     History of Present Illness:  Keyana Nguyen is a 89 y.o.  female whose medical history includes hypertension, hypothyroidism, and history of right breast cancer.  She is followed in our office by Dr. Jones for atrial fibrillation, aortic insufficiency, mitral insufficiency, tricuspid insufficiency.     10/20/22 Follow-up:  She is here for follow-up of leg swelling.  Her legs are finally back to normal but her weight is only 85 pounds.  She states she is eating well but does not drink very much.  She does not feel that the 40 mg of Lasix daily is too high of a dose.  Her heart rate is better controlled.  She has been started on 40 mg Pepcid daily and this is helped some epigastric pain she was having but she feels it causes her to wake up at night; she also describes some hallucinations when she wakes up at night.  She did fall out of bed and scraped her knee but was able to get up and get back in bed without difficulty.  She denies chest pain, dyspnea with exertion, orthopnea, or lightheadedness.    Below is a summary of pertinent cardiology findings:  • Previously followed with Dr. Buenrostro at [place] [facility] [place].  • History of PACs.  • 2014 carotid artery duplex: Less than 40% right internal carotid artery stenosis and 20 to 40% left internal carotid artery stenosis.  • 3/23/2015 echocardiogram: Moderate to severe aortic insufficiency, moderate to severe mitral insufficiency, aortic valvular sclerosis without stenosis, concentric left ventricular hypertrophy, EF 63%, RVSP 48 mmHg.  Were significant changes  compared to echocardiogram in October 2014.  • 6/30/2015 Holter monitor: Underlying atrial fibrillation with average heart rate 83 bpm and no significant pauses.  This was a new diagnosis.  • March 2016 echocardiogram: EF 62%, moderate left atrial enlargement, mild to moderate mitral insufficiency, mild aortic insufficiency, mild to moderate tricuspid insufficiency.    Past Medical History:   Diagnosis Date   • Aortic regurgitation    • Atrial fibrillation (HCC)    • Breast cancer (HCC)    • Essential hypertension    • Fatigue    • Herpes zoster with complication 10/3/2016   • Hypothyroidism    • Irritable bowel syndrome    • Mitral valve insufficiency    • Nonrheumatic aortic (valve) insufficiency    • Osteopenia of the elderly    • Postmenopausal status    • Vitamin B12 deficiency    • Vitamin D deficiency        Past Surgical History:  · Right mastectomy  · hysterectomy with oophorectomy  · total knee arthroplasty  · Tonsillectomy and adenoidectomy    Social History     Socioeconomic History   • Marital status:    Tobacco Use   • Smoking status: Never   • Smokeless tobacco: Never   Substance and Sexual Activity   • Alcohol use: No     Comment: caffeine use: 6 cups daily    • Drug use: No   • Sexual activity: Never         Review of Systems     Review of Systems   Cardiovascular: Negative for chest pain, dyspnea on exertion, irregular heartbeat, leg swelling, near-syncope, orthopnea and syncope.   Musculoskeletal: Positive for falls.   Gastrointestinal: Negative for diarrhea.   Psychiatric/Behavioral: Positive for hallucinations.       Medications     Allergies as of 10/20/2022   • (No Known Allergies)       Acetaminophen, Biotin, Calcium Carbonate, Vitamin D3, bimatoprost, calcium carbonate, famotidine, ferrous sulfate, furosemide, levothyroxine, metoprolol tartrate, multivitamin with minerals, potassium chloride ER, tamsulosin, and vitamin B-12       OBJECTIVE     Vital Signs:   /62   Pulse 105    "Ht 157.5 cm (62\")   Wt 38.6 kg (85 lb)   SpO2 97%   BMI 15.55 kg/m²       Weight:  Wt Readings from Last 3 Encounters:   10/20/22 38.6 kg (85 lb)   10/03/22 38.6 kg (85 lb)   08/25/22 44 kg (97 lb)     Body mass index is 15.55 kg/m².      Physical Exam     Physical Exam  Constitutional:       General: She is not in acute distress.  HENT:      Head: Normocephalic and atraumatic.      Mouth/Throat:      Mouth: Mucous membranes are moist.   Eyes:      General: No scleral icterus.     Extraocular Movements: Extraocular movements intact.      Conjunctiva/sclera: Conjunctivae normal.      Pupils: Pupils are equal, round, and reactive to light.   Cardiovascular:      Rate and Rhythm: Normal rate. Rhythm irregularly irregular.      Pulses: Normal pulses.      Heart sounds: S1 normal and S2 normal.   Pulmonary:      Effort: No respiratory distress.      Breath sounds: Normal breath sounds. No wheezing, rhonchi or rales.   Abdominal:      General: Bowel sounds are normal. There is no distension.      Palpations: Abdomen is soft.      Tenderness: There is no abdominal tenderness.   Musculoskeletal:         General: Normal range of motion.      Cervical back: Normal range of motion and neck supple.      Right lower leg: No edema.      Left lower leg: No edema.   Skin:     General: Skin is warm and dry.      Coloration: Skin is not jaundiced.   Neurological:      Mental Status: She is alert and oriented to person, place, and time.   Psychiatric:         Mood and Affect: Mood normal.         Reviewed Data     Result Review :  The following data was reviewed by DUSTIN Douglass on 10/20/22:  • Labs 10/03/2022:  cr 0.9, K 4.0, , otherwise unremarkable CMP, TSH 0.977, Hgb 13.2, Plt 239  • Labs 08/19/2022:  cr 0.7, K 3.2, otherwise unremarkable CMP, Hgb 9.2, Plt 384, TSH 1.180    • Labs 06.20.2022:  cr 0.6, K 4.4, otherwise unremarkable CMP, Hgb 9.8, Plt 105  • Labs 06.08.2022:  TSH 2.01  • Labs 05/23/2022:  " cr 0.7, K 3.0, otherwise unremarkable CMP, Hgb 9.2, Plt 278      ECG 12 Lead    Date/Time: 10/20/2022 1:21 PM  Performed by: Mechelle Pemberton APRN  Authorized by: Mechelle Pemberton APRN   Comparison: compared with previous ECG from 8/25/2022  Similar to previous ECG  Rhythm: atrial fibrillation  Rate: normal  BPM: 81  ST Segments: ST segments normal    Clinical impression: abnormal EKG            Assessment and Plan        Assessment and Plan     Assessment:  1. Leg swelling    2. Permanent atrial fibrillation (HCC)    3. Nonrheumatic aortic valve insufficiency    4. Nonrheumatic mitral valve regurgitation    5. Nonrheumatic tricuspid valve regurgitation    6. Essential hypertension    7. Acquired hypothyroidism         1. Leg swelling: This has resolved on 40 mg Lasix daily.  Her weight is down to 85 pounds.  2. Atrial fibrillation: This is permanent and rate controlled.  Her anticoagulation was stopped during hospitalization due to frequent falls.  Her heart rate is controlled on 12.5 mg metoprolol tartrate twice daily.  3. Aortic insufficiency: Noted on echocardiogram in March 2015 and again on echo and March 2016; graded as mild in 2016.  I suspect she has some diastolic heart failure with peripheral volume excess.  Preceded by exam today.  4. Mitral insufficiency: Noted on echocardiogram from March 2015 and again echo on March 2016; graded as mild to moderate.  No signs of heart failure on exam today.  5. Tricuspid insufficiency: Noted on echocardiogram from March 2015 and again on echo in March 2016; graded as mild to moderate.  Same as #3.  6. Hypertension: BP better off amlodipine and lisinopril.  She denies any orthostatic symptoms.  7. Hypothyroidism she is treated with levothyroxine and TSH was within range when checked in this 2022; levothyroxine dose is the same.  8. Frequent falls; she had 2 falls over the past year resulting in injury.  Her anticoagulation has now been stopped.   She walks with a walker.  She has had no falls though she did fall out of bed which she thinks was due to a bad dream.  She had no difficulty getting herself up and back in bed.  9. Hallucinations: She feels this is due to the Pepcid which she takes at night.      Plan:  1. I made no medication changes today.  I did encourage her to only take 20 mg of Lasix on days that she does not eat or drink well.  2. We discussed that she did look dry on her most recent labs; I encouraged her to drink more water.  3. We discussed that she should try cutting the Pepcid in half to see if this improves her hallucinations when she wakes up at night.  4. She will follow-up with Dr. Jones in January 2023.      Follow Up:  No follow-ups on file.  Orders Placed This Encounter   Procedures   • ECG 12 Lead      No orders of the defined types were placed in this encounter.        Thank you the opportunity to participate in this patient's care.    DUTSIN Murillo    This office note has been dictated.

## 2023-01-03 ENCOUNTER — TELEPHONE (OUTPATIENT)
Dept: FAMILY MEDICINE CLINIC | Facility: CLINIC | Age: 88
End: 2023-01-03

## 2023-01-03 ENCOUNTER — OFFICE VISIT (OUTPATIENT)
Dept: FAMILY MEDICINE CLINIC | Facility: CLINIC | Age: 88
End: 2023-01-03
Payer: MEDICARE

## 2023-01-03 VITALS
HEART RATE: 78 BPM | DIASTOLIC BLOOD PRESSURE: 78 MMHG | SYSTOLIC BLOOD PRESSURE: 122 MMHG | HEIGHT: 62 IN | BODY MASS INDEX: 16.71 KG/M2 | TEMPERATURE: 97.3 F | WEIGHT: 90.8 LBS | OXYGEN SATURATION: 97 %

## 2023-01-03 DIAGNOSIS — G89.29 CHRONIC BILATERAL LOW BACK PAIN WITHOUT SCIATICA: ICD-10-CM

## 2023-01-03 DIAGNOSIS — L72.3 SEBACEOUS CYST: ICD-10-CM

## 2023-01-03 DIAGNOSIS — D64.9 ANEMIA, UNSPECIFIED TYPE: ICD-10-CM

## 2023-01-03 DIAGNOSIS — K59.1 FUNCTIONAL DIARRHEA: ICD-10-CM

## 2023-01-03 DIAGNOSIS — I48.21 PERMANENT ATRIAL FIBRILLATION: ICD-10-CM

## 2023-01-03 DIAGNOSIS — I10 PRIMARY HYPERTENSION: ICD-10-CM

## 2023-01-03 DIAGNOSIS — E03.9 ACQUIRED HYPOTHYROIDISM: Primary | ICD-10-CM

## 2023-01-03 DIAGNOSIS — M54.50 CHRONIC BILATERAL LOW BACK PAIN WITHOUT SCIATICA: ICD-10-CM

## 2023-01-03 DIAGNOSIS — R60.0 LOCALIZED EDEMA: ICD-10-CM

## 2023-01-03 PROCEDURE — 1160F RVW MEDS BY RX/DR IN RCRD: CPT | Performed by: FAMILY MEDICINE

## 2023-01-03 PROCEDURE — 1159F MED LIST DOCD IN RCRD: CPT | Performed by: FAMILY MEDICINE

## 2023-01-03 PROCEDURE — 99214 OFFICE O/P EST MOD 30 MIN: CPT | Performed by: FAMILY MEDICINE

## 2023-01-03 RX ORDER — DIPHENOXYLATE HYDROCHLORIDE AND ATROPINE SULFATE 2.5; .025 MG/1; MG/1
1 TABLET ORAL 4 TIMES DAILY PRN
Qty: 30 TABLET | Refills: 2 | Status: SHIPPED | OUTPATIENT
Start: 2023-01-03 | End: 2023-01-03 | Stop reason: SDUPTHER

## 2023-01-03 RX ORDER — CRANBERRY FRUIT EXTRACT 200 MG
500 CAPSULE ORAL DAILY
COMMUNITY

## 2023-01-03 RX ORDER — DIPHENOXYLATE HYDROCHLORIDE AND ATROPINE SULFATE 2.5; .025 MG/1; MG/1
1 TABLET ORAL DAILY
Qty: 30 TABLET | Refills: 2
Start: 2023-01-03 | End: 2023-03-07 | Stop reason: SDUPTHER

## 2023-01-03 NOTE — TELEPHONE ENCOUNTER
HER DAUGHTER CALLED AND HAS QUESTIONS ABOUT THE DIRECTIONS OF THE DIPHENOXYLATE-ATROPINE. JEANNIE CAN BE REACHED AT  [phone]

## 2023-01-03 NOTE — PROGRESS NOTES
Chief Complaint  Leg Swelling (Follow up ) and Back Pain (PT states that she has continuous back pain when standing for short periods of time )    Subjective          Keyana Nguyen presents to [facility] PRIMARY CARE  History of Present Illness  Patient comes in to recheck her medical problems she has been seeing cardiology they have reduced her Lasix from 40-20 she says she continues to do well with her atrial FaBB they have taken her off her blood thinners now because of risk of fall has had more problems with some low back pain recently which she has had chronically and has had evaluation about 7 years ago which was told that there was little that could be done except basically supportive care.  She says she continues with her diarrhea she did get extensive work-up she has been using Lomotil in the past but none recently she says her diarrhea recently has gotten worse she also reports she has a nodule on her right wrist that is basically she has been picking it and now its gotten a little bit irritated she reports      Objective   Vital Signs:   /78 (BP Location: Left arm, Patient Position: Sitting, Cuff Size: Adult)   Pulse 78   Temp 97.3 °F (36.3 °C) (Infrared)   Ht 157.5 cm (62.01\")   Wt 41.2 kg (90 lb 12.8 oz)   SpO2 97% Comment: resting room air  BMI 16.60 kg/m²     Body mass index is 16.6 kg/m².    Review of Systems   Constitutional: Positive for fatigue.   HENT: Negative for congestion, dental problem, ear discharge, ear pain and sore throat.    Respiratory: Negative for apnea, chest tightness and shortness of breath.    Gastrointestinal: Positive for diarrhea. Negative for constipation and nausea.   Endocrine: Negative for polyuria.   Genitourinary: Negative for difficulty urinating.   Musculoskeletal: Positive for back pain. Negative for arthralgias and gait problem.   Skin: Negative for rash.   Hematological: Negative for adenopathy.       Past History:  Medical History:  has a past medical history of Aortic regurgitation, Atrial fibrillation (HCC), Breast cancer (HCC), Essential hypertension, Fatigue, Herpes zoster with complication (10/3/2016), Hypothyroidism, Irritable bowel syndrome, Mitral valve insufficiency, Nonrheumatic aortic (valve) insufficiency, Osteopenia of the elderly, Postmenopausal status, Vitamin B12 deficiency, and Vitamin D deficiency.   Surgical History: has a past surgical history that includes Breast surgery; Total knee arthroplasty; orthopedic surgery; tonsillectomy and adenoidectomy; Hysterectomy; Oophorectomy; Breast biopsy; and Mastectomy.         Current Outpatient Medications:   •  Acetaminophen (TYLENOL EXTRA STRENGTH PO), Take 1 tablet by mouth Daily., Disp: , Rfl:   •  Biotin 1000 MCG tablet, 1,000 mcg., Disp: , Rfl:   •  calcium carbonate (OS-KAMAR) 600 MG tablet, Take 600 mg by mouth Daily., Disp: , Rfl:   •  Cholecalciferol (Vitamin D3) 25 MCG (1000 UT) capsule, 25 mcg., Disp: , Rfl:   •  Cranberry 200 MG capsule, Take 500 mg by mouth Daily., Disp: , Rfl:   •  famotidine (Pepcid) 40 MG tablet, Take 1 tablet by mouth Every Night., Disp: 90 tablet, Rfl: 1  •  ferrous sulfate 325 (65 FE) MG tablet, Take 325 mg by mouth Daily With Breakfast., Disp: , Rfl:   •  furosemide (LASIX) 20 MG tablet, Take 2 tablets by mouth Daily., Disp: 60 tablet, Rfl: 11  •  levothyroxine (SYNTHROID, LEVOTHROID) 25 MCG tablet, Take 1 tablet by mouth Daily., Disp: 90 tablet, Rfl: 3  •  metoprolol tartrate (LOPRESSOR) 25 MG tablet, Take 0.5 tablets by mouth 2 (Two) Times a Day., Disp: 30 tablet, Rfl: 11  •  Multiple Vitamins-Minerals (CENTRUM SILVER) tablet, Take 1 tablet by mouth daily., Disp: , Rfl:   •  potassium chloride ER (K-TAB) 20 MEQ tablet controlled-release ER tablet, Take 1 tablet by mouth 2 (Two) Times a Day., Disp: 180 tablet, Rfl: 1  •  tamsulosin (FLOMAX) 0.4 MG capsule 24 hr capsule, Take 1 capsule by mouth Daily., Disp: 90 capsule, Rfl: 1  •  vitamin B-12  (CYANOCOBALAMIN) 1000 MCG tablet, Take 1 tablet by mouth Daily., Disp: 90 tablet, Rfl: 1  •  bimatoprost (LUMIGAN) 0.01 % ophthalmic drops, Administer 1 drop to both eyes Every Night., Disp: , Rfl:   •  Calcium Carbonate 1500 (600 Ca) MG tablet, 600 mg., Disp: , Rfl:   •  diphenoxylate-atropine (Lomotil) 2.5-0.025 MG per tablet, Take 1 tablet by mouth 4 (Four) Times a Day As Needed for Diarrhea., Disp: 30 tablet, Rfl: 2    Allergies: Patient has no known allergies.    Physical Exam  Vitals reviewed.   Constitutional:       Appearance: Normal appearance.   HENT:      Head: Normocephalic and atraumatic.      Right Ear: Tympanic membrane, ear canal and external ear normal.      Left Ear: Tympanic membrane, ear canal and external ear normal.      Nose: Nose normal.      Mouth/Throat:      Mouth: Mucous membranes are moist.   Eyes:      Extraocular Movements: Extraocular movements intact.      Conjunctiva/sclera: Conjunctivae normal.      Pupils: Pupils are equal, round, and reactive to light.   Cardiovascular:      Rate and Rhythm: Normal rate and regular rhythm.   Pulmonary:      Effort: Pulmonary effort is normal.      Breath sounds: Normal breath sounds.   Abdominal:      General: Abdomen is flat. Bowel sounds are normal.      Palpations: Abdomen is soft.   Musculoskeletal:         General: Normal range of motion.      Comments: In his lumbar spine   Feet:      Right foot:      Protective Sensation: 0 sites tested. 0 sites sensed.      Toenail Condition: Right toenails are normal.      Left foot:      Protective Sensation: 0 sites tested. 0 sites sensed.      Toenail Condition: Left toenails are normal.   Skin:     General: Skin is warm and dry.      Comments: Draining sebaceous cyst on right forearm   Neurological:      General: No focal deficit present.      Mental Status: She is alert and oriented to person, place, and time. Mental status is at baseline.   Psychiatric:         Behavior: Behavior normal.          Thought Content: Thought content normal.         Judgment: Judgment normal.          Result Review :                   Assessment and Plan    Diagnoses and all orders for this visit:    1. Acquired hypothyroidism (Primary)  Comments:  Continue medications recent TSH was good    2. Primary hypertension  Comments:  Stable    3. Permanent atrial fibrillation (HCC)  Comments:  Off blood thinners rate appears to be controlled    4. Localized edema  Comments:  Much improved continue Lasix 20 daily    5. Anemia, unspecified type  Comments:  Stable    6. Chronic bilateral low back pain without sciatica  Comments:  Reviewed old x-rays and previous treatment we will do Tylenol and monitor    7. Sebaceous cyst  Comments:  With some mild pressure assist was removed and bandage applied; monitor infections are worsening    8. Functional diarrhea  Comments:  We will try Lomotil daily  Orders:  -     diphenoxylate-atropine (Lomotil) 2.5-0.025 MG per tablet; Take 1 tablet by mouth 4 (Four) Times a Day As Needed for Diarrhea.  Dispense: 30 tablet; Refill: 2              Follow Up   No follow-ups on file.  Patient was given instructions and counseling regarding her condition or for health maintenance advice. Please see specific information pulled into the AVS if appropriate.     Alcides Schmidt MD

## 2023-01-17 ENCOUNTER — TELEPHONE (OUTPATIENT)
Dept: FAMILY MEDICINE CLINIC | Facility: CLINIC | Age: 88
End: 2023-01-17

## 2023-01-17 NOTE — TELEPHONE ENCOUNTER
Caller: Jeannie Olivares    Relationship to patient: Emergency Contact    Best call back number: [phone]    Patient is needing: JEANNIE STATED THAT [facility] HAS NOT RECEIVED ANY MEDICATIONS ON PATIENT AND WOULD LIKE TO SEE ABOUT GETTING ALL HER MEDICATIONS SENT TO THEM TODAY PLEASE    PLEASE ADVISE

## 2023-02-01 DIAGNOSIS — E03.9 ACQUIRED HYPOTHYROIDISM: ICD-10-CM

## 2023-02-01 DIAGNOSIS — E87.6 HYPOKALEMIA: ICD-10-CM

## 2023-02-01 DIAGNOSIS — R33.9 URINARY RETENTION: ICD-10-CM

## 2023-02-01 DIAGNOSIS — K59.1 FUNCTIONAL DIARRHEA: ICD-10-CM

## 2023-02-01 NOTE — TELEPHONE ENCOUNTER
Caller: JEANNIE    Relationship: DAUGHTER    Best call back number: [phone]    Requested Prescriptions:   Requested Prescriptions     Pending Prescriptions Disp Refills   • metoprolol tartrate (LOPRESSOR) 25 MG tablet 30 tablet 11     Sig: Take 0.5 tablets by mouth 2 (Two) Times a Day.   • furosemide (LASIX) 20 MG tablet 60 tablet 11     Sig: Take 2 tablets by mouth Daily.        Pharmacy where request should be sent: [facility]) - [place], [place] - [address] [facility] [phone] [facility] [phone] FX     Additional details provided by patient:     PT HAS STARTED TO WEEN OFF THE TWO TABLETS A DAY OF THE FUROSEMIDE.  SHE IS BACK TO THE ONE PILL A DAY AND EVERYTHING IS FINE.  SHOULD THEY MAINTAIN THE ONE PILL A DAY?    Does the patient have less than a 3 day supply:  [] Yes  [x] No    Would you like a call back once the refill request has been completed: [] Yes [] No    If the office needs to give you a call back, can they leave a voicemail: [] Yes [] No    Cruz Fernando Rep   02/01/23 15:58 EST

## 2023-02-01 NOTE — TELEPHONE ENCOUNTER
Caller: Loraine Olivares    Relationship: Emergency Contact    Best call back number: [phone]    Requested Prescriptions:   Requested Prescriptions     Pending Prescriptions Disp Refills   • potassium chloride ER (K-TAB) 20 MEQ tablet controlled-release ER tablet 180 tablet 1     Sig: Take 1 tablet by mouth 2 (Two) Times a Day.   • tamsulosin (FLOMAX) 0.4 MG capsule 24 hr capsule 90 capsule 1     Sig: Take 1 capsule by mouth Daily.   • levothyroxine (SYNTHROID, LEVOTHROID) 25 MCG tablet 90 tablet 3     Sig: Take 1 tablet by mouth Daily.   • famotidine (Pepcid) 40 MG tablet 90 tablet 1     Sig: Take 1 tablet by mouth Every Night.   • diphenoxylate-atropine (Lomotil) 2.5-0.025 MG per tablet 30 tablet 2     Sig: Take 1 tablet by mouth Daily.      Pharmacy where request should be sent: [facility] [place] [address] [phone] [facility] [phone] FX     Additional details provided by patient:     CALLER ADVISED SHE IS NOT SURE ON THE PATIENT'S SUPPLY OF MEDICATION.    Does the patient have less than a 3 day supply:  [x] Yes  [] No    Would you like a call back once the refill request has been completed: [] Yes [x] No    If the office needs to give you a call back, can they leave a voicemail: [] Yes [x] No    Cruz Parada Rep   02/01/23 16:32 EST

## 2023-02-02 RX ORDER — LEVOTHYROXINE SODIUM 0.03 MG/1
25 TABLET ORAL DAILY
Qty: 90 TABLET | Refills: 3 | OUTPATIENT
Start: 2023-02-02

## 2023-02-02 RX ORDER — POTASSIUM CHLORIDE 1500 MG/1
20 TABLET, FILM COATED, EXTENDED RELEASE ORAL 2 TIMES DAILY
Qty: 180 TABLET | Refills: 1 | OUTPATIENT
Start: 2023-02-02

## 2023-02-02 RX ORDER — TAMSULOSIN HYDROCHLORIDE 0.4 MG/1
1 CAPSULE ORAL DAILY
Qty: 90 CAPSULE | Refills: 1 | OUTPATIENT
Start: 2023-02-02

## 2023-02-02 RX ORDER — DIPHENOXYLATE HYDROCHLORIDE AND ATROPINE SULFATE 2.5; .025 MG/1; MG/1
1 TABLET ORAL DAILY
Qty: 30 TABLET | Refills: 2
Start: 2023-02-02

## 2023-02-02 RX ORDER — FUROSEMIDE 20 MG/1
40 TABLET ORAL DAILY
Qty: 60 TABLET | Refills: 11 | Status: SHIPPED | OUTPATIENT
Start: 2023-02-02

## 2023-02-02 RX ORDER — FAMOTIDINE 40 MG/1
40 TABLET, FILM COATED ORAL NIGHTLY
Qty: 90 TABLET | Refills: 1 | OUTPATIENT
Start: 2023-02-02

## 2023-02-02 NOTE — TELEPHONE ENCOUNTER
Failed protocol    NOV-04/04/23-RM  LOV-10/2022-MM    Plan:  1. I made no medication changes today.  I did encourage her to only take 20 mg of Lasix on days that she does not eat or drink well.  2. We discussed that she did look dry on her most recent labs; I encouraged her to drink more water.  3. We discussed that she should try cutting the Pepcid in half to see if this improves her hallucinations when she wakes up at night.  4. She will follow-up with Dr. Jones in January 2023.        Follow Up:  No follow-ups on file.

## 2023-02-06 ENCOUNTER — TELEPHONE (OUTPATIENT)
Dept: CARDIOLOGY | Facility: CLINIC | Age: 88
End: 2023-02-06

## 2023-02-06 DIAGNOSIS — E87.6 HYPOKALEMIA: ICD-10-CM

## 2023-02-06 RX ORDER — POTASSIUM CHLORIDE 1500 MG/1
20 TABLET, EXTENDED RELEASE ORAL 2 TIMES DAILY
Qty: 180 TABLET | Refills: 1 | Status: SHIPPED | OUTPATIENT
Start: 2023-02-06 | End: 2023-04-04 | Stop reason: SDUPTHER

## 2023-02-06 NOTE — TELEPHONE ENCOUNTER
Caller: Loraine Olivares    Relationship to patient: Emergency Contact    Best call back number: [phone]    Patient is needing: PATIENT'S DAUGHTER REQUESTING THAT THE OFFICE TAKE OVER THE PRESCRIPTION FOR POTASSIUM CHLORIDE AND IF THAT COULD BE SENT INTO TO [facility].

## 2023-02-06 NOTE — TELEPHONE ENCOUNTER
Potassium Chloride sent to [facility] as requested.    Returned call to Loraine and notified the prescription has been sent in.  Loraine verbalized understanding.    Thank you,  Jennifer BARON RN  Triage Nurse G

## 2023-02-10 DIAGNOSIS — R33.9 URINARY RETENTION: ICD-10-CM

## 2023-02-13 RX ORDER — TAMSULOSIN HYDROCHLORIDE 0.4 MG/1
CAPSULE ORAL
Qty: 90 CAPSULE | Refills: 1 | Status: SHIPPED | OUTPATIENT
Start: 2023-02-13 | End: 2023-04-04 | Stop reason: SDUPTHER

## 2023-03-07 DIAGNOSIS — K59.1 FUNCTIONAL DIARRHEA: ICD-10-CM

## 2023-03-07 RX ORDER — DIPHENOXYLATE HYDROCHLORIDE AND ATROPINE SULFATE 2.5; .025 MG/1; MG/1
1 TABLET ORAL DAILY
Qty: 30 TABLET | Refills: 2
Start: 2023-03-07 | End: 2023-03-16 | Stop reason: SDUPTHER

## 2023-03-13 DIAGNOSIS — K59.1 FUNCTIONAL DIARRHEA: ICD-10-CM

## 2023-03-13 RX ORDER — DIPHENOXYLATE HYDROCHLORIDE AND ATROPINE SULFATE 2.5; .025 MG/1; MG/1
1 TABLET ORAL DAILY
Qty: 30 TABLET | Refills: 2 | Status: CANCELLED
Start: 2023-03-13

## 2023-03-13 NOTE — TELEPHONE ENCOUNTER
Caller: Loraine Olivares    Relationship: Emergency Contact    Best call back number: [phone]    Requested Prescriptions:   Requested Prescriptions     Pending Prescriptions Disp Refills   • diphenoxylate-atropine (Lomotil) 2.5-0.025 MG per tablet 30 tablet 2     Sig: Take 1 tablet by mouth Daily.        Pharmacy where request should be sent: [facility]ARMACY #[id number] - [facility] [address] [phone]  - [phone] FX     Additional details provided by patient:     Does the patient have less than a 3 day supply:  [x] Yes  [] No    SiCruz Zaragoza Rep   03/13/23 10:47 EDT

## 2023-03-14 NOTE — TELEPHONE ENCOUNTER
PATIENTS DAUGHTER CALLED BACK TO CHECK ON THIS MEDICATION. SHE SAID THIS MEDICATION HAS REALLY HELPED HER AND WILL BE OUT OF IT SOON. PLEASE CALL IT IN SO SHE CAN PICK IT UP TOMORROW ON 3/15/23 AT HER PHARMACY.  THANK YOU.

## 2023-03-15 ENCOUNTER — TELEPHONE (OUTPATIENT)
Dept: FAMILY MEDICINE CLINIC | Facility: CLINIC | Age: 88
End: 2023-03-15
Payer: MEDICARE

## 2023-03-15 NOTE — TELEPHONE ENCOUNTER
FRANDY PATIENT   pt daughter called and said pt will run out of Lamotil a week before her appt. Pt daughter would like for it to be sent in today please.

## 2023-03-16 DIAGNOSIS — K59.1 FUNCTIONAL DIARRHEA: ICD-10-CM

## 2023-03-16 RX ORDER — DIPHENOXYLATE HYDROCHLORIDE AND ATROPINE SULFATE 2.5; .025 MG/1; MG/1
1 TABLET ORAL DAILY
Qty: 30 TABLET | Refills: 2 | Status: SHIPPED | OUTPATIENT
Start: 2023-03-16 | End: 2023-04-04 | Stop reason: SDUPTHER

## 2023-03-16 RX ORDER — DIPHENOXYLATE HYDROCHLORIDE AND ATROPINE SULFATE 2.5; .025 MG/1; MG/1
1 TABLET ORAL DAILY
Qty: 30 TABLET | Refills: 2
Start: 2023-03-16 | End: 2023-03-16 | Stop reason: SDUPTHER

## 2023-03-20 RX ORDER — FAMOTIDINE 40 MG/1
TABLET, FILM COATED ORAL
Qty: 90 TABLET | Refills: 3 | Status: SHIPPED | OUTPATIENT
Start: 2023-03-20 | End: 2023-04-04 | Stop reason: SDUPTHER

## 2023-04-04 ENCOUNTER — OFFICE VISIT (OUTPATIENT)
Dept: FAMILY MEDICINE CLINIC | Facility: CLINIC | Age: 88
End: 2023-04-04
Payer: MEDICARE

## 2023-04-04 VITALS
DIASTOLIC BLOOD PRESSURE: 68 MMHG | SYSTOLIC BLOOD PRESSURE: 120 MMHG | HEIGHT: 62 IN | HEART RATE: 60 BPM | BODY MASS INDEX: 17.54 KG/M2 | OXYGEN SATURATION: 98 % | WEIGHT: 95.3 LBS

## 2023-04-04 DIAGNOSIS — R33.9 URINARY RETENTION: ICD-10-CM

## 2023-04-04 DIAGNOSIS — E53.8 B12 DEFICIENCY: ICD-10-CM

## 2023-04-04 DIAGNOSIS — K59.1 FUNCTIONAL DIARRHEA: ICD-10-CM

## 2023-04-04 DIAGNOSIS — I10 PRIMARY HYPERTENSION: ICD-10-CM

## 2023-04-04 DIAGNOSIS — E87.6 HYPOKALEMIA: ICD-10-CM

## 2023-04-04 DIAGNOSIS — E03.9 ACQUIRED HYPOTHYROIDISM: Primary | ICD-10-CM

## 2023-04-04 DIAGNOSIS — I48.21 PERMANENT ATRIAL FIBRILLATION: ICD-10-CM

## 2023-04-04 RX ORDER — MULTIPLE VITAMINS W/ MINERALS TAB 9MG-400MCG
1 TAB ORAL DAILY
COMMUNITY

## 2023-04-04 RX ORDER — TAMSULOSIN HYDROCHLORIDE 0.4 MG/1
1 CAPSULE ORAL DAILY
Qty: 90 CAPSULE | Refills: 1 | Status: SHIPPED | OUTPATIENT
Start: 2023-04-04

## 2023-04-04 RX ORDER — DIPHENOXYLATE HYDROCHLORIDE AND ATROPINE SULFATE 2.5; .025 MG/1; MG/1
1 TABLET ORAL DAILY
Qty: 90 TABLET | Refills: 0 | Status: SHIPPED | OUTPATIENT
Start: 2023-04-04

## 2023-04-04 RX ORDER — FAMOTIDINE 40 MG/1
40 TABLET, FILM COATED ORAL
Qty: 90 TABLET | Refills: 3 | Status: SHIPPED | OUTPATIENT
Start: 2023-04-04

## 2023-04-04 RX ORDER — POTASSIUM CHLORIDE 1500 MG/1
20 TABLET, FILM COATED, EXTENDED RELEASE ORAL 2 TIMES DAILY
Qty: 180 TABLET | Refills: 1 | Status: SHIPPED | OUTPATIENT
Start: 2023-04-04 | End: 2023-04-18 | Stop reason: SDUPTHER

## 2023-04-04 RX ORDER — LEVOTHYROXINE SODIUM 0.03 MG/1
25 TABLET ORAL DAILY
Qty: 90 TABLET | Refills: 3 | Status: SHIPPED | OUTPATIENT
Start: 2023-04-04

## 2023-04-04 RX ORDER — LANOLIN ALCOHOL/MO/W.PET/CERES
1000 CREAM (GRAM) TOPICAL DAILY
Qty: 90 TABLET | Refills: 1 | Status: SHIPPED | OUTPATIENT
Start: 2023-04-04

## 2023-04-04 NOTE — PROGRESS NOTES
"Chief Complaint  Hypothyroidism and Diarrhea    Subjective          Keyana Nguyen presents to [facility]  History of Present Illness  Patient coming in today basically for recheck on her medical problems she says her overall conditioning and things that she says is doing pretty good she has had improvement of her diarrhea with the Lomotil she says she does take 1 tablet daily she says otherwise she is doing well her she has gained weight she says she generally feels better and stronger presently  Hypothyroidism  Associated symptoms include arthralgias and fatigue. Pertinent negatives include no congestion, nausea, rash or sore throat.   Diarrhea   Associated symptoms include arthralgias.       Objective   Vital Signs:   /68   Pulse 60   Ht 157.5 cm (62.01\")   Wt 43.2 kg (95 lb 4.8 oz)   SpO2 98%   BMI 17.43 kg/m²     Body mass index is 17.43 kg/m².    Review of Systems   Constitutional: Positive for fatigue.   HENT: Negative for congestion, dental problem, ear discharge, ear pain and sore throat.    Respiratory: Negative for apnea, chest tightness and shortness of breath.    Cardiovascular: Positive for palpitations.   Gastrointestinal: Positive for diarrhea. Negative for constipation and nausea.   Endocrine: Negative for polyuria.   Genitourinary: Negative for difficulty urinating.   Musculoskeletal: Positive for arthralgias. Negative for gait problem.   Skin: Negative for rash.   Hematological: Negative for adenopathy.       Past History:  Medical History: has a past medical history of Aortic regurgitation, Atrial fibrillation, Breast cancer, Essential hypertension, Fatigue, Herpes zoster with complication (10/3/2016), Hypothyroidism, Irritable bowel syndrome, Mitral valve insufficiency, Nonrheumatic aortic (valve) insufficiency, Osteopenia of the elderly, Postmenopausal status, Vitamin B12 deficiency, and Vitamin D deficiency.   Surgical History: has a past surgical " history that includes Breast surgery; Total knee arthroplasty; orthopedic surgery; tonsillectomy and adenoidectomy; Hysterectomy; Oophorectomy; Breast biopsy; and Mastectomy.         Current Outpatient Medications:   •  Acetaminophen (TYLENOL EXTRA STRENGTH PO), Take 1 tablet by mouth Daily., Disp: , Rfl:   •  bimatoprost (LUMIGAN) 0.01 % ophthalmic drops, Administer 1 drop to both eyes Every Night., Disp: , Rfl:   •  Biotin 1000 MCG tablet, 1,000 mcg., Disp: , Rfl:   •  calcium carbonate (OS-KAMAR) 600 MG tablet, Take 1 tablet by mouth Daily., Disp: , Rfl:   •  Cholecalciferol (Vitamin D3) 25 MCG (1000 UT) capsule, 1 capsule., Disp: , Rfl:   •  Cranberry 200 MG capsule, Take 500 mg by mouth Daily., Disp: , Rfl:   •  diphenoxylate-atropine (Lomotil) 2.5-0.025 MG per tablet, Take 1 tablet by mouth Daily., Disp: 90 tablet, Rfl: 0  •  famotidine (PEPCID) 40 MG tablet, Take 1 tablet by mouth every night at bedtime., Disp: 90 tablet, Rfl: 3  •  ferrous sulfate 325 (65 FE) MG tablet, Take 1 tablet by mouth Daily With Breakfast., Disp: , Rfl:   •  furosemide (LASIX) 20 MG tablet, Take 2 tablets by mouth Daily., Disp: 60 tablet, Rfl: 11  •  levothyroxine (SYNTHROID, LEVOTHROID) 25 MCG tablet, Take 1 tablet by mouth Daily., Disp: 90 tablet, Rfl: 3  •  metoprolol tartrate (LOPRESSOR) 25 MG tablet, Take 0.5 tablets by mouth 2 (Two) Times a Day., Disp: 30 tablet, Rfl: 11  •  Multiple Vitamins-Minerals (CENTRUM SILVER) tablet, Take 1 tablet by mouth Daily., Disp: , Rfl:   •  multivitamin with minerals (PRESERVISION AREDS PO), Take 1 tablet by mouth Daily., Disp: , Rfl:   •  potassium chloride ER (K-TAB) 20 MEQ tablet controlled-release ER tablet, Take 1 tablet by mouth 2 (Two) Times a Day., Disp: 180 tablet, Rfl: 1  •  tamsulosin (FLOMAX) 0.4 MG capsule 24 hr capsule, Take 1 capsule by mouth Daily., Disp: 90 capsule, Rfl: 1  •  vitamin B-12 (CYANOCOBALAMIN) 1000 MCG tablet, Take 1 tablet by mouth Daily., Disp: 90 tablet, Rfl:  1    Allergies: Patient has no known allergies.    Physical Exam  Vitals reviewed.   Constitutional:       Appearance: Normal appearance.   HENT:      Head: Normocephalic and atraumatic.      Right Ear: Tympanic membrane, ear canal and external ear normal.      Left Ear: Tympanic membrane, ear canal and external ear normal.      Nose: Nose normal.      Mouth/Throat:      Mouth: Mucous membranes are moist.   Eyes:      Extraocular Movements: Extraocular movements intact.      Conjunctiva/sclera: Conjunctivae normal.      Pupils: Pupils are equal, round, and reactive to light.   Cardiovascular:      Rate and Rhythm: Normal rate. Rhythm irregularly irregular.   Pulmonary:      Effort: Pulmonary effort is normal.      Breath sounds: Normal breath sounds.   Abdominal:      General: Abdomen is flat. Bowel sounds are normal.      Palpations: Abdomen is soft.   Musculoskeletal:         General: Normal range of motion.   Feet:      Right foot:      Protective Sensation: 0 sites tested. 0 sites sensed.      Toenail Condition: Right toenails are normal.      Left foot:      Protective Sensation: 0 sites tested. 0 sites sensed.      Toenail Condition: Left toenails are normal.   Skin:     General: Skin is warm and dry.   Neurological:      General: No focal deficit present.      Mental Status: She is alert and oriented to person, place, and time. Mental status is at baseline.   Psychiatric:         Behavior: Behavior normal.         Thought Content: Thought content normal.         Judgment: Judgment normal.          Result Review :                   Assessment and Plan    Diagnoses and all orders for this visit:    1. Acquired hypothyroidism (Primary)  Comments:  We will get TSH level and monitor and continue meds  Orders:  -     levothyroxine (SYNTHROID, LEVOTHROID) 25 MCG tablet; Take 1 tablet by mouth Daily.  Dispense: 90 tablet; Refill: 3    2. B12 deficiency  Comments:  Replacing refill medications  Orders:  -     vitamin  B-12 (CYANOCOBALAMIN) 1000 MCG tablet; Take 1 tablet by mouth Daily.  Dispense: 90 tablet; Refill: 1    3. Primary hypertension  Comments:  Stable continue meds still seeing cardiology    4. Permanent atrial fibrillation  Comments:  Stable presently    5. B12 deficiency  Comments:  Replacing and will get blood work  Orders:  -     vitamin B-12 (CYANOCOBALAMIN) 1000 MCG tablet; Take 1 tablet by mouth Daily.  Dispense: 90 tablet; Refill: 1    6. Urinary retention  Comments:  Continues on her Flomax and monitor  Orders:  -     tamsulosin (FLOMAX) 0.4 MG capsule 24 hr capsule; Take 1 capsule by mouth Daily.  Dispense: 90 capsule; Refill: 1    7. Hypokalemia  Comments:  Replacing and get level  Orders:  -     potassium chloride ER (K-TAB) 20 MEQ tablet controlled-release ER tablet; Take 1 tablet by mouth 2 (Two) Times a Day.  Dispense: 180 tablet; Refill: 1    8. Functional diarrhea  Comments:  We will try Lomotil daily  Orders:  -     diphenoxylate-atropine (Lomotil) 2.5-0.025 MG per tablet; Take 1 tablet by mouth Daily.  Dispense: 90 tablet; Refill: 0    Other orders  -     famotidine (PEPCID) 40 MG tablet; Take 1 tablet by mouth every night at bedtime.  Dispense: 90 tablet; Refill: 3              Follow Up   Return in about 3 months (around 7/4/2023).  Patient was given instructions and counseling regarding her condition or for health maintenance advice. Please see specific information pulled into the AVS if appropriate.     Alcides Schmidt MD

## 2023-04-05 LAB
ALBUMIN SERPL-MCNC: 4 G/DL (ref 3.5–4.6)
ALBUMIN/GLOB SERPL: 1.8 {RATIO} (ref 1.2–2.2)
ALP SERPL-CCNC: 75 IU/L (ref 44–121)
ALT SERPL-CCNC: 19 IU/L (ref 0–32)
AST SERPL-CCNC: 19 IU/L (ref 0–40)
BASOPHILS # BLD AUTO: 0.1 X10E3/UL (ref 0–0.2)
BASOPHILS NFR BLD AUTO: 1 %
BILIRUB SERPL-MCNC: 0.2 MG/DL (ref 0–1.2)
BUN SERPL-MCNC: 24 MG/DL (ref 10–36)
BUN/CREAT SERPL: 26 (ref 12–28)
CALCIUM SERPL-MCNC: 9.1 MG/DL (ref 8.7–10.3)
CHLORIDE SERPL-SCNC: 108 MMOL/L (ref 96–106)
CO2 SERPL-SCNC: 21 MMOL/L (ref 20–29)
CREAT SERPL-MCNC: 0.93 MG/DL (ref 0.57–1)
EGFRCR SERPLBLD CKD-EPI 2021: 56 ML/MIN/1.73
EOSINOPHIL # BLD AUTO: 0.1 X10E3/UL (ref 0–0.4)
EOSINOPHIL NFR BLD AUTO: 1 %
ERYTHROCYTE [DISTWIDTH] IN BLOOD BY AUTOMATED COUNT: 12.3 % (ref 11.7–15.4)
GLOBULIN SER CALC-MCNC: 2.2 G/DL (ref 1.5–4.5)
GLUCOSE SERPL-MCNC: 91 MG/DL (ref 70–99)
HCT VFR BLD AUTO: 40.3 % (ref 34–46.6)
HGB BLD-MCNC: 13.2 G/DL (ref 11.1–15.9)
IMM GRANULOCYTES # BLD AUTO: 0 X10E3/UL (ref 0–0.1)
IMM GRANULOCYTES NFR BLD AUTO: 0 %
LYMPHOCYTES # BLD AUTO: 1.6 X10E3/UL (ref 0.7–3.1)
LYMPHOCYTES NFR BLD AUTO: 21 %
MCH RBC QN AUTO: 29.9 PG (ref 26.6–33)
MCHC RBC AUTO-ENTMCNC: 32.8 G/DL (ref 31.5–35.7)
MCV RBC AUTO: 91 FL (ref 79–97)
MONOCYTES # BLD AUTO: 0.8 X10E3/UL (ref 0.1–0.9)
MONOCYTES NFR BLD AUTO: 11 %
NEUTROPHILS # BLD AUTO: 5.2 X10E3/UL (ref 1.4–7)
NEUTROPHILS NFR BLD AUTO: 66 %
PLATELET # BLD AUTO: 217 X10E3/UL (ref 150–450)
POTASSIUM SERPL-SCNC: 4.1 MMOL/L (ref 3.5–5.2)
PROT SERPL-MCNC: 6.2 G/DL (ref 6–8.5)
RBC # BLD AUTO: 4.42 X10E6/UL (ref 3.77–5.28)
SODIUM SERPL-SCNC: 142 MMOL/L (ref 134–144)
TSH SERPL DL<=0.005 MIU/L-ACNC: 4.12 UIU/ML (ref 0.45–4.5)
VIT B12 SERPL-MCNC: 1181 PG/ML (ref 232–1245)
WBC # BLD AUTO: 7.7 X10E3/UL (ref 3.4–10.8)

## 2023-04-18 ENCOUNTER — TELEPHONE (OUTPATIENT)
Dept: FAMILY MEDICINE CLINIC | Facility: CLINIC | Age: 88
End: 2023-04-18
Payer: MEDICARE

## 2023-04-18 DIAGNOSIS — E87.6 HYPOKALEMIA: ICD-10-CM

## 2023-04-18 RX ORDER — POTASSIUM CHLORIDE 1500 MG/1
20 TABLET, FILM COATED, EXTENDED RELEASE ORAL 2 TIMES DAILY
Qty: 180 TABLET | Refills: 1 | Status: SHIPPED | OUTPATIENT
Start: 2023-04-18

## 2023-04-18 NOTE — TELEPHONE ENCOUNTER
Caller: PetejeffreyLoraine    Relationship: Emergency Contact    Best call back number: [phone]    Requested Prescriptions:   Requested Prescriptions      No prescriptions requested or ordered in this encounter      potassium chloride ER (K-TAB) 20 MEQ tablet controlled-release ER tablet    Pharmacy where request should be sent:      [facility]/pharmacy #[id number] - [place], [place] - [address] - [phone]  - [phone] FX     Last office visit with prescribing clinician: 4/4/2023   Last telemedicine visit with prescribing clinician: 6/27/2023   Next office visit with prescribing clinician: 6/27/2023     Additional details provided by patient:     THE MAIL ORDER - [facility] - IS OUT OF MEDICATION.  PLEASE SEND TO [facility]  DAUGHTER IS ONLY GOING TO BE IN TOWN TODAY.  PLEASE CALL IN AS SOON AS POSSIBLE.      Mary Jane Chin, PCT   04/18/23 08:19 EDT

## 2023-04-20 ENCOUNTER — TELEPHONE (OUTPATIENT)
Dept: FAMILY MEDICINE CLINIC | Facility: CLINIC | Age: 88
End: 2023-04-20

## 2023-04-20 NOTE — TELEPHONE ENCOUNTER
Pharmacy Name: [facility] ([facility]) - [place], [place] - [address] [facility] [phone] [facility] [phone]      Pharmacy representative name: LOUISE     Pharmacy representative phone number: [phone]  REF#[id number]    What medication are you calling in regards to: tamsulosin (FLOMAX) 0.4 MG capsule 24 hr capsule    What question does the pharmacy have: LOUISE CALLED TO VERIFY THE USE OF FLOMAX IN A WOMAN.      Who is the provider that prescribed the medication: DR WISE    Additional notes: THEY HAVE SENT REQUEST BY FAX TWICE

## 2023-04-20 NOTE — TELEPHONE ENCOUNTER
LOUISE   REF#[id number]    Incoming call    Addressed with another pharmacy tech    has been on this before

## 2023-04-25 ENCOUNTER — OFFICE VISIT (OUTPATIENT)
Dept: CARDIOLOGY | Facility: CLINIC | Age: 88
End: 2023-04-25
Payer: MEDICARE

## 2023-04-25 VITALS
HEIGHT: 62 IN | WEIGHT: 93 LBS | BODY MASS INDEX: 17.11 KG/M2 | SYSTOLIC BLOOD PRESSURE: 126 MMHG | HEART RATE: 60 BPM | DIASTOLIC BLOOD PRESSURE: 80 MMHG

## 2023-04-25 DIAGNOSIS — I48.21 PERMANENT ATRIAL FIBRILLATION: Primary | ICD-10-CM

## 2023-04-25 DIAGNOSIS — I34.0 NONRHEUMATIC MITRAL VALVE REGURGITATION: ICD-10-CM

## 2023-04-25 DIAGNOSIS — I10 ESSENTIAL HYPERTENSION: ICD-10-CM

## 2023-04-25 DIAGNOSIS — I35.1 NONRHEUMATIC AORTIC VALVE INSUFFICIENCY: ICD-10-CM

## 2023-04-25 DIAGNOSIS — I36.1 NONRHEUMATIC TRICUSPID VALVE REGURGITATION: ICD-10-CM

## 2023-04-25 PROCEDURE — 99214 OFFICE O/P EST MOD 30 MIN: CPT | Performed by: INTERNAL MEDICINE

## 2023-04-25 PROCEDURE — 93000 ELECTROCARDIOGRAM COMPLETE: CPT | Performed by: INTERNAL MEDICINE

## 2023-04-25 NOTE — PROGRESS NOTES
Date of Office Visit: 2023  Encounter Provider: Kylah Jones MD  Place of Service: [facility]  Patient Name: Keyana Nguyen  :1925      Patient ID:  Keyana Nguyen is a 89 y.o. female is here for  followup for atrial fibrillation.        History of Present Illness    She has a history of atrial fibrillation, venous incompetence, valvular insufficiency, history of breast cancer.    She moved to [place] for several years and moved  Back.  When she was in [place], she was  following with a cardiologist there, Dr. Buenrostro, who was with [facility].   She had had imaging studies done there. She had an echocardiogram done on 2015,  showing moderate to severe aortic insufficiency, moderate to severe mitral insufficiency,  aortic valvular sclerosis without stenosis, concentric left ventricular hypertrophy with  ejection fraction of 63%, and elevated right ventricular systolic pressure of 48 mmHg.   She had had a prior echocardiogram done on 10/01/2014, which was more normal in appearance  so she definitely had progression of her valvular heart disease. In addition, she had a  carotid duplex study done on 2014, showing less than 40% right internal carotid  artery stenosis and 20%-40% left internal carotid artery stenosis. She has a known  history of premature atrial complexes and a history of vertigo as well.     When she came in, she was in atrial fibrillation, which was new  Her [id number] score, she has a 3.2% annual risk of stroke.      She has a strong family history of stroke.  She lives in [facility] in [place] and has caregivers that come in round-the-clock to care for her there.  Eure is nonmedical.     She had a Holter recording done 2015 which showed underlying atrial  fibrillation with average heart rate of 83 beats per minute and no significant pauses.     She had an echo done 3/2016  showing ejection fraction 62%, moderate left atrial enlargement, mild-to-moderate mitral insufficiency, mild aortic insufficiency, mild-to-moderate tricuspid insufficiency.  She had a normal carotid study done 3/2016.     Labs on 4/4/2023 show normal CMP, normal TSH, normal CBC.  She does not feel her heart racing or skipping.  She sometimes feels weak or slightly dizzy during the day but its not frequently.  She has no real body aches or pains.  She has no headaches.  She does not feel her heart racing or skipping and she has no chest pain or pressure.  She has no orthopnea or PND.  Her appetite is poor but she is trying to maintain her weight.  She has no exertional dyspnea.    Past Medical History:   Diagnosis Date   • Aortic regurgitation    • Atrial fibrillation    • Breast cancer    • Essential hypertension    • Fatigue    • Herpes zoster with complication 10/3/2016   • Hypothyroidism    • Irritable bowel syndrome    • Mitral valve insufficiency    • Nonrheumatic aortic (valve) insufficiency    • Osteopenia of the elderly    • Postmenopausal status    • Vitamin B12 deficiency    • Vitamin D deficiency          Past Surgical History:   Procedure Laterality Date   • BREAST BIOPSY     • BREAST SURGERY     • HYSTERECTOMY     • MASTECTOMY     • OOPHORECTOMY     • ORTHOPEDIC SURGERY     • TONSILLECTOMY AND ADENOIDECTOMY     • TOTAL KNEE ARTHROPLASTY         Current Outpatient Medications on File Prior to Visit   Medication Sig Dispense Refill   • Acetaminophen (TYLENOL EXTRA STRENGTH PO) Take 1 tablet by mouth Daily.     • bimatoprost (LUMIGAN) 0.01 % ophthalmic drops Administer 1 drop to both eyes Every Night.     • Biotin 1000 MCG tablet 1,000 mcg.     • calcium carbonate (OS-KAMAR) 600 MG tablet Take 1 tablet by mouth Daily.     • Cholecalciferol (Vitamin D3) 25 MCG (1000 UT) capsule 1 capsule.     • Cranberry 200 MG capsule Take 500 mg by mouth Daily.     • diphenoxylate-atropine (Lomotil) 2.5-0.025 MG per tablet  "Take 1 tablet by mouth Daily. 90 tablet 0   • famotidine (PEPCID) 40 MG tablet Take 1 tablet by mouth every night at bedtime. 90 tablet 3   • ferrous sulfate 325 (65 FE) MG tablet Take 1 tablet by mouth Daily With Breakfast.     • furosemide (LASIX) 20 MG tablet Take 2 tablets by mouth Daily. (Patient taking differently: Take 1 tablet by mouth Daily.) 60 tablet 11   • levothyroxine (SYNTHROID, LEVOTHROID) 25 MCG tablet Take 1 tablet by mouth Daily. 90 tablet 3   • metoprolol tartrate (LOPRESSOR) 25 MG tablet Take 0.5 tablets by mouth 2 (Two) Times a Day. 30 tablet 11   • Multiple Vitamins-Minerals (CENTRUM SILVER) tablet Take 1 tablet by mouth Daily.     • multivitamin with minerals tablet tablet Take 1 tablet by mouth Daily.     • potassium chloride ER (K-TAB) 20 MEQ tablet controlled-release ER tablet Take 1 tablet by mouth 2 (Two) Times a Day. 180 tablet 1   • tamsulosin (FLOMAX) 0.4 MG capsule 24 hr capsule Take 1 capsule by mouth Daily. 90 capsule 1   • vitamin B-12 (CYANOCOBALAMIN) 1000 MCG tablet Take 1 tablet by mouth Daily. 90 tablet 1     No current facility-administered medications on file prior to visit.       Social History     Socioeconomic History   • Marital status:    Tobacco Use   • Smoking status: Never   • Smokeless tobacco: Never   Substance and Sexual Activity   • Alcohol use: No     Comment: caffeine use: 6 cups daily    • Drug use: No   • Sexual activity: Never           ROS    Procedures    ECG 12 Lead    Date/Time: 4/25/2023 2:29 PM  Performed by: Kylah Jones MD  Authorized by: Kylah Jones MD   Comparison: compared with previous ECG   Similar to previous ECG  Rhythm: atrial fibrillation  Q waves: aVL, V1, V2 and V3    T inversion: V2    Clinical impression: abnormal EKG                Objective:      Vitals:    04/25/23 1354   BP: 126/80   Pulse: 60   Weight: 42.2 kg (93 lb)   Height: 157.5 cm (62.01\")     Body mass index is 17 kg/m².    Vitals reviewed. "   Constitutional:       General: Not in acute distress.     Appearance: Well-developed. Not diaphoretic.   Eyes:      General: No scleral icterus.     Conjunctiva/sclera: Conjunctivae normal.   HENT:      Head: Normocephalic and atraumatic.   Neck:      Thyroid: No thyromegaly.      Vascular: No carotid bruit or JVD.      Lymphadenopathy: No cervical adenopathy.   Pulmonary:      Effort: Pulmonary effort is normal. No respiratory distress.      Breath sounds: Normal breath sounds. No wheezing. No rhonchi. No rales.   Chest:      Chest wall: Not tender to palpatation.   Cardiovascular:      Normal rate. Irregularly irregular rhythm.      No gallop.   Pulses:     Intact distal pulses.   Edema:     Peripheral edema absent.   Abdominal:      General: Bowel sounds are normal. There is no distension or abdominal bruit.      Palpations: Abdomen is soft. There is no abdominal mass.      Tenderness: There is no abdominal tenderness.   Musculoskeletal:         General: No deformity.      Extremities: No clubbing present.     Cervical back: Neck supple. Skin:     General: Skin is warm and dry. There is no cyanosis.      Coloration: Skin is not pale.      Findings: No rash.   Neurological:      Mental Status: Alert and oriented to person, place, and time.      Cranial Nerves: No cranial nerve deficit.   Psychiatric:         Judgment: Judgment normal.         Lab Review:       Assessment:      Diagnosis Plan   1. Permanent atrial fibrillation        2. Essential hypertension        3. Nonrheumatic mitral valve regurgitation        4. Nonrheumatic tricuspid valve regurgitation        5. Nonrheumatic aortic valve insufficiency          1. Atrial fibrillation.  On Eliquis 2.5 mg twice daily, she has a [id number] score, which gives her a high annual risk of stroke at 3.2%.    2. Mild to moderate mitral insufficiency.    3. Mild aortic insufficiency.   4. Mild to moderate tricuspid insufficiency.    5. Urinary incontinence.     6. History of right breast cancer.    7. Chronic venous insufficiency.  She is elevating her legs and wearing compression stockings.    8. Weight loss.  She is trying to maintain her weight.  9. Strong family history of stroke.       Plan:       Stop metoprolol, her heart rate is well controlled and her blood pressure is controlled.  She is on a very small dose so not sure truly helping anyway.  She has no lower extremity edema, stop Lasix and potassium to avoid side effects.  See Larissa in 6 weeks to reassess edema and make sure she does not need Lasix or potassium.  I did tell them they can use on an as-needed basis for lower extremity edema.

## 2023-06-01 ENCOUNTER — TELEPHONE (OUTPATIENT)
Dept: FAMILY MEDICINE CLINIC | Facility: CLINIC | Age: 88
End: 2023-06-01

## 2023-06-01 NOTE — TELEPHONE ENCOUNTER
Caller: Loraine Olivares    Relationship to patient: Emergency Contact    Best call back number: [phone]    Chief complaint: ARM PAIN     Type of visit: SAME DAY    Requested date: TODAY, 6.1.23     Additional notes: PLEASE CALL AS SOON AS POSSIBLE TO SCHEDULE OR ADVISE.     THANK YOU.

## 2023-06-06 ENCOUNTER — OFFICE VISIT (OUTPATIENT)
Dept: CARDIOLOGY | Facility: CLINIC | Age: 88
End: 2023-06-06
Payer: MEDICARE

## 2023-06-06 VITALS
HEIGHT: 62 IN | BODY MASS INDEX: 18.22 KG/M2 | WEIGHT: 99 LBS | HEART RATE: 75 BPM | SYSTOLIC BLOOD PRESSURE: 140 MMHG | DIASTOLIC BLOOD PRESSURE: 82 MMHG

## 2023-06-06 DIAGNOSIS — I48.21 PERMANENT ATRIAL FIBRILLATION: Primary | ICD-10-CM

## 2023-06-06 DIAGNOSIS — I36.1 NONRHEUMATIC TRICUSPID VALVE REGURGITATION: ICD-10-CM

## 2023-06-06 DIAGNOSIS — I35.1 NONRHEUMATIC AORTIC VALVE INSUFFICIENCY: ICD-10-CM

## 2023-06-06 DIAGNOSIS — M79.89 LEG SWELLING: ICD-10-CM

## 2023-06-06 DIAGNOSIS — I10 PRIMARY HYPERTENSION: ICD-10-CM

## 2023-06-06 DIAGNOSIS — R29.6 FALLS FREQUENTLY: ICD-10-CM

## 2023-06-06 DIAGNOSIS — I34.0 NONRHEUMATIC MITRAL VALVE REGURGITATION: ICD-10-CM

## 2023-06-06 RX ORDER — MULTIPLE VITAMINS W/ MINERALS TAB 9MG-400MCG
1 TAB ORAL DAILY
Qty: 30 TABLET | Refills: 11
Start: 2023-06-06

## 2023-06-06 NOTE — PROGRESS NOTES
[facility] CARDIOLOGY  [address]  [facility] [address]  Phone: [phone]  Fax: [phone]      Patient Name: Keyana Nguyen  :1925  Age: 89 y.o.  Primary Cardiologist: Kylah Jones MD  Encounter Provider:  DUSTIN Douglass      Chief Complaint     Chief Complaint: Atrial Fibrillation  Leg swelling    SUBJECTIVE     History of Present Illness:  Keyana Nguyen is a 89 y.o.  female whose medical history includes hypertension, hypothyroidism, and history of right breast cancer.  She is followed in our office by Dr. Jones for atrial fibrillation, aortic insufficiency, mitral insufficiency, tricuspid insufficiency.     23 Follow-up:  She is here for 6-week follow-up.  At last visit Dr. Jones stopped her metoprolol, Lasix, and potassium given her frailty.  She has not had any Lasix since last visit but her legs have started to swell the past 2 to 3 days.  She had trouble getting her shoes on this morning.  She denies any dyspnea with exertion or orthopnea.  She had 1 fall since last visit; it happened when she got up in the middle of the night to use the bathroom.  She feels like she may have still been asleep and missed the toilet.  She did have a skin tear on her right upper arm which is healed.  She denies any syncope or lightheadedness.  She was not able to get up but her son was able to help her get up at that time.  She denies chest pain.  She has stable palpitations at night.    Below is a summary of pertinent cardiology findings:  Previously followed with Dr. Buenrostro at [facility] in [place].  History of PACs.  2014 carotid artery duplex: Less than 40% right internal carotid artery stenosis and 20 to 40% left internal carotid artery stenosis.  3/23/2015 echocardiogram: Moderate to severe aortic insufficiency, moderate to severe mitral insufficiency, aortic valvular sclerosis without  stenosis, concentric left ventricular hypertrophy, EF 63%, RVSP 48 mmHg.  Were significant changes compared to echocardiogram in October 2014.  6/30/2015 Holter monitor: Underlying atrial fibrillation with average heart rate 83 bpm and no significant pauses.  This was a new diagnosis.  March 2016 echocardiogram: EF 62%, moderate left atrial enlargement, mild to moderate mitral insufficiency, mild aortic insufficiency, mild to moderate tricuspid insufficiency.  October 2021 She fell and hurt her neck; she was released from the ER.   April 2022 she fell and broke several left ribs. She was admitted to [facility] but was readmitted shortly after discharge for ESBL UTI. She was then discharged to rehab. Her anticoagulant was stopped during hospitalization due to falls.   July 2022 seen for leg swelling and amlodipine was stopped. August 2022 she continued to have swelling and was started on lasix. Her weight dropped to 85 lbs; lasix was reduced.     Past Medical History:   Diagnosis Date    Aortic regurgitation     Atrial fibrillation     Breast cancer     Essential hypertension     Fatigue     Herpes zoster with complication 10/3/2016    Hypothyroidism     Irritable bowel syndrome     Mitral valve insufficiency     Nonrheumatic aortic (valve) insufficiency     Osteopenia of the elderly     Postmenopausal status     Vitamin B12 deficiency     Vitamin D deficiency        Past Surgical History:  Right mastectomy  hysterectomy with oophorectomy  total knee arthroplasty  Tonsillectomy and adenoidectomy    Social History     Socioeconomic History    Marital status:    Tobacco Use    Smoking status: Never    Smokeless tobacco: Never   Substance and Sexual Activity    Alcohol use: No     Comment: caffeine use: 6 cups daily     Drug use: No    Sexual activity: Never         Review of Systems     Review of Systems   Cardiovascular:  Positive for leg swelling. Negative for chest pain, claudication, cyanosis, dyspnea on  "exertion, irregular heartbeat, near-syncope, orthopnea, palpitations, paroxysmal nocturnal dyspnea and syncope.   Musculoskeletal:  Positive for falls.   Gastrointestinal:  Negative for diarrhea.   Psychiatric/Behavioral:  Positive for hallucinations.      Medications     Allergies as of 06/06/2023    (No Known Allergies)       Acetaminophen, Biotin, Cranberry, Vitamin D3, bimatoprost, calcium carbonate, diphenoxylate-atropine, famotidine, ferrous sulfate, levothyroxine, multivitamin with minerals, tamsulosin, and vitamin B-12       OBJECTIVE     Vital Signs:   /82   Pulse 75   Ht 157.5 cm (62.01\")   Wt 44.9 kg (99 lb)   BMI 18.10 kg/m²       Weight:  Wt Readings from Last 3 Encounters:   06/06/23 44.9 kg (99 lb)   04/25/23 42.2 kg (93 lb)   04/04/23 43.2 kg (95 lb 4.8 oz)     Body mass index is 18.1 kg/m².      Physical Exam     Physical Exam  Constitutional:       General: She is not in acute distress.  HENT:      Head: Normocephalic and atraumatic.      Mouth/Throat:      Mouth: Mucous membranes are moist.   Eyes:      General: No scleral icterus.     Extraocular Movements: Extraocular movements intact.      Conjunctiva/sclera: Conjunctivae normal.      Pupils: Pupils are equal, round, and reactive to light.   Cardiovascular:      Rate and Rhythm: Normal rate. Rhythm irregular and rhythm irregularly irregular.      Pulses: Normal pulses.      Heart sounds: S1 normal and S2 normal.   Pulmonary:      Effort: No respiratory distress.      Breath sounds: Normal breath sounds. No wheezing, rhonchi or rales.   Abdominal:      General: Bowel sounds are normal. There is no distension.      Palpations: Abdomen is soft.      Tenderness: There is no abdominal tenderness.   Musculoskeletal:         General: Normal range of motion.      Cervical back: Normal range of motion and neck supple.      Right lower leg: Edema present.      Left lower leg: Edema present.   Skin:     General: Skin is warm and dry.      " Coloration: Skin is not jaundiced.   Neurological:      Mental Status: She is alert and oriented to person, place, and time.   Psychiatric:         Mood and Affect: Mood normal.       Reviewed Data     Result Review :  The following data was reviewed by DUSTIN Doulgass on 06/06/23:  Labs 10/03/2022:  cr 0.9, K 4.0, , otherwise unremarkable CMP, TSH 0.977, Hgb 13.2, Plt 239  Labs 08/19/2022:  cr 0.7, K 3.2, otherwise unremarkable CMP, Hgb 9.2, Plt 384, TSH 1.180    Labs 06.20.2022:  cr 0.6, K 4.4, otherwise unremarkable CMP, Hgb 9.8, Plt 105  Labs 06.08.2022:  TSH 2.01  Labs 05/23/2022:  cr 0.7, K 3.0, otherwise unremarkable CMP, Hgb 9.2, Plt 278  04/04/2023: cr 0.9, K4.1, otherwise unremarkable CMP, TSH 4.120, Hgb 13.2,       ECG 12 Lead    Date/Time: 6/6/2023 2:38 PM  Performed by: Mechelle Pemberton APRN  Authorized by: Mechelle Pemberton APRN   Comparison: compared with previous ECG from 4/25/2023  Similar to previous ECG  Rhythm: atrial fibrillation  Rate: normal  BPM: 75    Clinical impression: abnormal EKG        Assessment and Plan        Assessment and Plan     Assessment:  1. Permanent atrial fibrillation    2. Nonrheumatic aortic valve insufficiency    3. Nonrheumatic mitral valve regurgitation    4. Nonrheumatic tricuspid valve regurgitation    5. Primary hypertension    6. Falls frequently    7. Leg swelling         Leg swelling: She reports leg swelling for the past 2-3 days off lasix; it is minimal on my review.  Atrial fibrillation: This is permanent and rate controlled.  Her anticoagulation was stopped during hospitalization due to frequent falls.  Her heart rate is controlled off metoprolol.   Aortic insufficiency: Noted on echocardiogram in March 2015 and again on echo and March 2016; graded as mild in 2016.  Compensated off lasix and metoprolol.   Mitral insufficiency: Noted on echocardiogram from March 2015 and again echo on March 2016; graded as  mild to moderate.  Stable.   Tricuspid insufficiency: Noted on echocardiogram from March 2015 and again on echo in March 2016; graded as mild to moderate.  Stable.   Hypertension: Stable off all BP medications.   Hypothyroidism she is treated with levothyroxine and TSH was within range when checked in this 2023; levothyroxine dose is the same.  Frequent falls; she had 2 falls in 2022 resulting in injury.  Her anticoagulation has now been stopped.  She walks with a walker.  She had one fall during the night since last visit; she had skin tear which has healed.         Plan:  I'm going to have her take 20 mg lasix and 20 mEq KCl for the next 3 days then stop. I will have the office call her next week to check on leg swelling.   I will see her back in 3 months.       Follow Up:  Return in about 3 months (around 9/6/2023) for Follow-up with DUSTIN Rodriguez.  Orders Placed This Encounter   Procedures    ECG 12 Lead      New Medications Ordered This Visit   Medications    multivitamin with minerals tablet tablet     Sig: Take 1 tablet by mouth Daily.     Dispense:  30 tablet     Refill:  11         Thank you for the opportunity to participate in this patient's care.    DUSTIN Rodriguez    This office note has been dictated.

## 2023-06-07 ENCOUNTER — TELEPHONE (OUTPATIENT)
Dept: CARDIOLOGY | Facility: CLINIC | Age: 88
End: 2023-06-07
Payer: MEDICARE

## 2023-06-07 NOTE — TELEPHONE ENCOUNTER
Please call her daughter and get her scheduled to see Dr. Jones in 3 months at .     Thanks!  DUSTIN Rodriguez

## 2023-06-18 DIAGNOSIS — R33.9 URINARY RETENTION: ICD-10-CM

## 2023-06-19 ENCOUNTER — TELEPHONE (OUTPATIENT)
Dept: CARDIOLOGY | Facility: CLINIC | Age: 88
End: 2023-06-19
Payer: MEDICARE

## 2023-06-19 RX ORDER — POTASSIUM CHLORIDE 750 MG/1
20 TABLET, FILM COATED, EXTENDED RELEASE ORAL DAILY
Qty: 30 TABLET | Refills: 11 | Status: SHIPPED | OUTPATIENT
Start: 2023-06-19

## 2023-06-19 RX ORDER — POTASSIUM CHLORIDE 1500 MG/1
20 TABLET, EXTENDED RELEASE ORAL DAILY
Qty: 30 TABLET | Refills: 11 | Status: SHIPPED | OUTPATIENT
Start: 2023-06-19 | End: 2023-06-19

## 2023-06-19 RX ORDER — FUROSEMIDE 20 MG/1
20 TABLET ORAL DAILY
Qty: 30 TABLET | Refills: 11 | Status: SHIPPED | OUTPATIENT
Start: 2023-06-19 | End: 2023-06-19

## 2023-06-19 RX ORDER — FUROSEMIDE 20 MG/1
20 TABLET ORAL DAILY
Qty: 30 TABLET | Refills: 11 | Status: SHIPPED | OUTPATIENT
Start: 2023-06-19

## 2023-06-19 RX ORDER — TAMSULOSIN HYDROCHLORIDE 0.4 MG/1
1 CAPSULE ORAL DAILY
Qty: 100 CAPSULE | Refills: 2 | Status: SHIPPED | OUTPATIENT
Start: 2023-06-19

## 2023-06-19 NOTE — TELEPHONE ENCOUNTER
I think we should continue the lasix and potassium; 20 mg lasix daily and 20 mEq KCl daily. I've sent this in.     Thanks!  Larissa Pemberton, APRN

## 2023-06-19 NOTE — TELEPHONE ENCOUNTER
Pt's daughter, Loraine, called to update our office on pt's edema post 3 days of 20 mg QD furosemide.  The 3 day period ended 6/9/23.  Daughter reports pt had no improvement in her ankle and pedal edema.  She is urinating well.  BP today 136/76.    If you choose to prescribe additional furosemide, they request for this to be sent to [facility].  If you want to prescribe anything else, they request for it to be sent to their [facility] on file.    Thank you,    Alexia Hawthorne, VANIA  Triage [facility]  06/19/23 12:01 EDT

## 2023-06-19 NOTE — TELEPHONE ENCOUNTER
I updated Loranie on the POC for medications.  I sent both prescriptions into [facility] per their request.    Thank you,    Alexia Hawthorne, RN  Triage [facility]  06/19/23 12:34 EDT

## 2023-07-31 RX ORDER — POTASSIUM CHLORIDE 750 MG/1
20 TABLET, FILM COATED, EXTENDED RELEASE ORAL DAILY
Qty: 60 TABLET | Refills: 6 | Status: SHIPPED | OUTPATIENT
Start: 2023-07-31

## 2023-09-18 ENCOUNTER — OFFICE VISIT (OUTPATIENT)
Dept: FAMILY MEDICINE CLINIC | Facility: CLINIC | Age: 88
End: 2023-09-18
Payer: MEDICARE

## 2023-09-18 VITALS
SYSTOLIC BLOOD PRESSURE: 116 MMHG | BODY MASS INDEX: 17.63 KG/M2 | OXYGEN SATURATION: 98 % | HEIGHT: 62 IN | RESPIRATION RATE: 20 BRPM | TEMPERATURE: 98.1 F | HEART RATE: 62 BPM | DIASTOLIC BLOOD PRESSURE: 78 MMHG | WEIGHT: 95.8 LBS

## 2023-09-18 DIAGNOSIS — Z00.00 ROUTINE GENERAL MEDICAL EXAMINATION AT A HEALTH CARE FACILITY: Primary | ICD-10-CM

## 2023-09-18 DIAGNOSIS — R33.9 URINARY RETENTION: ICD-10-CM

## 2023-09-18 DIAGNOSIS — I48.21 PERMANENT ATRIAL FIBRILLATION: ICD-10-CM

## 2023-09-18 DIAGNOSIS — E53.8 B12 DEFICIENCY: ICD-10-CM

## 2023-09-18 DIAGNOSIS — E87.6 HYPOKALEMIA: ICD-10-CM

## 2023-09-18 DIAGNOSIS — D64.9 ANEMIA, UNSPECIFIED TYPE: ICD-10-CM

## 2023-09-18 DIAGNOSIS — K59.1 FUNCTIONAL DIARRHEA: ICD-10-CM

## 2023-09-18 DIAGNOSIS — E03.9 ACQUIRED HYPOTHYROIDISM: ICD-10-CM

## 2023-09-18 DIAGNOSIS — I10 PRIMARY HYPERTENSION: ICD-10-CM

## 2023-09-18 RX ORDER — DIPHENOXYLATE HYDROCHLORIDE AND ATROPINE SULFATE 2.5; .025 MG/1; MG/1
1 TABLET ORAL DAILY
Qty: 90 TABLET | Refills: 0 | Status: SHIPPED | OUTPATIENT
Start: 2023-09-18

## 2023-09-18 RX ORDER — FAMOTIDINE 40 MG/1
40 TABLET, FILM COATED ORAL
Qty: 90 TABLET | Refills: 3 | Status: CANCELLED | OUTPATIENT
Start: 2023-09-18

## 2023-09-18 NOTE — PROGRESS NOTES
"Chief Complaint  Hypothyroidism, Atrial Fibrillation, Diarrhea, and Annual Exam    Subjective          Keyana Nguyen presents to [facility]  History of Present Illness  Comes in today to get her medications refilled get things rechecked she says she doing relatively well with no problems difficulties and she said no shortness of breath or other chest pains she says generally she is doing well she says her diarrhea is doing much better her recently amount of stool she has been having is really been reduced she says that she has occasionally had a problem but she said for most days its been actually much improved pecks about a 1 day out of the week she may have a problem     Objective   Vital Signs:   /78   Pulse 62   Temp 98.1 °F (36.7 °C)   Resp 20   Ht 157.5 cm (62.01\")   Wt 43.5 kg (95 lb 12.8 oz)   SpO2 98%   BMI 17.52 kg/m²     Body mass index is 17.52 kg/m².    Review of Systems   Constitutional: Negative.    HENT:  Negative for congestion, dental problem, ear discharge, ear pain and sore throat.    Respiratory:  Negative for apnea, chest tightness and shortness of breath.    Gastrointestinal:  Negative for constipation and nausea.   Endocrine: Negative for polyuria.   Genitourinary:  Negative for difficulty urinating.   Musculoskeletal:  Negative for arthralgias and gait problem.   Skin:  Negative for rash.   Hematological:  Negative for adenopathy.     Past History:  Medical History: has a past medical history of Aortic regurgitation, Atrial fibrillation, Breast cancer, Essential hypertension, Fatigue, Herpes zoster with complication (10/3/2016), Hypothyroidism, Irritable bowel syndrome, Mitral valve insufficiency, Nonrheumatic aortic (valve) insufficiency, Osteopenia of the elderly, Postmenopausal status, Vitamin B12 deficiency, and Vitamin D deficiency.   Surgical History: has a past surgical history that includes Breast surgery; Total knee arthroplasty; " orthopedic surgery; tonsillectomy and adenoidectomy; Hysterectomy; Oophorectomy; Breast biopsy; and Mastectomy.         Current Outpatient Medications:     Acetaminophen (TYLENOL EXTRA STRENGTH PO), Take 1 tablet by mouth Daily., Disp: , Rfl:     bimatoprost (LUMIGAN) 0.01 % ophthalmic drops, Administer 1 drop to both eyes Every Night., Disp: , Rfl:     Biotin 34588 MCG tablet, , Disp: , Rfl:     calcium carbonate (OS-KAMAR) 600 MG tablet, Take 1 tablet by mouth Daily., Disp: , Rfl:     Cholecalciferol (Vitamin D3) 25 MCG (1000 UT) capsule, 1 capsule., Disp: , Rfl:     Cranberry 200 MG capsule, Take 500 mg by mouth Daily., Disp: , Rfl:     diphenoxylate-atropine (Lomotil) 2.5-0.025 MG per tablet, Take 1 tablet by mouth Daily., Disp: 90 tablet, Rfl: 0    famotidine (PEPCID) 40 MG tablet, Take 1 tablet by mouth every night at bedtime., Disp: 90 tablet, Rfl: 3    ferrous sulfate 325 (65 FE) MG tablet, Take 1 tablet by mouth Daily With Breakfast., Disp: , Rfl:     furosemide (LASIX) 20 MG tablet, Take 1 tablet by mouth Daily., Disp: 30 tablet, Rfl: 11    levothyroxine (SYNTHROID, LEVOTHROID) 25 MCG tablet, Take 1 tablet by mouth Daily., Disp: 90 tablet, Rfl: 3    multivitamin with minerals tablet tablet, Take 1 tablet by mouth Daily., Disp: 30 tablet, Rfl: 11    potassium chloride 10 MEQ CR tablet, Take 2 tablets by mouth Daily., Disp: 60 tablet, Rfl: 6    tamsulosin (FLOMAX) 0.4 MG capsule 24 hr capsule, TAKE 1 CAPSULE BY MOUTH DAILY, Disp: 100 capsule, Rfl: 2    vitamin B-12 (CYANOCOBALAMIN) 1000 MCG tablet, Take 1 tablet by mouth Daily., Disp: 90 tablet, Rfl: 1    Allergies: Patient has no known allergies.    Physical Exam  Vitals reviewed.   Constitutional:       Appearance: Normal appearance.   HENT:      Head: Normocephalic and atraumatic.      Right Ear: Tympanic membrane, ear canal and external ear normal.      Left Ear: Tympanic membrane, ear canal and external ear normal.      Nose: Nose normal.       Mouth/Throat:      Mouth: Mucous membranes are moist.   Eyes:      Extraocular Movements: Extraocular movements intact.      Conjunctiva/sclera: Conjunctivae normal.      Pupils: Pupils are equal, round, and reactive to light.   Cardiovascular:      Rate and Rhythm: Normal rate and regular rhythm.   Pulmonary:      Effort: Pulmonary effort is normal.      Breath sounds: Normal breath sounds.   Abdominal:      General: Abdomen is flat. Bowel sounds are normal.      Palpations: Abdomen is soft.   Musculoskeletal:         General: Normal range of motion.   Feet:      Right foot:      Protective Sensation: 0 sites tested.  0 sites sensed.      Toenail Condition: Right toenails are normal.      Left foot:      Protective Sensation: 0 sites tested.  0 sites sensed.      Toenail Condition: Left toenails are normal.   Skin:     General: Skin is warm and dry.   Neurological:      General: No focal deficit present.      Mental Status: She is alert and oriented to person, place, and time. Mental status is at baseline.   Psychiatric:         Behavior: Behavior normal.         Thought Content: Thought content normal.         Judgment: Judgment normal.        Result Review :                   Assessment and Plan    Diagnoses and all orders for this visit:    1. Routine general medical examination at a health care facility (Primary)  Comments:  Discussed diet activities and overall her general health  Orders:  -     CBC & Differential; Future  -     Comprehensive Metabolic Panel; Future    2. Functional diarrhea  Comments:  Improved with Lomotil  Orders:  -     diphenoxylate-atropine (Lomotil) 2.5-0.025 MG per tablet; Take 1 tablet by mouth Daily.  Dispense: 90 tablet; Refill: 0    3. B12 deficiency  Comments:  Blood work refill medications  Orders:  -     Vitamin B12; Future    4. Urinary retention  Comments:  Improved with treatment    5. Hypokalemia  Comments:  Replacing we will get level    6. Permanent atrial  fibrillation  Comments:  Rate controlled continue medications    7. Acquired hypothyroidism  Comments:  Check TSH  Orders:  -     TSH; Future    8. Primary hypertension  Comments:  Stable    9. Anemia, unspecified type  Comments:  Check CBC            Updated annual wellness visit checklist.  Immunizations discussed.  Screening up-to-date.  Recommend yearly dental and eye exams. Also discussed monitoring of blood pressure and lipids.     Follow Up   No follow-ups on file.  Patient was given instructions and counseling regarding her condition or for health maintenance advice. Please see specific information pulled into the AVS if appropriate.     Alcides Schmidt MD

## 2023-09-19 LAB
ALBUMIN SERPL-MCNC: 4.1 G/DL (ref 3.6–4.6)
ALBUMIN/GLOB SERPL: 2.1 {RATIO} (ref 1.2–2.2)
ALP SERPL-CCNC: 73 IU/L (ref 44–121)
ALT SERPL-CCNC: 17 IU/L (ref 0–32)
AST SERPL-CCNC: 22 IU/L (ref 0–40)
BASOPHILS # BLD AUTO: 0.1 X10E3/UL (ref 0–0.2)
BASOPHILS NFR BLD AUTO: 1 %
BILIRUB SERPL-MCNC: 0.5 MG/DL (ref 0–1.2)
BUN SERPL-MCNC: 18 MG/DL (ref 10–36)
BUN/CREAT SERPL: 23 (ref 12–28)
CALCIUM SERPL-MCNC: 8.7 MG/DL (ref 8.7–10.3)
CHLORIDE SERPL-SCNC: 107 MMOL/L (ref 96–106)
CO2 SERPL-SCNC: 19 MMOL/L (ref 20–29)
CREAT SERPL-MCNC: 0.79 MG/DL (ref 0.57–1)
EGFRCR SERPLBLD CKD-EPI 2021: 68 ML/MIN/1.73
EOSINOPHIL # BLD AUTO: 0.1 X10E3/UL (ref 0–0.4)
EOSINOPHIL NFR BLD AUTO: 1 %
ERYTHROCYTE [DISTWIDTH] IN BLOOD BY AUTOMATED COUNT: 13.7 % (ref 11.7–15.4)
GLOBULIN SER CALC-MCNC: 2 G/DL (ref 1.5–4.5)
GLUCOSE SERPL-MCNC: ABNORMAL MG/DL
HCT VFR BLD AUTO: 38.9 % (ref 34–46.6)
HGB BLD-MCNC: 13.1 G/DL (ref 11.1–15.9)
IMM GRANULOCYTES # BLD AUTO: 0 X10E3/UL (ref 0–0.1)
IMM GRANULOCYTES NFR BLD AUTO: 0 %
LYMPHOCYTES # BLD AUTO: 2 X10E3/UL (ref 0.7–3.1)
LYMPHOCYTES NFR BLD AUTO: 21 %
MCH RBC QN AUTO: 29.3 PG (ref 26.6–33)
MCHC RBC AUTO-ENTMCNC: 33.7 G/DL (ref 31.5–35.7)
MCV RBC AUTO: 87 FL (ref 79–97)
MONOCYTES # BLD AUTO: 1 X10E3/UL (ref 0.1–0.9)
MONOCYTES NFR BLD AUTO: 11 %
NEUTROPHILS # BLD AUTO: 6.2 X10E3/UL (ref 1.4–7)
NEUTROPHILS NFR BLD AUTO: 66 %
PLATELET # BLD AUTO: 228 X10E3/UL (ref 150–450)
POTASSIUM SERPL-SCNC: ABNORMAL MMOL/L
PROT SERPL-MCNC: 6.1 G/DL (ref 6–8.5)
RBC # BLD AUTO: 4.47 X10E6/UL (ref 3.77–5.28)
SODIUM SERPL-SCNC: 143 MMOL/L (ref 134–144)
TSH SERPL DL<=0.005 MIU/L-ACNC: 2.41 UIU/ML (ref 0.45–4.5)
VIT B12 SERPL-MCNC: >2000 PG/ML (ref 232–1245)
WBC # BLD AUTO: 9.4 X10E3/UL (ref 3.4–10.8)

## 2023-09-26 ENCOUNTER — OFFICE VISIT (OUTPATIENT)
Dept: CARDIOLOGY | Facility: CLINIC | Age: 88
End: 2023-09-26
Payer: MEDICARE

## 2023-09-26 VITALS
OXYGEN SATURATION: 98 % | SYSTOLIC BLOOD PRESSURE: 118 MMHG | HEIGHT: 61 IN | BODY MASS INDEX: 18.16 KG/M2 | HEART RATE: 103 BPM | WEIGHT: 96.2 LBS | DIASTOLIC BLOOD PRESSURE: 82 MMHG

## 2023-09-26 DIAGNOSIS — I48.21 PERMANENT ATRIAL FIBRILLATION: Primary | ICD-10-CM

## 2023-09-26 DIAGNOSIS — I10 ESSENTIAL HYPERTENSION: ICD-10-CM

## 2023-09-26 DIAGNOSIS — I34.0 NONRHEUMATIC MITRAL VALVE REGURGITATION: ICD-10-CM

## 2023-09-26 DIAGNOSIS — I35.1 NONRHEUMATIC AORTIC VALVE INSUFFICIENCY: ICD-10-CM

## 2023-09-26 DIAGNOSIS — I36.1 NONRHEUMATIC TRICUSPID VALVE REGURGITATION: ICD-10-CM

## 2023-09-26 PROCEDURE — 93000 ELECTROCARDIOGRAM COMPLETE: CPT | Performed by: INTERNAL MEDICINE

## 2023-09-26 PROCEDURE — 1160F RVW MEDS BY RX/DR IN RCRD: CPT | Performed by: INTERNAL MEDICINE

## 2023-09-26 PROCEDURE — 1159F MED LIST DOCD IN RCRD: CPT | Performed by: INTERNAL MEDICINE

## 2023-09-26 PROCEDURE — 99213 OFFICE O/P EST LOW 20 MIN: CPT | Performed by: INTERNAL MEDICINE

## 2023-09-26 NOTE — PROGRESS NOTES
Date of Office Visit: 2023  Encounter Provider: Kylah Jones MD  Place of Service: [facility] CARDIOLOGY  Patient Name: Keyana Nguyen  :1925      Patient ID:  Keyana Nguyen is a 89 y.o. female is here for  followup for atrial fibrillation.        History of Present Illness    She has a history of atrial fibrillation, venous incompetence, valvular insufficiency, history of breast cancer.     She moved to [place] for several years and moved  Back.  When she was in [place], she was  following with a cardiologist there, Dr. Buenrostro, who was with [facility].   She had had imaging studies done there. She had an echocardiogram done on 2015,  showing moderate to severe aortic insufficiency, moderate to severe mitral insufficiency,  aortic valvular sclerosis without stenosis, concentric left ventricular hypertrophy with  ejection fraction of 63%, and elevated right ventricular systolic pressure of 48 mmHg.   She had had a prior echocardiogram done on 10/01/2014, which was more normal in appearance  so she definitely had progression of her valvular heart disease. In addition, she had a  carotid duplex study done on 2014, showing less than 40% right internal carotid  artery stenosis and 20%-40% left internal carotid artery stenosis. She has a known  history of premature atrial complexes and a history of vertigo as well.     When she came in, she was in atrial fibrillation, which was new  Her [id number] score, she has a 3.2% annual risk of stroke.      She has a strong family history of stroke.  She lives in [facility] in [place] and has caregivers that come in round-the-clock to care for her there.  Guy is nonmedical.     She had a Holter recording done 2015 which showed underlying atrial  fibrillation with average heart rate of 83 beats per minute and no significant pauses.     She had an echo done 3/2016 showing  ejection fraction 62%, moderate left atrial enlargement, mild-to-moderate mitral insufficiency, mild aortic insufficiency, mild-to-moderate tricuspid insufficiency.  She had a normal carotid study done 3/2016.     April 2022 she fell and broke several left ribs. She was admitted to [facility] but was readmitted shortly after discharge for ESBL UTI. She was then discharged to rehab. Her anticoagulant was stopped during hospitalization due to falls.  July 2022 seen for leg swelling and amlodipine was stopped. August 2022 she continued to have swelling and was started on lasix. Her weight dropped to 85 lbs; lasix was reduced.    Labs in 9/18/2023 show unremarkable CMP, normal TSH, normal CBC.  She has a poor appetite but is trying to eat.  She does not drink very much water.  She has had no dizziness, syncope or falls.  She was getting some intermittent chest pain which is gotten better since she has been on Pepcid.  She has no orthopnea or PND.  She does use a walker.  Overall, she has been pretty stable.    Past Medical History:   Diagnosis Date    Aortic regurgitation     Atrial fibrillation     Breast cancer     Essential hypertension     Fatigue     Herpes zoster with complication 10/3/2016    Hypothyroidism     Irritable bowel syndrome     Mitral valve insufficiency     Nonrheumatic aortic (valve) insufficiency     Osteopenia of the elderly     Postmenopausal status     Vitamin B12 deficiency     Vitamin D deficiency          Past Surgical History:   Procedure Laterality Date    BREAST BIOPSY      BREAST SURGERY      HYSTERECTOMY      MASTECTOMY      OOPHORECTOMY      ORTHOPEDIC SURGERY      TONSILLECTOMY AND ADENOIDECTOMY      TOTAL KNEE ARTHROPLASTY         Current Outpatient Medications on File Prior to Visit   Medication Sig Dispense Refill    Acetaminophen (TYLENOL EXTRA STRENGTH PO) Take 1 tablet by mouth Daily As Needed.      bimatoprost (LUMIGAN) 0.01 % ophthalmic drops Administer 1 drop to both eyes  Every Night.      Biotin 22225 MCG tablet Take 1 tablet by mouth Daily.      calcium carbonate (OS-KAMAR) 600 MG tablet Take 1 tablet by mouth 2 (Two) Times a Day With Meals.      Cholecalciferol (Vitamin D3) 25 MCG (1000 UT) capsule 1 capsule.      Cranberry 500 MG capsule Take 500 mg by mouth Daily.      diphenoxylate-atropine (Lomotil) 2.5-0.025 MG per tablet Take 1 tablet by mouth Daily. 90 tablet 0    famotidine (PEPCID) 40 MG tablet Take 1 tablet by mouth every night at bedtime. 90 tablet 3    ferrous sulfate 325 (65 FE) MG tablet Take 1 tablet by mouth Daily With Breakfast.      furosemide (LASIX) 20 MG tablet Take 1 tablet by mouth Daily. 30 tablet 11    levothyroxine (SYNTHROID, LEVOTHROID) 25 MCG tablet Take 1 tablet by mouth Daily. 90 tablet 3    multivitamin with minerals tablet tablet Take 1 tablet by mouth Daily. 30 tablet 11    Nutritional Supplements (ENSURE HIGH PROTEIN PO) Take  by mouth.      polyethyl glycol-propyl glycol (SYSTANE) 0.4-0.3 % solution ophthalmic solution (artificial tears) Every 1 (One) Hour As Needed.      potassium chloride 10 MEQ CR tablet Take 2 tablets by mouth Daily. (Patient taking differently: Take 1 tablet by mouth Daily.) 60 tablet 6    tamsulosin (FLOMAX) 0.4 MG capsule 24 hr capsule TAKE 1 CAPSULE BY MOUTH DAILY 100 capsule 2    vitamin B-12 (CYANOCOBALAMIN) 1000 MCG tablet Take 1 tablet by mouth Daily. (Patient taking differently: Take 1 tablet by mouth Every Other Day.) 90 tablet 1     No current facility-administered medications on file prior to visit.       Social History     Socioeconomic History    Marital status:    Tobacco Use    Smoking status: Never    Smokeless tobacco: Never   Substance and Sexual Activity    Alcohol use: No     Comment: caffeine use: 6 cups daily     Drug use: No    Sexual activity: Never           ROS    Procedures    ECG 12 Lead    Date/Time: 9/26/2023 2:35 PM  Performed by: Kylah Jones MD  Authorized by: Robert  "Kylah BRICE MD   Comparison: compared with previous ECG   Similar to previous ECG  Rhythm: atrial fibrillation    Clinical impression: abnormal EKG            Objective:      Vitals:    09/26/23 1416   BP: 118/82   BP Location: Left arm   Patient Position: Sitting   Cuff Size: Adult   Pulse: 103   SpO2: 98%   Weight: 43.6 kg (96 lb 3.2 oz)   Height: 154.9 cm (61\")     Body mass index is 18.18 kg/m².    Vitals reviewed.   Constitutional:       General: Not in acute distress.     Appearance: Well-developed. Not diaphoretic.   Eyes:      General: No scleral icterus.     Conjunctiva/sclera: Conjunctivae normal.   HENT:      Head: Normocephalic and atraumatic.   Neck:      Thyroid: No thyromegaly.      Vascular: No carotid bruit or JVD.      Lymphadenopathy: No cervical adenopathy.   Pulmonary:      Effort: Pulmonary effort is normal. No respiratory distress.      Breath sounds: Normal breath sounds. No wheezing. No rhonchi. No rales.   Chest:      Chest wall: Not tender to palpatation.   Cardiovascular:      Normal rate. Irregularly irregular rhythm.      Murmurs: There is a grade 2/6 high frequency blowing holosystolic murmur at the apex.      No gallop.    Pulses:     Intact distal pulses.      Carotid: 2+ bilaterally.     Radial: 2+ bilaterally.     Dorsalis pedis: 2+ bilaterally.     Posterior tibial: 2+ bilaterally.  Edema:     Peripheral edema absent.   Abdominal:      General: Bowel sounds are normal. There is no distension or abdominal bruit.      Palpations: Abdomen is soft. There is no abdominal mass.      Tenderness: There is no abdominal tenderness.   Musculoskeletal:         General: No deformity.      Extremities: No clubbing present.     Cervical back: Neck supple. Skin:     General: Skin is warm and dry. There is no cyanosis.      Coloration: Skin is not pale.      Findings: No rash.   Neurological:      Mental Status: Alert and oriented to person, place, and time.      Cranial Nerves: No cranial nerve " deficit.   Psychiatric:         Judgment: Judgment normal.       Lab Review:       Assessment:      Diagnosis Plan   1. Permanent atrial fibrillation        2. Nonrheumatic mitral valve regurgitation        3. Nonrheumatic tricuspid valve regurgitation        4. Nonrheumatic aortic valve insufficiency        5. Essential hypertension          Atrial fibrillation.  Off Eliquis due to frequent falls-she has a [id number] score, which gives her a high annual risk of stroke at 3.2%.    Mild to moderate mitral insufficiency.    Mild aortic insufficiency.   Mild to moderate tricuspid insufficiency.    Urinary incontinence.    History of right breast cancer.    Chronic venous insufficiency.  She is elevating her legs and wearing compression stockings.    Weight loss.  She is trying to maintain her weight.  Strong family history of stroke.       Plan:       No medication changes, see Larissa in 6 months, overall doing well.  Advised that she continue to drink water and eat to maintain her weight.  I think she is stable.

## 2024-01-08 RX ORDER — POTASSIUM CHLORIDE 750 MG/1
20 TABLET, FILM COATED, EXTENDED RELEASE ORAL DAILY
Qty: 180 TABLET | Refills: 0 | Status: SHIPPED | OUTPATIENT
Start: 2024-01-08

## 2024-01-11 ENCOUNTER — OFFICE VISIT (OUTPATIENT)
Dept: FAMILY MEDICINE CLINIC | Facility: CLINIC | Age: 89
End: 2024-01-11
Payer: MEDICARE

## 2024-01-11 VITALS
HEART RATE: 82 BPM | WEIGHT: 98 LBS | SYSTOLIC BLOOD PRESSURE: 140 MMHG | OXYGEN SATURATION: 97 % | HEIGHT: 61 IN | DIASTOLIC BLOOD PRESSURE: 80 MMHG | BODY MASS INDEX: 18.5 KG/M2

## 2024-01-11 DIAGNOSIS — E03.9 ACQUIRED HYPOTHYROIDISM: ICD-10-CM

## 2024-01-11 DIAGNOSIS — E55.9 VITAMIN D DEFICIENCY: ICD-10-CM

## 2024-01-11 DIAGNOSIS — I10 ESSENTIAL HYPERTENSION: Primary | ICD-10-CM

## 2024-01-11 DIAGNOSIS — E53.8 B12 DEFICIENCY: ICD-10-CM

## 2024-01-11 DIAGNOSIS — K59.1 FUNCTIONAL DIARRHEA: ICD-10-CM

## 2024-01-11 PROCEDURE — 1159F MED LIST DOCD IN RCRD: CPT | Performed by: FAMILY MEDICINE

## 2024-01-11 PROCEDURE — 1160F RVW MEDS BY RX/DR IN RCRD: CPT | Performed by: FAMILY MEDICINE

## 2024-01-11 PROCEDURE — 99214 OFFICE O/P EST MOD 30 MIN: CPT | Performed by: FAMILY MEDICINE

## 2024-01-11 RX ORDER — DIPHENOXYLATE HYDROCHLORIDE AND ATROPINE SULFATE 2.5; .025 MG/1; MG/1
1 TABLET ORAL DAILY
Qty: 90 TABLET | Refills: 1 | Status: SHIPPED | OUTPATIENT
Start: 2024-01-11

## 2024-01-11 NOTE — PROGRESS NOTES
Date: 2024   Patient Name: Keyana Nguyen  : 1925   MRN: [id number]     Chief Complaint:    Chief Complaint   Patient presents with    Hypertension    Hypothyroidism       History of Present Illness: Keyana Nguyen is a 89 y.o. female who is here today to follow up for HTN, diarrhea, and hypothyroidism. BP is well controlled on current regimen.     She is due for repeat thyroid labs, denies any changes in symptoms.     She is taking Lomotil for chronic diarrhea and has been stable on this for more than 1 year. She denies side effects and requests refill.          Review of Systems:   Review of Systems   Constitutional:  Negative for chills, fatigue and fever.   HENT:  Positive for hearing loss.    Respiratory:  Negative for cough and shortness of breath.    Cardiovascular:  Negative for chest pain and palpitations.   Gastrointestinal:  Positive for diarrhea. Negative for abdominal pain, constipation, nausea and vomiting.   Musculoskeletal:  Negative for back pain and myalgias.   Neurological:  Negative for dizziness and headache.   Psychiatric/Behavioral:  Negative for depressed mood. The patient is not nervous/anxious.        Past Medical History:   Past Medical History:   Diagnosis Date    Aortic regurgitation     Atrial fibrillation     Breast cancer     Essential hypertension     Fatigue     Herpes zoster with complication 10/3/2016    Hypothyroidism     Irritable bowel syndrome     Mitral valve insufficiency     Nonrheumatic aortic (valve) insufficiency     Osteopenia of the elderly     Postmenopausal status     Vitamin B12 deficiency     Vitamin D deficiency        Past Surgical History:   Past Surgical History:   Procedure Laterality Date    BREAST BIOPSY      BREAST SURGERY      HYSTERECTOMY      MASTECTOMY      OOPHORECTOMY      ORTHOPEDIC SURGERY      TONSILLECTOMY AND ADENOIDECTOMY      TOTAL KNEE ARTHROPLASTY         Family History:   Family History   Problem Relation Age of  Onset    Heart failure Mother         congestive    Arthritis Mother     Hypertension Mother     Stroke Father     Diabetes Father     Hypertension Father     Arthritis Father     Diabetes Brother     Cancer Brother     Liver disease Brother     Cancer Sister     Cancer Brother        Social History:   Social History     Socioeconomic History    Marital status:    Tobacco Use    Smoking status: Never    Smokeless tobacco: Never   Vaping Use    Vaping Use: Never used   Substance and Sexual Activity    Alcohol use: No     Comment: caffeine use: 6 cups daily     Drug use: No    Sexual activity: Never       Medications:     Current Outpatient Medications:     Acetaminophen (TYLENOL EXTRA STRENGTH PO), Take 1 tablet by mouth Daily As Needed., Disp: , Rfl:     bimatoprost (LUMIGAN) 0.01 % ophthalmic drops, Administer 1 drop to both eyes Every Night., Disp: , Rfl:     Biotin 07841 MCG tablet, Take 1 tablet by mouth Daily., Disp: , Rfl:     calcium carbonate (OS-KAMAR) 600 MG tablet, Take 1 tablet by mouth 2 (Two) Times a Day With Meals., Disp: , Rfl:     Cholecalciferol (Vitamin D3) 25 MCG (1000 UT) capsule, Take 1 capsule by mouth As Needed., Disp: , Rfl:     Cranberry 500 MG capsule, Take 500 mg by mouth Daily., Disp: , Rfl:     diphenoxylate-atropine (Lomotil) 2.5-0.025 MG per tablet, Take 1 tablet by mouth Daily., Disp: 90 tablet, Rfl: 1    famotidine (PEPCID) 40 MG tablet, Take 1 tablet by mouth every night at bedtime., Disp: 90 tablet, Rfl: 3    ferrous sulfate 325 (65 FE) MG tablet, Take 1 tablet by mouth Daily With Breakfast., Disp: , Rfl:     furosemide (LASIX) 20 MG tablet, Take 1 tablet by mouth Daily., Disp: 30 tablet, Rfl: 11    levothyroxine (SYNTHROID, LEVOTHROID) 25 MCG tablet, Take 1 tablet by mouth Daily., Disp: 90 tablet, Rfl: 3    multivitamin with minerals tablet tablet, Take 1 tablet by mouth Daily., Disp: 30 tablet, Rfl: 11    Nutritional Supplements (ENSURE HIGH PROTEIN PO), Take  by mouth.,  "Disp: , Rfl:     polyethyl glycol-propyl glycol (SYSTANE) 0.4-0.3 % solution ophthalmic solution (artificial tears), Every 1 (One) Hour As Needed., Disp: , Rfl:     potassium chloride 10 MEQ CR tablet, Take 2 tablets by mouth Daily., Disp: 180 tablet, Rfl: 0    tamsulosin (FLOMAX) 0.4 MG capsule 24 hr capsule, TAKE 1 CAPSULE BY MOUTH DAILY, Disp: 100 capsule, Rfl: 2    vitamin B-12 (CYANOCOBALAMIN) 1000 MCG tablet, Take 1 tablet by mouth Daily. (Patient taking differently: Take 1 tablet by mouth Every Other Day.), Disp: 90 tablet, Rfl: 1    Allergies:   No Known Allergies    PHQ-2 Total Score: 0   PHQ-9 Total Score: 0     Physical Exam:  Vital Signs:   Vitals:    01/11/24 1354   BP: 140/80   Pulse: 82   SpO2: 97%   Weight: 44.5 kg (98 lb)   Height: 154.9 cm (61\")     Body mass index is 18.52 kg/m².     Physical Exam  Vitals and nursing note reviewed.   Constitutional:       Appearance: Normal appearance.   HENT:      Head: Normocephalic and atraumatic.      Right Ear: Decreased hearing noted.      Left Ear: Decreased hearing noted.   Cardiovascular:      Rate and Rhythm: Normal rate and regular rhythm.      Heart sounds: Normal heart sounds. No murmur heard.  Pulmonary:      Effort: Pulmonary effort is normal.      Breath sounds: Normal breath sounds. No wheezing.   Abdominal:      General: Bowel sounds are normal.      Palpations: Abdomen is soft.   Skin:     General: Skin is warm.   Neurological:      Mental Status: She is alert and oriented to person, place, and time. Mental status is at baseline.   Psychiatric:         Mood and Affect: Mood normal.         Behavior: Behavior normal.           Assessment/Plan:   Diagnoses and all orders for this visit:    1. Essential hypertension (Primary)  Assessment & Plan:  Hypertension is  stable .  Continue current treatment regimen.  Dietary sodium restriction.  Ambulatory blood pressure monitoring.  Blood pressure will be reassessed at the next regular " appointment.    Orders:  -     CBC Auto Differential; Future  -     Comprehensive Metabolic Panel; Future  -     CBC Auto Differential  -     Comprehensive Metabolic Panel    2. Functional diarrhea  Comments:  Improved with Lomotil  Assessment & Plan:  Ian reviewed, Lomotil refilled. F/u in 6 months.     Orders:  -     diphenoxylate-atropine (Lomotil) 2.5-0.025 MG per tablet; Take 1 tablet by mouth Daily.  Dispense: 90 tablet; Refill: 1    3. Acquired hypothyroidism  Assessment & Plan:  Stable, labs today    Orders:  -     TSH; Future  -     TSH    4. B12 deficiency  -     Vitamin B12; Future  -     Vitamin B12    5. Vitamin D deficiency  -     Vitamin D,25-Hydroxy; Future  -     Vitamin D,25-Hydroxy           Follow Up:   Return in about 6 months (around 7/11/2024) for Annual physical, Medicare Wellness.    Georgia Zavala, DO  [facility]

## 2024-01-12 LAB
25(OH)D3+25(OH)D2 SERPL-MCNC: 100 NG/ML (ref 30–100)
ALBUMIN SERPL-MCNC: 4 G/DL (ref 3.6–4.6)
ALBUMIN/GLOB SERPL: 1.7 {RATIO} (ref 1.2–2.2)
ALP SERPL-CCNC: 79 IU/L (ref 44–121)
ALT SERPL-CCNC: 17 IU/L (ref 0–32)
AST SERPL-CCNC: 16 IU/L (ref 0–40)
BASOPHILS # BLD AUTO: 0 X10E3/UL (ref 0–0.2)
BASOPHILS NFR BLD AUTO: 1 %
BILIRUB SERPL-MCNC: 0.3 MG/DL (ref 0–1.2)
BUN SERPL-MCNC: 22 MG/DL (ref 10–36)
BUN/CREAT SERPL: 26 (ref 12–28)
CALCIUM SERPL-MCNC: 9.3 MG/DL (ref 8.7–10.3)
CHLORIDE SERPL-SCNC: 109 MMOL/L (ref 96–106)
CO2 SERPL-SCNC: 20 MMOL/L (ref 20–29)
CREAT SERPL-MCNC: 0.84 MG/DL (ref 0.57–1)
EGFRCR SERPLBLD CKD-EPI 2021: 63 ML/MIN/1.73
EOSINOPHIL # BLD AUTO: 0.1 X10E3/UL (ref 0–0.4)
EOSINOPHIL NFR BLD AUTO: 1 %
ERYTHROCYTE [DISTWIDTH] IN BLOOD BY AUTOMATED COUNT: 12.4 % (ref 11.7–15.4)
GLOBULIN SER CALC-MCNC: 2.4 G/DL (ref 1.5–4.5)
GLUCOSE SERPL-MCNC: 78 MG/DL (ref 70–99)
HCT VFR BLD AUTO: 40.8 % (ref 34–46.6)
HGB BLD-MCNC: 13.9 G/DL (ref 11.1–15.9)
IMM GRANULOCYTES # BLD AUTO: 0 X10E3/UL (ref 0–0.1)
IMM GRANULOCYTES NFR BLD AUTO: 0 %
LYMPHOCYTES # BLD AUTO: 2 X10E3/UL (ref 0.7–3.1)
LYMPHOCYTES NFR BLD AUTO: 26 %
MCH RBC QN AUTO: 30 PG (ref 26.6–33)
MCHC RBC AUTO-ENTMCNC: 34.1 G/DL (ref 31.5–35.7)
MCV RBC AUTO: 88 FL (ref 79–97)
MONOCYTES # BLD AUTO: 0.8 X10E3/UL (ref 0.1–0.9)
MONOCYTES NFR BLD AUTO: 10 %
NEUTROPHILS # BLD AUTO: 4.8 X10E3/UL (ref 1.4–7)
NEUTROPHILS NFR BLD AUTO: 62 %
PLATELET # BLD AUTO: 247 X10E3/UL (ref 150–450)
POTASSIUM SERPL-SCNC: 4.5 MMOL/L (ref 3.5–5.2)
PROT SERPL-MCNC: 6.4 G/DL (ref 6–8.5)
RBC # BLD AUTO: 4.64 X10E6/UL (ref 3.77–5.28)
SODIUM SERPL-SCNC: 143 MMOL/L (ref 134–144)
TSH SERPL DL<=0.005 MIU/L-ACNC: 1.96 UIU/ML (ref 0.45–4.5)
VIT B12 SERPL-MCNC: 670 PG/ML (ref 232–1245)
WBC # BLD AUTO: 7.7 X10E3/UL (ref 3.4–10.8)

## 2024-01-12 NOTE — ASSESSMENT & PLAN NOTE
Hypertension is  stable .  Continue current treatment regimen.  Dietary sodium restriction.  Ambulatory blood pressure monitoring.  Blood pressure will be reassessed at the next regular appointment.

## 2024-01-24 DIAGNOSIS — R33.9 URINARY RETENTION: ICD-10-CM

## 2024-01-24 DIAGNOSIS — E03.9 ACQUIRED HYPOTHYROIDISM: ICD-10-CM

## 2024-01-24 RX ORDER — LEVOTHYROXINE SODIUM 0.03 MG/1
25 TABLET ORAL DAILY
Qty: 90 TABLET | Refills: 3 | Status: SHIPPED | OUTPATIENT
Start: 2024-01-24

## 2024-01-24 RX ORDER — TAMSULOSIN HYDROCHLORIDE 0.4 MG/1
1 CAPSULE ORAL DAILY
Qty: 100 CAPSULE | Refills: 2 | Status: SHIPPED | OUTPATIENT
Start: 2024-01-24

## 2024-01-24 RX ORDER — FAMOTIDINE 40 MG/1
40 TABLET, FILM COATED ORAL
Qty: 90 TABLET | Refills: 3 | Status: SHIPPED | OUTPATIENT
Start: 2024-01-24

## 2024-01-24 RX ORDER — FUROSEMIDE 20 MG/1
20 TABLET ORAL DAILY
Qty: 30 TABLET | Refills: 11 | Status: SHIPPED | OUTPATIENT
Start: 2024-01-24

## 2024-01-24 RX ORDER — POTASSIUM CHLORIDE 750 MG/1
20 TABLET, FILM COATED, EXTENDED RELEASE ORAL DAILY
Qty: 180 TABLET | Refills: 0 | Status: SHIPPED | OUTPATIENT
Start: 2024-01-24

## 2024-01-24 NOTE — TELEPHONE ENCOUNTER
Caller: Loraine Olivares    Relationship: Emergency Contact    Best call back number: [phone]     Requested Prescriptions:   Requested Prescriptions     Pending Prescriptions Disp Refills    potassium chloride 10 MEQ CR tablet 180 tablet 0     Sig: Take 2 tablets by mouth Daily.    tamsulosin (FLOMAX) 0.4 MG capsule 24 hr capsule 100 capsule 2     Sig: Take 1 capsule by mouth Daily.    levothyroxine (SYNTHROID, LEVOTHROID) 25 MCG tablet 90 tablet 3     Sig: Take 1 tablet by mouth Daily.    furosemide (LASIX) 20 MG tablet 30 tablet 11     Sig: Take 1 tablet by mouth Daily.    famotidine (PEPCID) 40 MG tablet 90 tablet 3     Sig: Take 1 tablet by mouth every night at bedtime.        Pharmacy where request should be sent: [facility], [address] [phone] [place] [phone] FX     Last office visit with prescribing clinician: 1/11/2024   Last telemedicine visit with prescribing clinician: Visit date not found   Next office visit with prescribing clinician: 7/15/2024     Additional details provided by patient: WAS SENT TO WRONG PHARMACY BEFORE     Does the patient have less than a 3 day supply:  [] Yes  [x] No    Would you like a call back once the refill request has been completed: [] Yes [x] No    If the office needs to give you a call back, can they leave a voicemail: [] Yes [x] No    Cruz Campos Rep   01/24/24 10:39 EST

## 2024-02-19 ENCOUNTER — TELEPHONE (OUTPATIENT)
Dept: FAMILY MEDICINE CLINIC | Facility: CLINIC | Age: 89
End: 2024-02-19
Payer: MEDICARE

## 2024-02-19 NOTE — TELEPHONE ENCOUNTER
Patient's daughter called, patient has broken hip and had surgery and is at . She said that they gave her a list of places that have accepted her and she said she needs some guidance on where to go. The options they gave her were, [facility], [facility], [facility], [facility]. She also said that she has heard good things about [facility] in [place] but it was not on the list they gave her and she would need to find out if she could go there. Patient's daughter is requesting call back.

## 2024-05-30 ENCOUNTER — APPOINTMENT (OUTPATIENT)
Dept: GENERAL RADIOLOGY | Facility: HOSPITAL | Age: 89
DRG: 078 | End: 2024-05-30
Payer: MEDICARE

## 2024-05-30 ENCOUNTER — APPOINTMENT (OUTPATIENT)
Dept: MRI IMAGING | Facility: HOSPITAL | Age: 89
DRG: 078 | End: 2024-05-30
Payer: MEDICARE

## 2024-05-30 ENCOUNTER — HOSPITAL ENCOUNTER (INPATIENT)
Facility: HOSPITAL | Age: 89
LOS: 4 days | Discharge: NURSING, REHAB OR LONG-TERM CARE | DRG: 078 | End: 2024-06-03
Attending: EMERGENCY MEDICINE | Admitting: FAMILY MEDICINE
Payer: MEDICARE

## 2024-05-30 ENCOUNTER — APPOINTMENT (OUTPATIENT)
Dept: CT IMAGING | Facility: HOSPITAL | Age: 89
DRG: 078 | End: 2024-05-30
Payer: MEDICARE

## 2024-05-30 DIAGNOSIS — R26.2 DIFFICULTY WALKING: ICD-10-CM

## 2024-05-30 DIAGNOSIS — Z78.9 DECREASED ACTIVITIES OF DAILY LIVING (ADL): ICD-10-CM
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
## 2024-05-30 DIAGNOSIS — T14.8XXA MULTIPLE SKIN TEARS: ICD-10-CM

## 2024-05-30 DIAGNOSIS — R41.82 ALTERED MENTAL STATUS, UNSPECIFIED ALTERED MENTAL STATUS TYPE: Primary | ICD-10-CM

## 2024-05-30 DIAGNOSIS — R13.12 OROPHARYNGEAL DYSPHAGIA: ICD-10-CM

## 2024-05-30 LAB
ALBUMIN SERPL-MCNC: 3.8 G/DL (ref 3.5–5.2)
ALBUMIN/GLOB SERPL: 1.3 G/DL
ALP SERPL-CCNC: 91 U/L (ref 39–117)
ALT SERPL W P-5'-P-CCNC: 68 U/L (ref 1–33)
ANION GAP SERPL CALCULATED.3IONS-SCNC: 9.2 MMOL/L (ref 5–15)
AST SERPL-CCNC: 94 U/L (ref 1–32)
BACTERIA UR QL AUTO: NORMAL /HPF
BASOPHILS # BLD AUTO: 0.05 10*3/MM3 (ref 0–0.2)
BASOPHILS NFR BLD AUTO: 0.5 % (ref 0–1.5)
BILIRUB SERPL-MCNC: 0.6 MG/DL (ref 0–1.2)
BILIRUB UR QL STRIP: NEGATIVE
BUN SERPL-MCNC: 10 MG/DL (ref 8–23)
BUN/CREAT SERPL: 14.1 (ref 7–25)
CALCIUM SPEC-SCNC: 8.8 MG/DL (ref 8.2–9.6)
CHLORIDE SERPL-SCNC: 104 MMOL/L (ref 98–107)
CK SERPL-CCNC: 54 U/L (ref 20–180)
CLARITY UR: CLEAR
CO2 SERPL-SCNC: 27.8 MMOL/L (ref 22–29)
COLOR UR: YELLOW
CREAT SERPL-MCNC: 0.71 MG/DL (ref 0.57–1)
D-LACTATE SERPL-SCNC: 1.6 MMOL/L (ref 0.5–2)
D-LACTATE SERPL-SCNC: 2.1 MMOL/L (ref 0.5–2)
DEPRECATED RDW RBC AUTO: 45.3 FL (ref 37–54)
EGFRCR SERPLBLD CKD-EPI 2021: 77 ML/MIN/1.73
EOSINOPHIL # BLD AUTO: 0.04 10*3/MM3 (ref 0–0.4)
EOSINOPHIL NFR BLD AUTO: 0.4 % (ref 0.3–6.2)
ERYTHROCYTE [DISTWIDTH] IN BLOOD BY AUTOMATED COUNT: 14.7 % (ref 12.3–15.4)
GLOBULIN UR ELPH-MCNC: 2.9 GM/DL
GLUCOSE SERPL-MCNC: 135 MG/DL (ref 65–99)
GLUCOSE UR STRIP-MCNC: NEGATIVE MG/DL
HCT VFR BLD AUTO: 39.5 % (ref 34–46.6)
HGB BLD-MCNC: 12.7 G/DL (ref 12–15.9)
HGB UR QL STRIP.AUTO: ABNORMAL
HOLD SPECIMEN: NORMAL
HOLD SPECIMEN: NORMAL
HYALINE CASTS UR QL AUTO: NORMAL /LPF
IMM GRANULOCYTES # BLD AUTO: 0.08 10*3/MM3 (ref 0–0.05)
IMM GRANULOCYTES NFR BLD AUTO: 0.8 % (ref 0–0.5)
KETONES UR QL STRIP: NEGATIVE
LEUKOCYTE ESTERASE UR QL STRIP.AUTO: NEGATIVE
LYMPHOCYTES # BLD AUTO: 1.1 10*3/MM3 (ref 0.7–3.1)
LYMPHOCYTES NFR BLD AUTO: 10.7 % (ref 19.6–45.3)
MCH RBC QN AUTO: 27.4 PG (ref 26.6–33)
MCHC RBC AUTO-ENTMCNC: 32.2 G/DL (ref 31.5–35.7)
MCV RBC AUTO: 85.1 FL (ref 79–97)
MONOCYTES # BLD AUTO: 0.75 10*3/MM3 (ref 0.1–0.9)
MONOCYTES NFR BLD AUTO: 7.3 % (ref 5–12)
NEUTROPHILS NFR BLD AUTO: 8.27 10*3/MM3 (ref 1.7–7)
NEUTROPHILS NFR BLD AUTO: 80.3 % (ref 42.7–76)
NITRITE UR QL STRIP: NEGATIVE
NRBC BLD AUTO-RTO: 0 /100 WBC (ref 0–0.2)
NT-PROBNP SERPL-MCNC: 2506 PG/ML (ref 0–1800)
PH UR STRIP.AUTO: 6.5 [PH] (ref 5–8)
PLATELET # BLD AUTO: 246 10*3/MM3 (ref 140–450)
PMV BLD AUTO: 9.8 FL (ref 6–12)
POTASSIUM SERPL-SCNC: 3.5 MMOL/L (ref 3.5–5.2)
PROT SERPL-MCNC: 6.7 G/DL (ref 6–8.5)
PROT UR QL STRIP: ABNORMAL
RBC # BLD AUTO: 4.64 10*6/MM3 (ref 3.77–5.28)
RBC # UR STRIP: NORMAL /HPF
REF LAB TEST METHOD: NORMAL
SODIUM SERPL-SCNC: 141 MMOL/L (ref 136–145)
SP GR UR STRIP: 1.02 (ref 1–1.03)
SQUAMOUS #/AREA URNS HPF: NORMAL /HPF
TSH SERPL DL<=0.05 MIU/L-ACNC: 1.48 UIU/ML (ref 0.27–4.2)
UROBILINOGEN UR QL STRIP: ABNORMAL
VIT B12 BLD-MCNC: 612 PG/ML (ref 211–946)
WBC # UR STRIP: NORMAL /HPF
WBC NRBC COR # BLD AUTO: 10.29 10*3/MM3 (ref 3.4–10.8)
WHOLE BLOOD HOLD COAG: NORMAL
WHOLE BLOOD HOLD SPECIMEN: NORMAL

## 2024-05-30 PROCEDURE — 87040 BLOOD CULTURE FOR BACTERIA: CPT | Performed by: EMERGENCY MEDICINE

## 2024-05-30 PROCEDURE — 80053 COMPREHEN METABOLIC PANEL: CPT | Performed by: EMERGENCY MEDICINE

## 2024-05-30 PROCEDURE — 73030 X-RAY EXAM OF SHOULDER: CPT

## 2024-05-30 PROCEDURE — 83605 ASSAY OF LACTIC ACID: CPT | Performed by: EMERGENCY MEDICINE

## 2024-05-30 PROCEDURE — 70553 MRI BRAIN STEM W/O & W/DYE: CPT

## 2024-05-30 PROCEDURE — 25010000002 ONDANSETRON PER 1 MG: Performed by: EMERGENCY MEDICINE

## 2024-05-30 PROCEDURE — 70450 CT HEAD/BRAIN W/O DYE: CPT

## 2024-05-30 PROCEDURE — 25010000002 MORPHINE PER 10 MG: Performed by: INTERNAL MEDICINE

## 2024-05-30 PROCEDURE — 73562 X-RAY EXAM OF KNEE 3: CPT

## 2024-05-30 PROCEDURE — 36415 COLL VENOUS BLD VENIPUNCTURE: CPT

## 2024-05-30 PROCEDURE — 99222 1ST HOSP IP/OBS MODERATE 55: CPT | Performed by: INTERNAL MEDICINE

## 2024-05-30 PROCEDURE — 73080 X-RAY EXAM OF ELBOW: CPT

## 2024-05-30 PROCEDURE — 73502 X-RAY EXAM HIP UNI 2-3 VIEWS: CPT

## 2024-05-30 PROCEDURE — 72125 CT NECK SPINE W/O DYE: CPT

## 2024-05-30 PROCEDURE — 0 GADOBENATE DIMEGLUMINE 529 MG/ML SOLUTION: Performed by: INTERNAL MEDICINE

## 2024-05-30 PROCEDURE — 25810000003 SODIUM CHLORIDE 0.9 % SOLUTION: Performed by: NURSE PRACTITIONER

## 2024-05-30 PROCEDURE — 81001 URINALYSIS AUTO W/SCOPE: CPT | Performed by: NURSE PRACTITIONER

## 2024-05-30 PROCEDURE — 82607 VITAMIN B-12: CPT | Performed by: INTERNAL MEDICINE

## 2024-05-30 PROCEDURE — 82550 ASSAY OF CK (CPK): CPT | Performed by: INTERNAL MEDICINE

## 2024-05-30 PROCEDURE — 99285 EMERGENCY DEPT VISIT HI MDM: CPT

## 2024-05-30 PROCEDURE — 25010000002 ENOXAPARIN PER 10 MG: Performed by: INTERNAL MEDICINE

## 2024-05-30 PROCEDURE — 83880 ASSAY OF NATRIURETIC PEPTIDE: CPT | Performed by: NURSE PRACTITIONER

## 2024-05-30 PROCEDURE — 71045 X-RAY EXAM CHEST 1 VIEW: CPT

## 2024-05-30 PROCEDURE — 85025 COMPLETE CBC W/AUTO DIFF WBC: CPT | Performed by: EMERGENCY MEDICINE

## 2024-05-30 PROCEDURE — A9577 INJ MULTIHANCE: HCPCS | Performed by: INTERNAL MEDICINE

## 2024-05-30 PROCEDURE — 84443 ASSAY THYROID STIM HORMONE: CPT | Performed by: INTERNAL MEDICINE

## 2024-05-30 PROCEDURE — 25010000002 MORPHINE PER 10 MG: Performed by: EMERGENCY MEDICINE

## 2024-05-30 RX ORDER — TRAMADOL HYDROCHLORIDE 50 MG/1
50 TABLET ORAL EVERY 6 HOURS PRN
Status: DISCONTINUED | OUTPATIENT
Start: 2024-05-30 | End: 2024-06-03 | Stop reason: HOSPADM

## 2024-05-30 RX ORDER — DEXTROSE MONOHYDRATE, SODIUM CHLORIDE, AND POTASSIUM CHLORIDE 50; 1.49; 4.5 G/1000ML; G/1000ML; G/1000ML
100 INJECTION, SOLUTION INTRAVENOUS CONTINUOUS
Status: DISCONTINUED | OUTPATIENT
Start: 2024-05-30 | End: 2024-05-31

## 2024-05-30 RX ORDER — MORPHINE SULFATE 2 MG/ML
2 INJECTION, SOLUTION INTRAMUSCULAR; INTRAVENOUS ONCE
Status: COMPLETED | OUTPATIENT
Start: 2024-05-30 | End: 2024-05-30

## 2024-05-30 RX ORDER — ACETAMINOPHEN 325 MG/1
325 TABLET ORAL EVERY 6 HOURS PRN
COMMUNITY

## 2024-05-30 RX ORDER — ONDANSETRON 2 MG/ML
4 INJECTION INTRAMUSCULAR; INTRAVENOUS EVERY 6 HOURS PRN
Status: DISCONTINUED | OUTPATIENT
Start: 2024-05-30 | End: 2024-06-03 | Stop reason: HOSPADM

## 2024-05-30 RX ORDER — TRAMADOL HYDROCHLORIDE 50 MG/1
25 TABLET ORAL EVERY 4 HOURS PRN
Status: DISCONTINUED | OUTPATIENT
Start: 2024-05-30 | End: 2024-06-03 | Stop reason: HOSPADM

## 2024-05-30 RX ORDER — ONDANSETRON 2 MG/ML
4 INJECTION INTRAMUSCULAR; INTRAVENOUS ONCE
Status: COMPLETED | OUTPATIENT
Start: 2024-05-30 | End: 2024-05-30

## 2024-05-30 RX ORDER — SENNOSIDES 8.6 MG
650 CAPSULE ORAL EVERY 6 HOURS PRN
COMMUNITY
End: 2024-06-03 | Stop reason: HOSPADM

## 2024-05-30 RX ORDER — ACETAMINOPHEN 325 MG/1
650 TABLET ORAL EVERY 4 HOURS PRN
Status: DISCONTINUED | OUTPATIENT
Start: 2024-05-30 | End: 2024-06-03 | Stop reason: HOSPADM

## 2024-05-30 RX ORDER — SODIUM CHLORIDE 0.9 % (FLUSH) 0.9 %
10 SYRINGE (ML) INJECTION AS NEEDED
Status: DISCONTINUED | OUTPATIENT
Start: 2024-05-30 | End: 2024-06-03 | Stop reason: HOSPADM

## 2024-05-30 RX ORDER — POTASSIUM CHLORIDE 20 MEQ/1
20 TABLET, EXTENDED RELEASE ORAL DAILY
COMMUNITY
End: 2024-06-03 | Stop reason: HOSPADM

## 2024-05-30 RX ORDER — ENOXAPARIN SODIUM 100 MG/ML
30 INJECTION SUBCUTANEOUS NIGHTLY
Status: DISCONTINUED | OUTPATIENT
Start: 2024-05-30 | End: 2024-06-03 | Stop reason: HOSPADM

## 2024-05-30 RX ORDER — LEVOTHYROXINE SODIUM 0.03 MG/1
25 TABLET ORAL
Status: DISCONTINUED | OUTPATIENT
Start: 2024-05-30 | End: 2024-06-03 | Stop reason: HOSPADM

## 2024-05-30 RX ORDER — TAMSULOSIN HYDROCHLORIDE 0.4 MG/1
0.4 CAPSULE ORAL DAILY
Status: DISCONTINUED | OUTPATIENT
Start: 2024-05-30 | End: 2024-06-03 | Stop reason: HOSPADM

## 2024-05-30 RX ORDER — LATANOPROST 50 UG/ML
1 SOLUTION/ DROPS OPHTHALMIC DAILY
Status: DISCONTINUED | OUTPATIENT
Start: 2024-05-30 | End: 2024-06-03 | Stop reason: HOSPADM

## 2024-05-30 RX ORDER — SODIUM CHLORIDE 0.9 % (FLUSH) 0.9 %
10 SYRINGE (ML) INJECTION EVERY 12 HOURS SCHEDULED
Status: DISCONTINUED | OUTPATIENT
Start: 2024-05-30 | End: 2024-06-03 | Stop reason: HOSPADM

## 2024-05-30 RX ORDER — SODIUM CHLORIDE 9 MG/ML
40 INJECTION, SOLUTION INTRAVENOUS AS NEEDED
Status: DISCONTINUED | OUTPATIENT
Start: 2024-05-30 | End: 2024-06-03 | Stop reason: HOSPADM

## 2024-05-30 RX ORDER — POTASSIUM CHLORIDE 20 MEQ/1
40 TABLET, EXTENDED RELEASE ORAL DAILY
COMMUNITY
End: 2024-06-03 | Stop reason: HOSPADM

## 2024-05-30 RX ORDER — MORPHINE SULFATE 2 MG/ML
0.5 INJECTION, SOLUTION INTRAMUSCULAR; INTRAVENOUS EVERY 4 HOURS PRN
Status: DISCONTINUED | OUTPATIENT
Start: 2024-05-30 | End: 2024-06-03 | Stop reason: HOSPADM

## 2024-05-30 RX ORDER — DRONABINOL 10 MG/1
10 CAPSULE ORAL EVERY OTHER DAY
COMMUNITY
End: 2024-06-03 | Stop reason: HOSPADM

## 2024-05-30 RX ORDER — NITROGLYCERIN 0.4 MG/1
0.4 TABLET SUBLINGUAL
Status: DISCONTINUED | OUTPATIENT
Start: 2024-05-30 | End: 2024-06-03 | Stop reason: HOSPADM

## 2024-05-30 RX ORDER — LATANOPROST 50 UG/ML
1 SOLUTION/ DROPS OPHTHALMIC DAILY
COMMUNITY

## 2024-05-30 RX ADMIN — TAMSULOSIN HYDROCHLORIDE 0.4 MG: 0.4 CAPSULE ORAL at 11:30

## 2024-05-30 RX ADMIN — METOPROLOL TARTRATE 25 MG: 25 TABLET, FILM COATED ORAL at 22:30

## 2024-05-30 RX ADMIN — ONDANSETRON 4 MG: 2 INJECTION INTRAMUSCULAR; INTRAVENOUS at 04:11

## 2024-05-30 RX ADMIN — MORPHINE SULFATE 0.5 MG: 2 INJECTION, SOLUTION INTRAMUSCULAR; INTRAVENOUS at 17:18

## 2024-05-30 RX ADMIN — ENOXAPARIN SODIUM 30 MG: 100 INJECTION SUBCUTANEOUS at 22:30

## 2024-05-30 RX ADMIN — MORPHINE SULFATE 2 MG: 2 INJECTION, SOLUTION INTRAMUSCULAR; INTRAVENOUS at 04:11

## 2024-05-30 RX ADMIN — SODIUM CHLORIDE 1000 ML: 9 INJECTION, SOLUTION INTRAVENOUS at 05:56

## 2024-05-30 RX ADMIN — GADOBENATE DIMEGLUMINE 8 ML: 529 INJECTION, SOLUTION INTRAVENOUS at 17:54

## 2024-05-30 RX ADMIN — POTASSIUM CHLORIDE, DEXTROSE MONOHYDRATE AND SODIUM CHLORIDE 100 ML/HR: 150; 5; 450 INJECTION, SOLUTION INTRAVENOUS at 11:13

## 2024-05-30 RX ADMIN — LATANOPROST 1 DROP: 50 SOLUTION OPHTHALMIC at 11:29

## 2024-05-30 RX ADMIN — METOPROLOL TARTRATE 25 MG: 25 TABLET, FILM COATED ORAL at 11:30

## 2024-05-30 RX ADMIN — LEVOTHYROXINE SODIUM 25 MCG: 0.03 TABLET ORAL at 11:30

## 2024-05-30 RX ADMIN — TRAMADOL HYDROCHLORIDE 50 MG: 50 TABLET ORAL at 16:11

## 2024-05-30 RX ADMIN — MORPHINE SULFATE 4 MG: 4 INJECTION, SOLUTION INTRAMUSCULAR; INTRAVENOUS at 05:56

## 2024-05-30 RX ADMIN — TRAMADOL HYDROCHLORIDE 50 MG: 50 TABLET ORAL at 22:48

## 2024-05-30 NOTE — ED TRIAGE NOTES
"Pt arrives to the ED via ems from [facility] with complaints of \"thrashing around in her room\".. per ems the nurse at the nursing home stated \"pt was playing on her phone and then all of a sudden threw herself in the floor and started thrashing herself around in the floor\" ems stated \"nurse said this happened about an hour ago\" pt has a skin tear on left shoulder and another skin tear on on left knee.. pt seems confused,  saying \"lord help me\"  "

## 2024-05-30 NOTE — Clinical Note
Level of Care: Telemetry [5]   Diagnosis: Altered mental state [898383]   Admitting Physician: EM MADDEN [[id number]]   Attending Physician: EM MADDEN [[id number]]

## 2024-05-30 NOTE — PAYOR COMM NOTE
"Keyana Nguyen (89 y.o. Female)     PATIENT INFORMATION  Name:  Keyana Nguyen  MRN#:     [id number]  :  1925         ADMISSION INFORMATION  CLASS: Inpatient   DOS:  24      CURRENT ATTENDING PROVIDER INFORMATION  Name/NPI: Fran Orozco MD [[id number]]  Phone:             Phone: [phone]      REQUESTING PROVIDER and RENDERING FACILITY  Name:  [facility]   NPI:  [id number]  TID:  [id number]  Address:      [facility] [place] [address]  Phone  [phone]      UTILIZATION REVIEW CONTACT INFORMATION  Phone:      [phone]  Fax:           [phone]      ADMISSION DIAGNOSIS  Altered mental state R41.82  Delirium R41.0  Multiple skin tears T14.8XXA  Fall, initial encounter [W19.XXXA]    ++++++++++++++++++++++++++++++++++++++++++++++++++++++++++++++++++++++++++++++++      Date of Birth   1925    Social Security Number       Address   [facility] [place] [place]  [place] [id number]    Home Phone   [phone]    MRN   [id number]       [facility]    Marital Status                               Admission Date   24    Admission Type   Emergency    Admitting Provider   Fran Orozco MD    Attending Provider   Fran Orozco MD    Department, Room/Bed   [address] AMBULATORY SERVICES, 380/1       Discharge Date       Discharge Disposition       Discharge Destination                                 Attending Provider: Fran Orozco MD    Allergies: Now Melatonin    Isolation: None   Infection: None   Code Status: No CPR    Ht: 154.9 cm (60.98\")   Wt: 43.1 kg (95 lb 0.3 oz)    Admission Cmt: None   Principal Problem: Altered mental state [R41.82]                   Active Insurance as of 2024       Primary Coverage       Payor Plan Insurance Group Employer/Plan Group    ANTH MEDICARE REPLACEMENT [id number]       Payor Plan Address Payor Plan Phone Number " "Payor Plan Fax Number Effective Dates    [address] [phone]  1/1/2024 - None Entered    [facility] [id number]         Subscriber Name Subscriber Birth Date Member ID       BERE GOODRICH 11/20/1925 [id number]                      Delirium [facility] Inpatient Care       Indications Met   Last updated by Mary Jane Schofield, RN on 5/30/2024 0952     Review Status Created By   Primary Completed Mary Jane Schofield RN      Criteria Review   Delirium [facility] Inpatient Care     Overall Determination: Indications Met     Criteria:  [×] Admission to Inpatient Level of Care for Adult ([facility] Level 6 - Medically Managed Residential Services, Composite Score 28 or more) is indicated due to  1 or more  of the following  [G] [H] [I] [J]:      [×] Delirium of uncertain etiology that has not responded to appropriate treatment in emergency department or urgent care setting          5/30/2024  9:52 AM              -- 5/30/2024  9:52 AM by Mary Jane Schofield, VANIA --                  Still disoriented despite IVF     Notes:  -- 5/30/2024  9:52 AM by Mary Jane Schofield, RN --      To ED from [facility] where pt has been s/p ORIF hip. Pt's baseline is living in assisted living & able to drive self places. Baseline is AAOx4.       Now confused. Staff report pt yelling, screaming, disoriented. Pt threw herself to the eusebia & began thrashing around. On arrival, pt confused. Repeatedly saying \"Lord help me\". Skin tears to L shoulder & L knee.       Difficult to assess. Pt is not following commands in ED.                   PMHx: A fib; HTN, IBS; Nonrheumatic aortic valve insufficiency. Mastectomy; Hyst. TKR. ORIF hip.                   ED results      BNP 2506.0.       UA: Neg.       ALT 68, AST 94.       CBC ok.       CT head: Neg      shoulder/knee/elbow xray: No FXs.                   Hip/pelvis: ORIF of the left hip. Degenerative disease in both hips. No dislocation. Age-indeterminate left inferior pubic ramus fracture. This could be assessed with CT if " indicated.      CXR: NAD. multiple left-sided rib fractures that are most likely chronic.                  In ED: Morphine IV X 2. Zofran IV. IV NS 1000ml bolus.                   Admit: Telemetry.       PT/OT.       MRI brain.       D5 1/2 w/20K @ 100/hr.       Morphine IV prn.       Lovenox SQ.       Zofran IV prn.                 History & Physical        Fran Orozco MD at 05/30/24 0815          Hospitalist H&P Note    Patient Identification:  Name: Keyana Nguyen  Age: 89 y.o.  MRN: [id number]                          Chief Complaint:  AMS    History of Present Illness:  89 y/o female with a h/o HTN, Hypothyroidism, recent L hip fx s/p repair in 02/2024 who presents with worsening confusion. Patient's daughter provides history as patient is unable to due to confusion. She states that patient fell in February and suffered L hip fracture. She had this repaired andhas been in and out of rehab over last couple of months and is currently in rehab here in [facility]. She suffered a fall this am which led them to send her here. Daughter states that over the last week patient has been more confused and saying random things that dont make any sense. No fevers or chills. No other infectious symptoms. States she is normally AAOx3 and had been improving since the surgery ion February.     Review of Systems:  UTO given mental status     Past Medical History:   Diagnosis Date    Aortic regurgitation     Atrial fibrillation     Breast cancer     Essential hypertension     Fatigue     Herpes zoster with complication 10/3/2016    Hypothyroidism     Irritable bowel syndrome     Mitral valve insufficiency     Nonrheumatic aortic (valve) insufficiency     Osteopenia of the elderly     Postmenopausal status     Vitamin B12 deficiency     Vitamin D deficiency        Past Surgical History:   Procedure Laterality Date    BREAST BIOPSY      BREAST SURGERY      HYSTERECTOMY      MASTECTOMY      OOPHORECTOMY      ORTHOPEDIC  SURGERY      TONSILLECTOMY AND ADENOIDECTOMY      TOTAL KNEE ARTHROPLASTY          Family History   Problem Relation Age of Onset    Heart failure Mother         congestive    Arthritis Mother     Hypertension Mother     Stroke Father     Diabetes Father     Hypertension Father     Arthritis Father     Diabetes Brother     Cancer Brother     Liver disease Brother     Cancer Sister     Cancer Brother         Social History     Tobacco Use    Smoking status: Never    Smokeless tobacco: Never   Substance Use Topics    Alcohol use: No     Comment: caffeine use: 6 cups daily         No Known Allergies    (Not in a hospital admission)      Objective:  Vitals:   Temp:  [97.6 °F (36.4 °C)] 97.6 °F (36.4 °C)  Heart Rate:  [76-96] 91  Resp:  [20] 20  BP: (152-210)/(108-122) 155/113    Physical Exam:  Gen: NAD, conversant.   Eyes: conjunctivae clear, moist mucous membranes  HENT: Atraumatic, oropharynx clear with moist mucous membranes  Neck: No lymphadenopathy, no thyromegaly  Resp: normal respiratory effort, CTAB  CV: RRR, no MRGs, no peripheral edema  GI: Soft, nontender, nondistended, (+) BS.  Psych: eyes opens, groans in pain, mumbles some  Skin: warm and dry on palpation. No rash or ulcers on inspection.  M/S: bilateral UE and bilateral LE muscle mass and tone WNL for age  Neuro: difficult to perform neuro exam as she is not following commands    Data Review:  I have personally reviewed and interpreted all labs and imaging as well as all outside records    Labs:  Lab Results (last 24 hours)       Procedure Component Value Units Date/Time    BNP [[id number]]  (Abnormal) Collected: 05/30/24 0404    Specimen: Blood Updated: 05/30/24 0537     proBNP 2,506.0 pg/mL     Narrative:      This assay is used as an aid in the diagnosis of individuals suspected of having heart failure. It can be used as an aid in the diagnosis of acute decompensated heart failure (ADHF) in patients presenting with signs and symptoms of ADHF to the  emergency department (ED). In addition, NT-proBNP of <300 pg/mL indicates ADHF is not likely.    Age Range Result Interpretation  NT-proBNP Concentration (pg/mL:      <50             Positive            >450                   Gray                 300-450                    Negative             <300    50-75           Positive            >900                  Gray                300-900                  Negative            <300      >75             Positive            >1800                  Gray                300-1800                  Negative            <300    Urinalysis, Microscopic Only - Straight Cath [[id number]] Collected: 05/30/24 0503    Specimen: Urine from Straight Cath Updated: 05/30/24 0534     RBC, UA None Seen /HPF      WBC, UA 0-2 /HPF      Comment: Urine culture not indicated.        Bacteria, UA None Seen /HPF      Squamous Epithelial Cells, UA 0-2 /HPF      Hyaline Casts, UA 0-2 /LPF      Methodology Automated Microscopy    Urinalysis With Culture If Indicated - Straight Cath [[id number]]  (Abnormal) Collected: 05/30/24 0503    Specimen: Urine from Straight Cath Updated: 05/30/24 0522     Color, UA Yellow     Appearance, UA Clear     pH, UA 6.5     Specific Gravity, UA 1.020     Glucose, UA Negative     Ketones, UA Negative     Bilirubin, UA Negative     Blood, UA Trace     Protein, UA Trace     Leuk Esterase, UA Negative     Nitrite, UA Negative     Urobilinogen, UA 0.2 E.U./dL    Narrative:      In absence of clinical symptoms, the presence of pyuria, bacteria, and/or nitrites on the urinalysis result does not correlate with infection.    Lactic Acid, Plasma [[id number]]  (Abnormal) Collected: 05/30/24 0404    Specimen: Blood Updated: 05/30/24 0445     Lactate 2.1 mmol/L     Comprehensive Metabolic Panel [[id number]]  (Abnormal) Collected: 05/30/24 0404    Specimen: Blood Updated: 05/30/24 0434     Glucose 135 mg/dL      BUN 10 mg/dL      Creatinine 0.71 mg/dL      Sodium 141 mmol/L      Potassium  3.5 mmol/L      Chloride 104 mmol/L      CO2 27.8 mmol/L      Calcium 8.8 mg/dL      Total Protein 6.7 g/dL      Albumin 3.8 g/dL      ALT (SGPT) 68 U/L      AST (SGOT) 94 U/L      Alkaline Phosphatase 91 U/L      Total Bilirubin 0.6 mg/dL      Globulin 2.9 gm/dL      A/G Ratio 1.3 g/dL      BUN/Creatinine Ratio 14.1     Anion Gap 9.2 mmol/L      eGFR 77.0 mL/min/1.73     Narrative:      GFR Normal >60  Chronic Kidney Disease <60  Kidney Failure <15    The GFR formula is only valid for adults with stable renal function between ages 18 and 70.    [place] Draw [[id number]] Collected: 05/30/24 0404    Specimen: Blood Updated: 05/30/24 0415    Narrative:      The following orders were created for panel order [place] Draw.  Procedure                               Abnormality         Status                     ---------                               -----------         ------                     Green Top (Gel)[[id number]]                                  Final result               Lavender Top[[id number]]                                     Final result               Gold Top - SST[[id number]]                                   Final result               Light Blue Top[[id number]]                                   Final result                 Please view results for these tests on the individual orders.    Green Top (Gel) [[id number]] Collected: 05/30/24 0404    Specimen: Blood Updated: 05/30/24 0415     Extra Tube Hold for add-ons.     Comment: Auto resulted.       Lavender Top [[id number]] Collected: 05/30/24 0404    Specimen: Blood Updated: 05/30/24 0415     Extra Tube hold for add-on     Comment: Auto resulted       Gold Top - SST [[id number]] Collected: 05/30/24 0404    Specimen: Blood Updated: 05/30/24 0415     Extra Tube Hold for add-ons.     Comment: Auto resulted.       Light Blue Top [[id number]] Collected: 05/30/24 0404    Specimen: Blood Updated: 05/30/24 0415     Extra Tube Hold for add-ons.     Comment: Auto resulted        CBC & Differential [[id number]]  (Abnormal) Collected: 05/30/24 0404    Specimen: Blood Updated: 05/30/24 0412    Narrative:      The following orders were created for panel order CBC & Differential.  Procedure                               Abnormality         Status                     ---------                               -----------         ------                     CBC Auto Differential[[id number]]        Abnormal            Final result                 Please view results for these tests on the individual orders.    CBC Auto Differential [[id number]]  (Abnormal) Collected: 05/30/24 0404    Specimen: Blood Updated: 05/30/24 0412     WBC 10.29 10*3/mm3      RBC 4.64 10*6/mm3      Hemoglobin 12.7 g/dL      Hematocrit 39.5 %      MCV 85.1 fL      MCH 27.4 pg      MCHC 32.2 g/dL      RDW 14.7 %      RDW-SD 45.3 fl      MPV 9.8 fL      Platelets 246 10*3/mm3      Neutrophil % 80.3 %      Lymphocyte % 10.7 %      Monocyte % 7.3 %      Eosinophil % 0.4 %      Basophil % 0.5 %      Immature Grans % 0.8 %      Neutrophils, Absolute 8.27 10*3/mm3      Lymphocytes, Absolute 1.10 10*3/mm3      Monocytes, Absolute 0.75 10*3/mm3      Eosinophils, Absolute 0.04 10*3/mm3      Basophils, Absolute 0.05 10*3/mm3      Immature Grans, Absolute 0.08 10*3/mm3      nRBC 0.0 /100 WBC     Blood Culture - Blood, Arm, Left [[id number]] Collected: 05/30/24 0404    Specimen: Blood from Arm, Left Updated: 05/30/24 0411    Blood Culture - Blood, Arm, Right [[id number]] Collected: 05/30/24 0404    Specimen: Blood from Arm, Right Updated: 05/30/24 0410            Imaging:  Imaging Results (Last 24 Hours)       Procedure Component Value Units Date/Time    CT Cervical Spine Without Contrast [[id number]] Collected: 05/30/24 0609     Updated: 05/30/24 0617    Narrative:      CT CERVICAL SPINE WO CONTRAST-     Date of Exam: 5/30/2024 5:26 AM     Indication: Neck injury after fall.     Comparison: None available.     Technique: Axial CT images were  obtained of the cervical spine without  contrast administration.  Reconstructed coronal and sagittal images were  also obtained. Automated exposure control and iterative construction  methods were used.     Findings:  Exam is degraded by suboptimal positioning and patient motion, despite  repeating the exam. Note is made of fibrotic changes in the lung apices.  There is ectasia of the mid aortic arch measuring about 3.6 cm.  Atherosclerotic disease is present in the arch and left carotid  arteries. There is diffuse osteopenia. There is grade 1 anterolisthesis  of C4 on C5 and C7 on T1. There is degenerative disc disease with disc  osteophyte complexes and loss of disc height most pronounced at C5-6 and  C6-7 and to a lesser degree at C4-5. There is advanced facet arthropathy  throughout the cervical spine. No acute cervical spine fracture is  identified.       Impression:      Technically degraded exam as above. There is advanced cervical  degenerative disease with osteopenia. No definite acute cervical spine  fracture.           Electronically Signed By-Dr. Mark Gordon MD On:5/30/2024 6:15 AM       CT Head Without Contrast [[id number]] Collected: 05/30/24 0559     Updated: 05/30/24 0608    Narrative:      CT HEAD WO CONTRAST-     HISTORY:  Mental status changes. Fall.     TECHNIQUE:  Axial unenhanced head CT with multiplanar reformats. Radiation dose  reduction techniques included automated exposure control or exposure  modulation based on body size.     COMPARISON:  12/26/2014     FINDINGS:  Ventricular size and configuration are normal. No acute infarct or  hemorrhage is identified. There are no masses. There is no skull  fracture. There is progressive generalized atrophy with chronic small  vessel ischemic disease in the white matter. 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  There also appears to be an old lacunar infarct in the right side of the  elsa.  Atherosclerotic calcifications are present in the carotid siphons.  There is a mild left mastoid effusion.       Impression:      Senescent changes, progressive since the prior exam. Mild left mastoid  effusion is present. No acute findings in the brain.           Electronically Signed By-Dr. Mark Gordon MD On:5/30/2024 6:06 AM       XR Shoulder 2+ View Left [[id number]] Collected: 05/30/24 0441     Updated: 05/30/24 0445    Narrative:      XR SHOULDER 2+ VW LEFT-: 5/30/2024 4:18 AM     INDICATION:  Pain after fall.     COMPARISON:  None available.     FINDINGS:  3 views of the left shoulder.   No fracture or dislocation. There is  mild AC joint and glenohumeral joint arthropathy. No AC joint  separation.  No foreign body.       Impression:      Mild degenerative disease. No acute findings.     Electronically Signed By-Dr. Mark Gordon MD On:5/30/2024 4:43 AM       XR Hip With or Without Pelvis 2 - 3 View Left [[id number]] Collected: 05/30/24 0438     Updated: 05/30/24 0443    Narrative:      XR HIP W OR WO PELVIS 2-3 VIEW LEFT-: 5/30/2024 4:18 AM     INDICATION:  Hip pain after fall.     COMPARISON:  None available.     FINDINGS:  AP pelvis and 2 view(s) of the left hip. No hip dislocation or  sacroiliac joint diastasis. No bone erosion or destruction. Patient is  status post ORIF of the left acetabulum with plates and screws. There is  degenerative joint space narrowing in both hips, left worse than right.  There is a left inferior pubic ramus fracture that is age indeterminate.       Impression:      ORIF of the left hip. Degenerative disease in both hips. No dislocation.  There is an age-indeterminate left inferior pubic ramus fracture. This  could be assessed with CT if indicated.     Electronically Signed By-Dr. Mark Gordon MD On:5/30/2024 4:41 AM       XR Chest 1 View [[id number]] Collected: 05/30/24 0437     Updated: 05/30/24 0440    Narrative:      AP PORTABLE CHEST     HISTORY:  Chest pain. Fall  today.     COMPARISON:  12/26/2014     TECHNIQUE:  AP portable chest x-ray.     FINDINGS:  Right shoulder arthroplasty is present. The heart is mildly enlarged.  There is diffuse atherosclerotic calcification in the aorta. There is  chronic interstitial scarring throughout both lungs with some biapical  pleural thickening. No definite acute infiltrates. No pneumothorax.  There are multiple left-sided rib fractures, most likely chronic.  Correlate with site of injury.       Impression:      Chronic changes in the lungs with no pneumothorax identified. There are  multiple left-sided rib fractures that are most likely chronic.  Correlate with site of injury.     Electronically Signed By-Dr. Mark Gordon MD On:5/30/2024 4:38 AM       XR Elbow 3+ View Left [[id number]] Collected: 05/30/24 0435     Updated: 05/30/24 0439    Narrative:      XR ELBOW 3+ VW LEFT-: 5/30/2024 4:18 AM     INDICATION:  Pain after fall. Skin tear.     COMPARISON:   None available.     FINDINGS:   3 views of the left elbow.  No fracture, dislocation, or effusion. No  bone erosion or destruction.  No foreign body. Patient is osteopenic. IV  cannula noted in the antecubital fossa.       Impression:      Negative left elbow.     Electronically Signed By-Dr. Mark Gordon MD On:5/30/2024 4:36 AM       XR Knee 3 View Left [[id number]] Collected: 05/30/24 0434     Updated: 05/30/24 0436    Narrative:      XR KNEE 3 VW LEFT-: 5/30/2024 4:18 AM     INDICATION:  Pain after fall.     COMPARISON:  None available.     FINDINGS:  3 view(s) of the left knee.  No fracture, dislocation, or effusion. No  bone erosion or destruction.  No foreign body. Patient is status post  arthroplasty. No evidence of hardware loosening. Atherosclerotic  calcifications are present.       Impression:      Arthroplasty changes. No acute findings.     Electronically Signed By-Dr. Mark Gordon MD On:5/30/2024 4:34 AM               Assessment:    Altered mental  state      Plan:  Encephalopathy  - CVA vs delirium vs other  - no e/o infectious process -- u/a uninfectious and CXR clear, WBC normal, afebrile  - check MRI brain if able to lay still long enough for this  - delirium precautions  - gentle IVF today as she appears dry  - pain control -- will try and minimize narcotics    Mechanical fall  - xrays neg for acute fracture  - pain control  - PT/OT eval in am    Hypothyroidism  - resume synthroid  - check TSH    Transaminitis  - check CK  - mild currently -- recheck in am    VHD  Chronic diastolic HF  - recent echo from earlier this year with mod AR and MR  - proBNP 2506  - hold home lasix for now as she appears dry -- gentle IVF      Maulik Orozco MD  Hospitalist Group  5/30/2024  08:15 EDT      Electronically signed by Fran Orozco MD at 05/30/24 0832       Current Facility-Administered Medications   Medication Dose Route Frequency Provider Last Rate Last Admin    acetaminophen (TYLENOL) tablet 650 mg  650 mg Oral Q4H PRN Fran Orozco MD        dextrose 5 % and sodium chloride 0.45 % with KCl 20 mEq/L infusion  100 mL/hr Intravenous Continuous Fran Orozco  mL/hr at 05/30/24 1113 100 mL/hr at 05/30/24 1113    Enoxaparin Sodium (LOVENOX) syringe 30 mg  30 mg Subcutaneous Nightly Fran Orozco MD        latanoprost (XALATAN) 0.005 % ophthalmic solution 1 drop  1 drop Both Eyes Daily Fran Orozco MD        levothyroxine (SYNTHROID, LEVOTHROID) tablet 25 mcg  25 mcg Oral Q AM Fran Orozco MD        metoprolol tartrate (LOPRESSOR) tablet 25 mg  25 mg Oral BID Fran Orozco MD        morphine injection 0.5 mg  0.5 mg Intravenous Q4H PRN Fran Orozco MD        nitroglycerin (NITROSTAT) SL tablet 0.4 mg  0.4 mg Sublingual Q5 Min PRN Fran Orozco MD        ondansetron (ZOFRAN) injection 4 mg  4 mg Intravenous Q6H PRN Fran Orozco MD        Pharmacy to Dose  enoxaparin (LOVENOX)   Does not apply Continuous PRN Fran Orozco MD        sodium chloride 0.9 % flush 10 mL  10 mL Intravenous Q12H Fran Orozco MD        sodium chloride 0.9 % flush 10 mL  10 mL Intravenous PRN Fran Orozco MD        sodium chloride 0.9 % infusion 40 mL  40 mL Intravenous PRN Fran Orozco MD        tamsulosin (FLOMAX) 24 hr capsule 0.4 mg  0.4 mg Oral Daily Fran Orozco MD        traMADol (ULTRAM) tablet 25 mg  25 mg Oral Q4H PRN Fran Orozco MD        Or    traMADol (ULTRAM) tablet 50 mg  50 mg Oral Q6H PRN Fran Orozco MD         Lab Results (last 24 hours)       Procedure Component Value Units Date/Time    STAT Lactic Acid, Reflex [[id number]]  (Normal) Collected: 05/30/24 0832    Specimen: Blood from Hand, Left Updated: 05/30/24 0902     Lactate 1.6 mmol/L     CK [[id number]]  (Normal) Collected: 05/30/24 0404    Specimen: Blood Updated: 05/30/24 0848     Creatine Kinase 54 U/L     TSH Rfx On Abnormal To Free T4 [[id number]]  (Normal) Collected: 05/30/24 0404    Specimen: Blood Updated: 05/30/24 0837     TSH 1.480 uIU/mL     Vitamin B12 [[id number]] Collected: 05/30/24 0404    Specimen: Blood Updated: 05/30/24 0816    BNP [[id number]]  (Abnormal) Collected: 05/30/24 0404    Specimen: Blood Updated: 05/30/24 0537     proBNP 2,506.0 pg/mL     Narrative:      This assay is used as an aid in the diagnosis of individuals suspected of having heart failure. It can be used as an aid in the diagnosis of acute decompensated heart failure (ADHF) in patients presenting with signs and symptoms of ADHF to the emergency department (ED). In addition, NT-proBNP of <300 pg/mL indicates ADHF is not likely.    Age Range Result Interpretation  NT-proBNP Concentration (pg/mL:      <50             Positive            >450                   Gray                 300-450                    Negative             <300    50-75           Positive             >900                  Gray                300-900                  Negative            <300      >75             Positive            >1800                  Gray                300-1800                  Negative            <300    Urinalysis, Microscopic Only - Straight Cath [[id number]] Collected: 05/30/24 0503    Specimen: Urine from Straight Cath Updated: 05/30/24 0534     RBC, UA None Seen /HPF      WBC, UA 0-2 /HPF      Comment: Urine culture not indicated.        Bacteria, UA None Seen /HPF      Squamous Epithelial Cells, UA 0-2 /HPF      Hyaline Casts, UA 0-2 /LPF      Methodology Automated Microscopy    Urinalysis With Culture If Indicated - Straight Cath [[id number]]  (Abnormal) Collected: 05/30/24 0503    Specimen: Urine from Straight Cath Updated: 05/30/24 0522     Color, UA Yellow     Appearance, UA Clear     pH, UA 6.5     Specific Gravity, UA 1.020     Glucose, UA Negative     Ketones, UA Negative     Bilirubin, UA Negative     Blood, UA Trace     Protein, UA Trace     Leuk Esterase, UA Negative     Nitrite, UA Negative     Urobilinogen, UA 0.2 E.U./dL    Narrative:      In absence of clinical symptoms, the presence of pyuria, bacteria, and/or nitrites on the urinalysis result does not correlate with infection.    Lactic Acid, Plasma [[id number]]  (Abnormal) Collected: 05/30/24 0404    Specimen: Blood Updated: 05/30/24 0445     Lactate 2.1 mmol/L     Comprehensive Metabolic Panel [[id number]]  (Abnormal) Collected: 05/30/24 0404    Specimen: Blood Updated: 05/30/24 0434     Glucose 135 mg/dL      BUN 10 mg/dL      Creatinine 0.71 mg/dL      Sodium 141 mmol/L      Potassium 3.5 mmol/L      Chloride 104 mmol/L      CO2 27.8 mmol/L      Calcium 8.8 mg/dL      Total Protein 6.7 g/dL      Albumin 3.8 g/dL      ALT (SGPT) 68 U/L      AST (SGOT) 94 U/L      Alkaline Phosphatase 91 U/L      Total Bilirubin 0.6 mg/dL      Globulin 2.9 gm/dL      A/G Ratio 1.3 g/dL      BUN/Creatinine Ratio 14.1     Anion Gap  9.2 mmol/L      eGFR 77.0 mL/min/1.73     Narrative:      GFR Normal >60  Chronic Kidney Disease <60  Kidney Failure <15    The GFR formula is only valid for adults with stable renal function between ages 18 and 70.    [place] Draw [[id number]] Collected: 05/30/24 0404    Specimen: Blood Updated: 05/30/24 0415    Narrative:      The following orders were created for panel order [facility] Draw.  Procedure                               Abnormality         Status                     ---------                               -----------         ------                     Green Top (Gel)[[id number]]                                  Final result               Lavender Top[[id number]]                                     Final result               Gold Top - SST[[id number]]                                   Final result               Light Blue Top[[id number]]                                   Final result                 Please view results for these tests on the individual orders.    Green Top (Gel) [[id number]] Collected: 05/30/24 0404    Specimen: Blood Updated: 05/30/24 0415     Extra Tube Hold for add-ons.     Comment: Auto resulted.       Lavender Top [[id number]] Collected: 05/30/24 0404    Specimen: Blood Updated: 05/30/24 0415     Extra Tube hold for add-on     Comment: Auto resulted       Gold Top - SST [[id number]] Collected: 05/30/24 0404    Specimen: Blood Updated: 05/30/24 0415     Extra Tube Hold for add-ons.     Comment: Auto resulted.       Light Blue Top [[id number]] Collected: 05/30/24 0404    Specimen: Blood Updated: 05/30/24 0415     Extra Tube Hold for add-ons.     Comment: Auto resulted       CBC & Differential [[id number]]  (Abnormal) Collected: 05/30/24 0404    Specimen: Blood Updated: 05/30/24 0412    Narrative:      The following orders were created for panel order CBC & Differential.  Procedure                               Abnormality         Status                     ---------                                -----------         ------                     CBC Auto Differential[[id number]]        Abnormal            Final result                 Please view results for these tests on the individual orders.    CBC Auto Differential [[id number]]  (Abnormal) Collected: 05/30/24 0404    Specimen: Blood Updated: 05/30/24 0412     WBC 10.29 10*3/mm3      RBC 4.64 10*6/mm3      Hemoglobin 12.7 g/dL      Hematocrit 39.5 %      MCV 85.1 fL      MCH 27.4 pg      MCHC 32.2 g/dL      RDW 14.7 %      RDW-SD 45.3 fl      MPV 9.8 fL      Platelets 246 10*3/mm3      Neutrophil % 80.3 %      Lymphocyte % 10.7 %      Monocyte % 7.3 %      Eosinophil % 0.4 %      Basophil % 0.5 %      Immature Grans % 0.8 %      Neutrophils, Absolute 8.27 10*3/mm3      Lymphocytes, Absolute 1.10 10*3/mm3      Monocytes, Absolute 0.75 10*3/mm3      Eosinophils, Absolute 0.04 10*3/mm3      Basophils, Absolute 0.05 10*3/mm3      Immature Grans, Absolute 0.08 10*3/mm3      nRBC 0.0 /100 WBC     Blood Culture - Blood, Arm, Left [[id number]] Collected: 05/30/24 0404    Specimen: Blood from Arm, Left Updated: 05/30/24 0411    Blood Culture - Blood, Arm, Right [[id number]] Collected: 05/30/24 0404    Specimen: Blood from Arm, Right Updated: 05/30/24 0410          Imaging Results (Last 24 Hours)       Procedure Component Value Units Date/Time    CT Cervical Spine Without Contrast [[id number]] Collected: 05/30/24 0609     Updated: 05/30/24 0617    Narrative:      CT CERVICAL SPINE WO CONTRAST-     Date of Exam: 5/30/2024 5:26 AM     Indication: Neck injury after fall.     Comparison: None available.     Technique: Axial CT images were obtained of the cervical spine without  contrast administration.  Reconstructed coronal and sagittal images were  also obtained. Automated exposure control and iterative construction  methods were used.     Findings:  Exam is degraded by suboptimal positioning and patient motion, despite  repeating the exam. Note is made of fibrotic changes  in the lung apices.  There is ectasia of the mid aortic arch measuring about 3.6 cm.  Atherosclerotic disease is present in the arch and left carotid  arteries. There is diffuse osteopenia. There is grade 1 anterolisthesis  of C4 on C5 and C7 on T1. There is degenerative disc disease with disc  osteophyte complexes and loss of disc height most pronounced at C5-6 and  C6-7 and to a lesser degree at C4-5. There is advanced facet arthropathy  throughout the cervical spine. No acute cervical spine fracture is  identified.       Impression:      Technically degraded exam as above. There is advanced cervical  degenerative disease with osteopenia. No definite acute cervical spine  fracture.           Electronically Signed By-Dr. Mark Gordon MD On:5/30/2024 6:15 AM       CT Head Without Contrast [[id number]] Collected: 05/30/24 0559     Updated: 05/30/24 0608    Narrative:      CT HEAD WO CONTRAST-     HISTORY:  Mental status changes. Fall.     TECHNIQUE:  Axial unenhanced head CT with multiplanar reformats. Radiation dose  reduction techniques included automated exposure control or exposure  modulation based on body size.     COMPARISON:  12/26/2014     FINDINGS:  Ventricular size and configuration are normal. No acute infarct or  hemorrhage is identified. There are no masses. There is no skull  fracture. There is progressive generalized atrophy with chronic small  vessel ischemic disease in the white matter. 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  There also appears to be an old lacunar infarct in the right side of the  elsa. Atherosclerotic calcifications are present in the carotid siphons.  There is a mild left mastoid effusion.       Impression:      Senescent changes, progressive since the prior exam. Mild left mastoid  effusion is present. No acute findings in the brain.           Electronically Signed By-Dr. Mark Gordon MD On:5/30/2024 6:06 AM       XR  Shoulder 2+ View Left [[id number]] Collected: 05/30/24 0441     Updated: 05/30/24 0445    Narrative:      XR SHOULDER 2+ VW LEFT-: 5/30/2024 4:18 AM     INDICATION:  Pain after fall.     COMPARISON:  None available.     FINDINGS:  3 views of the left shoulder.   No fracture or dislocation. There is  mild AC joint and glenohumeral joint arthropathy. No AC joint  separation.  No foreign body.       Impression:      Mild degenerative disease. No acute findings.     Electronically Signed By-Dr. Mark Gordon MD On:5/30/2024 4:43 AM       XR Hip With or Without Pelvis 2 - 3 View Left [[id number]] Collected: 05/30/24 0438     Updated: 05/30/24 0443    Narrative:      XR HIP W OR WO PELVIS 2-3 VIEW LEFT-: 5/30/2024 4:18 AM     INDICATION:  Hip pain after fall.     COMPARISON:  None available.     FINDINGS:  AP pelvis and 2 view(s) of the left hip. No hip dislocation or  sacroiliac joint diastasis. No bone erosion or destruction. Patient is  status post ORIF of the left acetabulum with plates and screws. There is  degenerative joint space narrowing in both hips, left worse than right.  There is a left inferior pubic ramus fracture that is age indeterminate.       Impression:      ORIF of the left hip. Degenerative disease in both hips. No dislocation.  There is an age-indeterminate left inferior pubic ramus fracture. This  could be assessed with CT if indicated.     Electronically Signed By-Dr. Mark Gordon MD On:5/30/2024 4:41 AM       XR Chest 1 View [[id number]] Collected: 05/30/24 0437     Updated: 05/30/24 0440    Narrative:      AP PORTABLE CHEST     HISTORY:  Chest pain. Fall today.     COMPARISON:  12/26/2014     TECHNIQUE:  AP portable chest x-ray.     FINDINGS:  Right shoulder arthroplasty is present. The heart is mildly enlarged.  There is diffuse atherosclerotic calcification in the aorta. There is  chronic interstitial scarring throughout both lungs with some biapical  pleural thickening. No definite acute  infiltrates. No pneumothorax.  There are multiple left-sided rib fractures, most likely chronic.  Correlate with site of injury.       Impression:      Chronic changes in the lungs with no pneumothorax identified. There are  multiple left-sided rib fractures that are most likely chronic.  Correlate with site of injury.     Electronically Signed By-Dr. Mark Gordon MD On:5/30/2024 4:38 AM       XR Elbow 3+ View Left [[id number]] Collected: 05/30/24 0435     Updated: 05/30/24 0439    Narrative:      XR ELBOW 3+ VW LEFT-: 5/30/2024 4:18 AM     INDICATION:  Pain after fall. Skin tear.     COMPARISON:   None available.     FINDINGS:   3 views of the left elbow.  No fracture, dislocation, or effusion. No  bone erosion or destruction.  No foreign body. Patient is osteopenic. IV  cannula noted in the antecubital fossa.       Impression:      Negative left elbow.     Electronically Signed By-Dr. Mark Gordon MD On:5/30/2024 4:36 AM       XR Knee 3 View Left [[id number]] Collected: 05/30/24 0434     Updated: 05/30/24 0436    Narrative:      XR KNEE 3 VW LEFT-: 5/30/2024 4:18 AM     INDICATION:  Pain after fall.     COMPARISON:  None available.     FINDINGS:  3 view(s) of the left knee.  No fracture, dislocation, or effusion. No  bone erosion or destruction.  No foreign body. Patient is status post  arthroplasty. No evidence of hardware loosening. Atherosclerotic  calcifications are present.       Impression:      Arthroplasty changes. No acute findings.     Electronically Signed By-Dr. Mark Gordon MD On:5/30/2024 4:34 AM             Orders (last 24 hrs)        Start     Ordered    05/31/24 0600  Comprehensive Metabolic Panel  Morning Draw         05/30/24 0830    05/31/24 0600  Magnesium  Morning Draw         05/30/24 0830    05/31/24 0600  Phosphorus  Morning Draw         05/30/24 0830    05/30/24 2100  Enoxaparin Sodium (LOVENOX) syringe 30 mg  Nightly         05/30/24 1027    05/30/24 1800  Oral Care  2 Times Daily        05/30/24 1022    05/30/24 1200  Vital Signs  Every 4 Hours       05/30/24 1022    05/30/24 1115  latanoprost (XALATAN) 0.005 % ophthalmic solution 1 drop  Daily         05/30/24 1022    05/30/24 1115  metoprolol tartrate (LOPRESSOR) tablet 25 mg  2 Times Daily         05/30/24 1022    05/30/24 1115  tamsulosin (FLOMAX) 24 hr capsule 0.4 mg  Daily         05/30/24 1022    05/30/24 1115  sodium chloride 0.9 % flush 10 mL  Every 12 Hours Scheduled         05/30/24 1022    05/30/24 1030  levothyroxine (SYNTHROID, LEVOTHROID) tablet 25 mcg  Every Early Morning         05/30/24 1022    05/30/24 1023  Intake & Output  Every Shift       05/30/24 1022    05/30/24 1023  Weigh Patient  Once         05/30/24 1022    05/30/24 1023  Insert Peripheral IV  Once         05/30/24 1022    05/30/24 1023  Saline Lock & Maintain IV Access  Continuous,   Status:  Canceled         05/30/24 1022    05/30/24 1023  Continuous Cardiac Monitoring  Continuous        Comments: Follow Standing Orders As Outlined in Process Instructions (Open Order Report to View Full Instructions)    05/30/24 1022    05/30/24 1023  Maintain IV Access  Continuous         05/30/24 1022    05/30/24 1023  Telemetry - Place Orders & Notify Provider of Results When Patient Experiences Acute Chest Pain, Dysrhythmia or Respiratory Distress  Continuous        Comments: Open Order Report to View Parameters Requiring Provider Notification    05/30/24 1022    05/30/24 1023  Diet: Regular/House; Fluid Consistency: Thin (IDDSI 0)  Diet Effective Now         05/30/24 1022    05/30/24 1023  Bowel Regimen Not Indicated  Once         05/30/24 1022    05/30/24 1022  sodium chloride 0.9 % flush 10 mL  As Needed         05/30/24 1022    05/30/24 1022  sodium chloride 0.9 % infusion 40 mL  As Needed         05/30/24 1022    05/30/24 1022  Pharmacy to Dose enoxaparin (LOVENOX)  Continuous PRN         05/30/24 1022    05/30/24 1022  nitroglycerin (NITROSTAT) SL tablet 0.4 mg  Every  "5 Minutes PRN         05/30/24 1022    05/30/24 1022  ondansetron (ZOFRAN) injection 4 mg  Every 6 Hours PRN         05/30/24 1022    05/30/24 1022  acetaminophen (TYLENOL) tablet 650 mg  Every 4 Hours PRN         05/30/24 1022    05/30/24 1000  Inpatient Admission  Once         05/30/24 0959    05/30/24 0845  dextrose 5 % and sodium chloride 0.45 % with KCl 20 mEq/L infusion  Continuous         05/30/24 0829    05/30/24 0834  Code Status and Medical Interventions:  Continuous         05/30/24 0833    05/30/24 0833  PT Consult: Eval & Treat Discharge Placement Assessment  Once         05/30/24 0832    05/30/24 0833  OT Consult: Eval & Treat Discharge Placement Assessment  Once         05/30/24 0832    05/30/24 0832  CK  STAT         05/30/24 0831    05/30/24 0827  morphine injection 0.5 mg  Every 4 Hours PRN         05/30/24 0828    05/30/24 0827  traMADol (ULTRAM) tablet 25 mg  Every 4 Hours PRN        Placed in \"Or\" Linked Group    05/30/24 0828    05/30/24 0827  traMADol (ULTRAM) tablet 50 mg  Every 6 Hours PRN        Placed in \"Or\" Linked Group    05/30/24 0828    05/30/24 0816  Vitamin B12  STAT         05/30/24 0815    05/30/24 0815  MRI Brain With & Without Contrast  1 Time Imaging         05/30/24 0815    05/30/24 0815  TSH Rfx On Abnormal To Free T4  STAT         05/30/24 0815    05/30/24 0814  Initiate Observation Status  Once         05/30/24 0814    05/30/24 0704  STAT Lactic Acid, Reflex  PROCEDURE ONCE         05/30/24 0445    05/30/24 0640  Please clean and dress skin tears.  Nursing Communication  Once        Comments: Please clean and dress skin tears.    05/30/24 0639    05/30/24 0624  Inpatient Hospitalist Consult  Once        Specialty:  Hospitalist  Provider:  Fran Orozco MD    05/30/24 0623    05/30/24 0615  morphine injection 4 mg  Once         05/30/24 0547    05/30/24 0547  Monitor Blood Pressure  Per Hospital Policy         05/30/24 0547    05/30/24 0547  Cardiac Monitoring  " Continuous,   Status:  Canceled        Comments: Follow Standing Orders As Outlined in Process Instructions (Open Order Report to View Full Instructions)    05/30/24 0547    05/30/24 0547  Continuous Pulse Oximetry  Continuous         05/30/24 0547    05/30/24 0547  Manual Blood Pressure - Notify provider of result  Once         05/30/24 0547    05/30/24 0547  PLEASE UNWRAP WOUNDS AND LET ME KNOW SO I CAN LOOK AT TOO, THX  Nursing Communication  Once        Comments: PLEASE UNWRAP WOUNDS AND LET ME KNOW SO I CAN LOOK AT TOO, THX    05/30/24 0547    05/30/24 0545  sodium chloride 0.9 % bolus 1,000 mL  Once         05/30/24 0521    05/30/24 0522  BNP  STAT         05/30/24 0521    05/30/24 0518  Urinalysis, Microscopic Only - Straight Cath  Once         05/30/24 0517    05/30/24 0430  morphine injection 2 mg  Once         05/30/24 0404    05/30/24 0430  ondansetron (ZOFRAN) injection 4 mg  Once         05/30/24 0404    05/30/24 0411  Urinalysis With Culture If Indicated - Straight Cath  STAT         05/30/24 0410    05/30/24 0404  XR Knee 3 View Left  1 Time Imaging         05/30/24 0404    05/30/24 0404  XR Chest 1 View  1 Time Imaging         05/30/24 0404    05/30/24 0403  XR Hip With or Without Pelvis 2 - 3 View Left  1 Time Imaging         05/30/24 0404    05/30/24 0402  CT Cervical Spine Without Contrast  1 Time Imaging         05/30/24 0404    05/30/24 0402  XR Shoulder 2+ View Left  1 Time Imaging         05/30/24 0404    05/30/24 0402  XR Elbow 3+ View Left  1 Time Imaging         05/30/24 0404    05/30/24 0336  Manual Blood Pressure - Notify provider of result  Once         05/30/24 0339    05/30/24 0319  Blood Culture - Blood, Arm, Left  Once         05/30/24 0318    05/30/24 0319  Blood Culture - Blood, Arm, Right  Once         05/30/24 0318    05/30/24 0318  Lactic Acid, Plasma  Once         05/30/24 0318    05/30/24 0318  CT Head Without Contrast  1 Time Imaging         05/30/24 0318 05/30/24 0317   [place] Draw  Once         05/30/24 0318 05/30/24 0317  CBC & Differential  Once         05/30/24 0318 05/30/24 0317  Comprehensive Metabolic Panel  Once         05/30/24 0318 05/30/24 0317  Urinalysis With Microscopic If Indicated (No Culture) - Urine, Clean Catch  Once,   Status:  Canceled         05/30/24 0318 05/30/24 0317  Green Top (Gel)  PROCEDURE ONCE         05/30/24 0318 05/30/24 0317  Lavender Top  PROCEDURE ONCE         05/30/24 0318 05/30/24 0317  Gold Top - SST  PROCEDURE ONCE         05/30/24 0318 05/30/24 0317  Light Blue Top  PROCEDURE ONCE         05/30/24 0318 05/30/24 0317  CBC Auto Differential  PROCEDURE ONCE         05/30/24 0318 03/13/24 0000  metoprolol tartrate (LOPRESSOR) 25 MG tablet  2 Times Daily         05/30/24 0700    Unscheduled  Up With Assistance  As Needed       05/30/24 1022    --  acetaminophen (TYLENOL) 325 MG tablet  Every 6 Hours PRN         05/30/24 0649    --  acetaminophen (TYLENOL) 650 MG 8 hr tablet  Every 6 Hours PRN         05/30/24 0649    --  potassium chloride (Klor-Con M20) 20 MEQ CR tablet  Daily         05/30/24 0659    --  potassium chloride (KLOR-CON M20) 20 MEQ CR tablet  Daily         05/30/24 0659    --  dronabinol (MARINOL) 10 MG capsule  Every Other Day         05/30/24 0702    --  latanoprost (XALATAN) 0.005 % ophthalmic solution  Daily         05/30/24 0702

## 2024-05-30 NOTE — H&P
Hospitalist H&P Note    Patient Identification:  Name: Keyana Nguyen  Age: 89 y.o.  MRN: [id number]                          Chief Complaint:  AMS    History of Present Illness:  89 y/o female with a h/o HTN, Hypothyroidism, recent L hip fx s/p repair in 02/2024 who presents with worsening confusion. Patient's daughter provides history as patient is unable to due to confusion. She states that patient fell in February and suffered L hip fracture. She had this repaired andhas been in and out of rehab over last couple of months and is currently in rehab here in [facility]. She suffered a fall this am which led them to send her here. Daughter states that over the last week patient has been more confused and saying random things that dont make any sense. No fevers or chills. No other infectious symptoms. States she is normally AAOx3 and had been improving since the surgery ion February.     Review of Systems:  UTO given mental status     Past Medical History:   Diagnosis Date    Aortic regurgitation     Atrial fibrillation     Breast cancer     Essential hypertension     Fatigue     Herpes zoster with complication 10/3/2016    Hypothyroidism     Irritable bowel syndrome     Mitral valve insufficiency     Nonrheumatic aortic (valve) insufficiency     Osteopenia of the elderly     Postmenopausal status     Vitamin B12 deficiency     Vitamin D deficiency        Past Surgical History:   Procedure Laterality Date    BREAST BIOPSY      BREAST SURGERY      HYSTERECTOMY      MASTECTOMY      OOPHORECTOMY      ORTHOPEDIC SURGERY      TONSILLECTOMY AND ADENOIDECTOMY      TOTAL KNEE ARTHROPLASTY          Family History   Problem Relation Age of Onset    Heart failure Mother         congestive    Arthritis Mother     Hypertension Mother     Stroke Father     Diabetes Father     Hypertension Father     Arthritis Father     Diabetes Brother     Cancer Brother     Liver disease Brother     Cancer Sister     Cancer Brother         Social  History     Tobacco Use    Smoking status: Never    Smokeless tobacco: Never   Substance Use Topics    Alcohol use: No     Comment: caffeine use: 6 cups daily         No Known Allergies    (Not in a hospital admission)      Objective:  Vitals:   Temp:  [97.6 °F (36.4 °C)] 97.6 °F (36.4 °C)  Heart Rate:  [76-96] 91  Resp:  [20] 20  BP: (152-210)/(108-122) 155/113    Physical Exam:  Gen: NAD, conversant.   Eyes: conjunctivae clear, moist mucous membranes  HENT: Atraumatic, oropharynx clear with moist mucous membranes  Neck: No lymphadenopathy, no thyromegaly  Resp: normal respiratory effort, CTAB  CV: RRR, no MRGs, no peripheral edema  GI: Soft, nontender, nondistended, (+) BS.  Psych: eyes opens, groans in pain, mumbles some  Skin: warm and dry on palpation. No rash or ulcers on inspection.  M/S: bilateral UE and bilateral LE muscle mass and tone WNL for age  Neuro: difficult to perform neuro exam as she is not following commands    Data Review:  I have personally reviewed and interpreted all labs and imaging as well as all outside records    Labs:  Lab Results (last 24 hours)       Procedure Component Value Units Date/Time    BNP [[id number]]  (Abnormal) Collected: 05/30/24 0404    Specimen: Blood Updated: 05/30/24 0537     proBNP 2,506.0 pg/mL     Narrative:      This assay is used as an aid in the diagnosis of individuals suspected of having heart failure. It can be used as an aid in the diagnosis of acute decompensated heart failure (ADHF) in patients presenting with signs and symptoms of ADHF to the emergency department (ED). In addition, NT-proBNP of <300 pg/mL indicates ADHF is not likely.    Age Range Result Interpretation  NT-proBNP Concentration (pg/mL:      <50             Positive            >450                   Gray                 300-450                    Negative             <300    50-75           Positive            >900                  Gray                300-900                  Negative             <300      >75             Positive            >1800                  Gray                300-1800                  Negative            <300    Urinalysis, Microscopic Only - Straight Cath [[id number]] Collected: 05/30/24 0503    Specimen: Urine from Straight Cath Updated: 05/30/24 0534     RBC, UA None Seen /HPF      WBC, UA 0-2 /HPF      Comment: Urine culture not indicated.        Bacteria, UA None Seen /HPF      Squamous Epithelial Cells, UA 0-2 /HPF      Hyaline Casts, UA 0-2 /LPF      Methodology Automated Microscopy    Urinalysis With Culture If Indicated - Straight Cath [[id number]]  (Abnormal) Collected: 05/30/24 0503    Specimen: Urine from Straight Cath Updated: 05/30/24 0522     Color, UA Yellow     Appearance, UA Clear     pH, UA 6.5     Specific Gravity, UA 1.020     Glucose, UA Negative     Ketones, UA Negative     Bilirubin, UA Negative     Blood, UA Trace     Protein, UA Trace     Leuk Esterase, UA Negative     Nitrite, UA Negative     Urobilinogen, UA 0.2 E.U./dL    Narrative:      In absence of clinical symptoms, the presence of pyuria, bacteria, and/or nitrites on the urinalysis result does not correlate with infection.    Lactic Acid, Plasma [[id number]]  (Abnormal) Collected: 05/30/24 0404    Specimen: Blood Updated: 05/30/24 0445     Lactate 2.1 mmol/L     Comprehensive Metabolic Panel [[id number]]  (Abnormal) Collected: 05/30/24 0404    Specimen: Blood Updated: 05/30/24 0434     Glucose 135 mg/dL      BUN 10 mg/dL      Creatinine 0.71 mg/dL      Sodium 141 mmol/L      Potassium 3.5 mmol/L      Chloride 104 mmol/L      CO2 27.8 mmol/L      Calcium 8.8 mg/dL      Total Protein 6.7 g/dL      Albumin 3.8 g/dL      ALT (SGPT) 68 U/L      AST (SGOT) 94 U/L      Alkaline Phosphatase 91 U/L      Total Bilirubin 0.6 mg/dL      Globulin 2.9 gm/dL      A/G Ratio 1.3 g/dL      BUN/Creatinine Ratio 14.1     Anion Gap 9.2 mmol/L      eGFR 77.0 mL/min/1.73     Narrative:      GFR Normal >60  Chronic Kidney  Disease <60  Kidney Failure <15    The GFR formula is only valid for adults with stable renal function between ages 18 and 70.    [place] Draw [[id number]] Collected: 05/30/24 0404    Specimen: Blood Updated: 05/30/24 0415    Narrative:      The following orders were created for panel order [place] Draw.  Procedure                               Abnormality         Status                     ---------                               -----------         ------                     Green Top (Gel)[[id number]]                                  Final result               Lavender Top[[id number]]                                     Final result               Gold Top - SST[[id number]]                                   Final result               Light Blue Top[[id number]]                                   Final result                 Please view results for these tests on the individual orders.    Green Top (Gel) [[id number]] Collected: 05/30/24 0404    Specimen: Blood Updated: 05/30/24 0415     Extra Tube Hold for add-ons.     Comment: Auto resulted.       Lavender Top [[id number]] Collected: 05/30/24 0404    Specimen: Blood Updated: 05/30/24 0415     Extra Tube hold for add-on     Comment: Auto resulted       Gold Top - SST [[id number]] Collected: 05/30/24 0404    Specimen: Blood Updated: 05/30/24 0415     Extra Tube Hold for add-ons.     Comment: Auto resulted.       Light Blue Top [[id number]] Collected: 05/30/24 0404    Specimen: Blood Updated: 05/30/24 0415     Extra Tube Hold for add-ons.     Comment: Auto resulted       CBC & Differential [[id number]]  (Abnormal) Collected: 05/30/24 0404    Specimen: Blood Updated: 05/30/24 0412    Narrative:      The following orders were created for panel order CBC & Differential.  Procedure                               Abnormality         Status                     ---------                               -----------         ------                     CBC Auto Differential[[id number]]         Abnormal            Final result                 Please view results for these tests on the individual orders.    CBC Auto Differential [[id number]]  (Abnormal) Collected: 05/30/24 0404    Specimen: Blood Updated: 05/30/24 0412     WBC 10.29 10*3/mm3      RBC 4.64 10*6/mm3      Hemoglobin 12.7 g/dL      Hematocrit 39.5 %      MCV 85.1 fL      MCH 27.4 pg      MCHC 32.2 g/dL      RDW 14.7 %      RDW-SD 45.3 fl      MPV 9.8 fL      Platelets 246 10*3/mm3      Neutrophil % 80.3 %      Lymphocyte % 10.7 %      Monocyte % 7.3 %      Eosinophil % 0.4 %      Basophil % 0.5 %      Immature Grans % 0.8 %      Neutrophils, Absolute 8.27 10*3/mm3      Lymphocytes, Absolute 1.10 10*3/mm3      Monocytes, Absolute 0.75 10*3/mm3      Eosinophils, Absolute 0.04 10*3/mm3      Basophils, Absolute 0.05 10*3/mm3      Immature Grans, Absolute 0.08 10*3/mm3      nRBC 0.0 /100 WBC     Blood Culture - Blood, Arm, Left [[id number]] Collected: 05/30/24 0404    Specimen: Blood from Arm, Left Updated: 05/30/24 0411    Blood Culture - Blood, Arm, Right [[id number]] Collected: 05/30/24 0404    Specimen: Blood from Arm, Right Updated: 05/30/24 0410            Imaging:  Imaging Results (Last 24 Hours)       Procedure Component Value Units Date/Time    CT Cervical Spine Without Contrast [[id number]] Collected: 05/30/24 0609     Updated: 05/30/24 0617    Narrative:      CT CERVICAL SPINE WO CONTRAST-     Date of Exam: 5/30/2024 5:26 AM     Indication: Neck injury after fall.     Comparison: None available.     Technique: Axial CT images were obtained of the cervical spine without  contrast administration.  Reconstructed coronal and sagittal images were  also obtained. Automated exposure control and iterative construction  methods were used.     Findings:  Exam is degraded by suboptimal positioning and patient motion, despite  repeating the exam. Note is made of fibrotic changes in the lung apices.  There is ectasia of the mid aortic arch measuring about  3.6 cm.  Atherosclerotic disease is present in the arch and left carotid  arteries. There is diffuse osteopenia. There is grade 1 anterolisthesis  of C4 on C5 and C7 on T1. There is degenerative disc disease with disc  osteophyte complexes and loss of disc height most pronounced at C5-6 and  C6-7 and to a lesser degree at C4-5. There is advanced facet arthropathy  throughout the cervical spine. No acute cervical spine fracture is  identified.       Impression:      Technically degraded exam as above. There is advanced cervical  degenerative disease with osteopenia. No definite acute cervical spine  fracture.           Electronically Signed By-Dr. Mark Gordon MD On:5/30/2024 6:15 AM       CT Head Without Contrast [[id number]] Collected: 05/30/24 0559     Updated: 05/30/24 0608    Narrative:      CT HEAD WO CONTRAST-     HISTORY:  Mental status changes. Fall.     TECHNIQUE:  Axial unenhanced head CT with multiplanar reformats. Radiation dose  reduction techniques included automated exposure control or exposure  modulation based on body size.     COMPARISON:  12/26/2014     FINDINGS:  Ventricular size and configuration are normal. No acute infarct or  hemorrhage is identified. There are no masses. There is no skull  fracture. There is progressive generalized atrophy with chronic small  vessel ischemic disease in the white matter. 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  There also appears to be an old lacunar infarct in the right side of the  elsa. Atherosclerotic calcifications are present in the carotid siphons.  There is a mild left mastoid effusion.       Impression:      Senescent changes, progressive since the prior exam. Mild left mastoid  effusion is present. No acute findings in the brain.           Electronically Signed By-Dr. Mark Gordon MD On:5/30/2024 6:06 AM       XR Shoulder 2+ View Left [[id number]] Collected: 05/30/24 0441     Updated:  05/30/24 0445    Narrative:      XR SHOULDER 2+ VW LEFT-: 5/30/2024 4:18 AM     INDICATION:  Pain after fall.     COMPARISON:  None available.     FINDINGS:  3 views of the left shoulder.   No fracture or dislocation. There is  mild AC joint and glenohumeral joint arthropathy. No AC joint  separation.  No foreign body.       Impression:      Mild degenerative disease. No acute findings.     Electronically Signed By-Dr. Mark Gordon MD On:5/30/2024 4:43 AM       XR Hip With or Without Pelvis 2 - 3 View Left [[id number]] Collected: 05/30/24 0438     Updated: 05/30/24 0443    Narrative:      XR HIP W OR WO PELVIS 2-3 VIEW LEFT-: 5/30/2024 4:18 AM     INDICATION:  Hip pain after fall.     COMPARISON:  None available.     FINDINGS:  AP pelvis and 2 view(s) of the left hip. No hip dislocation or  sacroiliac joint diastasis. No bone erosion or destruction. Patient is  status post ORIF of the left acetabulum with plates and screws. There is  degenerative joint space narrowing in both hips, left worse than right.  There is a left inferior pubic ramus fracture that is age indeterminate.       Impression:      ORIF of the left hip. Degenerative disease in both hips. No dislocation.  There is an age-indeterminate left inferior pubic ramus fracture. This  could be assessed with CT if indicated.     Electronically Signed By-Dr. Mark Gordon MD On:5/30/2024 4:41 AM       XR Chest 1 View [[id number]] Collected: 05/30/24 0437     Updated: 05/30/24 0440    Narrative:      AP PORTABLE CHEST     HISTORY:  Chest pain. Fall today.     COMPARISON:  12/26/2014     TECHNIQUE:  AP portable chest x-ray.     FINDINGS:  Right shoulder arthroplasty is present. The heart is mildly enlarged.  There is diffuse atherosclerotic calcification in the aorta. There is  chronic interstitial scarring throughout both lungs with some biapical  pleural thickening. No definite acute infiltrates. No pneumothorax.  There are multiple left-sided rib  fractures, most likely chronic.  Correlate with site of injury.       Impression:      Chronic changes in the lungs with no pneumothorax identified. There are  multiple left-sided rib fractures that are most likely chronic.  Correlate with site of injury.     Electronically Signed By-Dr. Mark Gordon MD On:5/30/2024 4:38 AM       XR Elbow 3+ View Left [[id number]] Collected: 05/30/24 0435     Updated: 05/30/24 0439    Narrative:      XR ELBOW 3+ VW LEFT-: 5/30/2024 4:18 AM     INDICATION:  Pain after fall. Skin tear.     COMPARISON:   None available.     FINDINGS:   3 views of the left elbow.  No fracture, dislocation, or effusion. No  bone erosion or destruction.  No foreign body. Patient is osteopenic. IV  cannula noted in the antecubital fossa.       Impression:      Negative left elbow.     Electronically Signed By-Dr. Mark Gordon MD On:5/30/2024 4:36 AM       XR Knee 3 View Left [[id number]] Collected: 05/30/24 0434     Updated: 05/30/24 0436    Narrative:      XR KNEE 3 VW LEFT-: 5/30/2024 4:18 AM     INDICATION:  Pain after fall.     COMPARISON:  None available.     FINDINGS:  3 view(s) of the left knee.  No fracture, dislocation, or effusion. No  bone erosion or destruction.  No foreign body. Patient is status post  arthroplasty. No evidence of hardware loosening. Atherosclerotic  calcifications are present.       Impression:      Arthroplasty changes. No acute findings.     Electronically Signed By-Dr. Mark Gordon MD On:5/30/2024 4:34 AM               Assessment:    Altered mental state      Plan:  Encephalopathy  - CVA vs delirium vs other  - no e/o infectious process -- u/a uninfectious and CXR clear, WBC normal, afebrile  - check MRI brain if able to lay still long enough for this  - delirium precautions  - gentle IVF today as she appears dry  - pain control -- will try and minimize narcotics    Mechanical fall  - xrays neg for acute fracture  - pain control  - PT/OT eval in  am    Hypothyroidism  - resume synthroid  - check TSH    Transaminitis  - check CK  - mild currently -- recheck in am    VHD  Chronic diastolic HF  - recent echo from earlier this year with mod AR and MR  - proBNP 2506  - hold home lasix for now as she appears dry -- gentle IVF      Maulik Orozco MD  Hospitalist Group  5/30/2024  08:15 EDT

## 2024-05-30 NOTE — PLAN OF CARE
Problem: Adult Inpatient Plan of Care  Goal: Plan of Care Review  Outcome: Ongoing, Progressing  Goal: Patient-Specific Goal (Individualized)  Outcome: Ongoing, Progressing  Goal: Absence of Hospital-Acquired Illness or Injury  Outcome: Ongoing, Progressing  Intervention: Identify and Manage Fall Risk  Intervention: Prevent and Manage VTE (Venous Thromboembolism) Risk  Intervention: Prevent Infection  Goal: Optimal Comfort and Wellbeing  Outcome: Ongoing, Progressing  Intervention: Monitor Pain and Promote Comfort  Intervention: Provide Person-Centered Care  Goal: Readiness for Transition of Care  Outcome: Ongoing, Progressing  Intervention: Mutually Develop Transition Plan     Problem: Skin Injury Risk Increased  Goal: Skin Health and Integrity  Outcome: Ongoing, Progressing   Goal Outcome Evaluation:   ED admit this shift, arrived on floor at approximately 1100, patient confused, VSS, medicated for pain per MAR.

## 2024-05-30 NOTE — ED PROVIDER NOTES
Subjective   History of Present Illness  The patient presents to the emergency department via EMS from the [facility].  EMS reports that the patient was found in the floor flailing about.  The patient's daughter arrived and stated that the nursing home had called her and stated that she had fallen out of bed.  They state that she had fallen out of bed on the opposite side of where she normally gets out of bed.  The daughter states that she has been screaming and yelling and disoriented every since she had gotten here.  She states the nursing home had told her last night that her mother had been doing fine and states that she only had episodes of intermittent confusion since Sunday.  She is currently in rehab there post left hip repair after a fall.  The daughter states up until her mother fell and broke her hip a few months ago she had been living independently in an assisted living facility in [place] and driving a car and had no issues with any confusion or altered mental status.  The patient daughter states that since Sunday that she has had intermittent periods of what she felt was confusion.  She states 1 minute she been talking fine and the next minute she be confused and not making sense.  The daughter states that she has not had any recent fevers that she is aware of.  She states she has not had any cough or any upper respiratory symptoms.  The patient complains of diffuse pain and is unable to articulate exactly where it is at.  She does have a large skin tear to her left shoulder, left elbow, and left knee.  She also has bruising around her left eye.    History provided by:  Patient   used: No        Review of Systems   Constitutional:  Negative for chills and fever.   HENT:  Negative for congestion, ear pain and sore throat.    Eyes:  Negative for pain.   Respiratory:  Negative for cough, chest tightness and shortness of breath.    Cardiovascular:  Negative for  chest pain.   Gastrointestinal:  Negative for abdominal pain, diarrhea, nausea and vomiting.   Genitourinary:  Negative for flank pain and hematuria.   Musculoskeletal:  Positive for arthralgias and joint swelling. Negative for back pain, neck pain and neck stiffness.   Skin:  Positive for color change and wound. Negative for pallor.   Neurological:  Positive for weakness. Negative for seizures and headaches.   Psychiatric/Behavioral:  Positive for agitation, confusion and decreased concentration.    All other systems reviewed and are negative.      Past Medical History:   Diagnosis Date    Aortic regurgitation     Atrial fibrillation     Breast cancer     Essential hypertension     Fatigue     Herpes zoster with complication 10/3/2016    Hypothyroidism     Irritable bowel syndrome     Mitral valve insufficiency     Nonrheumatic aortic (valve) insufficiency     Osteopenia of the elderly     Postmenopausal status     Vitamin B12 deficiency     Vitamin D deficiency        No Known Allergies    Past Surgical History:   Procedure Laterality Date    BREAST BIOPSY      BREAST SURGERY      HYSTERECTOMY      MASTECTOMY      OOPHORECTOMY      ORTHOPEDIC SURGERY      TONSILLECTOMY AND ADENOIDECTOMY      TOTAL KNEE ARTHROPLASTY         Family History   Problem Relation Age of Onset    Heart failure Mother         congestive    Arthritis Mother     Hypertension Mother     Stroke Father     Diabetes Father     Hypertension Father     Arthritis Father     Diabetes Brother     Cancer Brother     Liver disease Brother     Cancer Sister     Cancer Brother        Social History     Socioeconomic History    Marital status:    Tobacco Use    Smoking status: Never    Smokeless tobacco: Never   Vaping Use    Vaping status: Never Used   Substance and Sexual Activity    Alcohol use: No     Comment: caffeine use: 6 cups daily     Drug use: No    Sexual activity: Never           Objective   Physical Exam  Vitals and nursing note  reviewed.   Constitutional:       General: She is not in acute distress.     Appearance: Normal appearance. She is not toxic-appearing.   HENT:      Head: Normocephalic and atraumatic.      Mouth/Throat:      Mouth: Mucous membranes are moist.   Eyes:      General: No scleral icterus.     Pupils: Pupils are equal, round, and reactive to light.   Cardiovascular:      Rate and Rhythm: Normal rate and regular rhythm.      Pulses: Normal pulses.      Heart sounds: Normal heart sounds.   Pulmonary:      Effort: Pulmonary effort is normal. No respiratory distress.      Breath sounds: Normal breath sounds.   Abdominal:      General: Abdomen is flat. There is no distension.      Palpations: Abdomen is soft.      Tenderness: There is no abdominal tenderness.   Musculoskeletal:         General: Tenderness present. Normal range of motion.      Cervical back: Normal range of motion and neck supple. No rigidity.   Lymphadenopathy:      Cervical: No cervical adenopathy.   Skin:     General: Skin is warm and dry.      Comments: The patient has a large skin tear to her left shoulder.  She also has another large skin tear to her left knee.  She also has 1 to the left elbow.  Bleeding is controlled.   Neurological:      Mental Status: She is alert. She is disoriented and confused.      GCS: GCS eye subscore is 4. GCS verbal subscore is 5. GCS motor subscore is 4.         Procedures           ED Course  ED Course as of 05/30/24 0735   Thu May 30, 2024   0734 I spoke with Dr. Fran Peralta.  We reviewed the patient's test results.  He states that he will come see the patient and admit to the hospitalist service.  The patient's family was made aware of the admission. [TC]      ED Course User Index  [TC] Aide Beltran APRN                                             Medical Decision Making  Amount and/or Complexity of Data Reviewed  Labs: ordered.  Radiology: ordered.    Risk  Prescription drug management.        Final diagnoses:    Fall, initial encounter   Multiple skin tears   Altered mental status, unspecified altered mental status type       ED Disposition  ED Disposition       ED Disposition   Decision to Admit    Condition   --    Comment   --               No follow-up provider specified.       Medication List        ASK your doctor about these medications      * acetaminophen 325 MG tablet  Commonly known as: TYLENOL  Ask about: Which instructions should I use?     * acetaminophen 650 MG 8 hr tablet  Commonly known as: TYLENOL  Ask about: Which instructions should I use?           * This list has 2 medication(s) that are the same as other medications prescribed for you. Read the directions carefully, and ask your doctor or other care provider to review them with you.                     Aide Beltran, APRN  05/30/24 0755

## 2024-05-31 LAB
ALBUMIN SERPL-MCNC: 3.3 G/DL (ref 3.5–5.2)
ALBUMIN/GLOB SERPL: 1.4 G/DL
ALP SERPL-CCNC: 82 U/L (ref 39–117)
ALT SERPL W P-5'-P-CCNC: 40 U/L (ref 1–33)
ANION GAP SERPL CALCULATED.3IONS-SCNC: 8.2 MMOL/L (ref 5–15)
AST SERPL-CCNC: 23 U/L (ref 1–32)
BILIRUB SERPL-MCNC: 0.8 MG/DL (ref 0–1.2)
BUN SERPL-MCNC: 6 MG/DL (ref 8–23)
BUN/CREAT SERPL: 10.7 (ref 7–25)
CALCIUM SPEC-SCNC: 8.5 MG/DL (ref 8.2–9.6)
CHLORIDE SERPL-SCNC: 105 MMOL/L (ref 98–107)
CO2 SERPL-SCNC: 25.8 MMOL/L (ref 22–29)
CREAT SERPL-MCNC: 0.56 MG/DL (ref 0.57–1)
EGFRCR SERPLBLD CKD-EPI 2021: 82.6 ML/MIN/1.73
GLOBULIN UR ELPH-MCNC: 2.4 GM/DL
GLUCOSE SERPL-MCNC: 114 MG/DL (ref 65–99)
MAGNESIUM SERPL-MCNC: 1.8 MG/DL (ref 1.7–2.3)
PHOSPHATE SERPL-MCNC: 3 MG/DL (ref 2.5–4.5)
POTASSIUM SERPL-SCNC: 3.6 MMOL/L (ref 3.5–5.2)
PROCALCITONIN SERPL-MCNC: 0.07 NG/ML (ref 0–0.25)
PROT SERPL-MCNC: 5.7 G/DL (ref 6–8.5)
SODIUM SERPL-SCNC: 139 MMOL/L (ref 136–145)

## 2024-05-31 PROCEDURE — 83735 ASSAY OF MAGNESIUM: CPT | Performed by: INTERNAL MEDICINE

## 2024-05-31 PROCEDURE — 84100 ASSAY OF PHOSPHORUS: CPT | Performed by: INTERNAL MEDICINE

## 2024-05-31 PROCEDURE — 99232 SBSQ HOSP IP/OBS MODERATE 35: CPT | Performed by: FAMILY MEDICINE

## 2024-05-31 PROCEDURE — 97166 OT EVAL MOD COMPLEX 45 MIN: CPT

## 2024-05-31 PROCEDURE — 80053 COMPREHEN METABOLIC PANEL: CPT | Performed by: INTERNAL MEDICINE

## 2024-05-31 PROCEDURE — 25010000002 ONDANSETRON PER 1 MG: Performed by: INTERNAL MEDICINE

## 2024-05-31 PROCEDURE — 97161 PT EVAL LOW COMPLEX 20 MIN: CPT

## 2024-05-31 PROCEDURE — 36415 COLL VENOUS BLD VENIPUNCTURE: CPT | Performed by: INTERNAL MEDICINE

## 2024-05-31 PROCEDURE — 92610 EVALUATE SWALLOWING FUNCTION: CPT

## 2024-05-31 PROCEDURE — 25010000002 ENOXAPARIN PER 10 MG: Performed by: INTERNAL MEDICINE

## 2024-05-31 PROCEDURE — 84145 PROCALCITONIN (PCT): CPT | Performed by: FAMILY MEDICINE

## 2024-05-31 RX ORDER — FUROSEMIDE 20 MG/1
20 TABLET ORAL DAILY
Status: DISCONTINUED | OUTPATIENT
Start: 2024-05-31 | End: 2024-06-03 | Stop reason: HOSPADM

## 2024-05-31 RX ORDER — METOPROLOL TARTRATE 50 MG/1
50 TABLET, FILM COATED ORAL 2 TIMES DAILY
Status: DISCONTINUED | OUTPATIENT
Start: 2024-05-31 | End: 2024-06-03 | Stop reason: HOSPADM

## 2024-05-31 RX ADMIN — FUROSEMIDE 20 MG: 20 TABLET ORAL at 17:49

## 2024-05-31 RX ADMIN — METOPROLOL TARTRATE 25 MG: 25 TABLET, FILM COATED ORAL at 10:11

## 2024-05-31 RX ADMIN — Medication 10 ML: at 20:33

## 2024-05-31 RX ADMIN — TRAMADOL HYDROCHLORIDE 50 MG: 50 TABLET ORAL at 05:49

## 2024-05-31 RX ADMIN — LEVOTHYROXINE SODIUM 25 MCG: 0.03 TABLET ORAL at 05:49

## 2024-05-31 RX ADMIN — TAMSULOSIN HYDROCHLORIDE 0.4 MG: 0.4 CAPSULE ORAL at 10:11

## 2024-05-31 RX ADMIN — TRAMADOL HYDROCHLORIDE 50 MG: 50 TABLET ORAL at 21:55

## 2024-05-31 RX ADMIN — ENOXAPARIN SODIUM 30 MG: 100 INJECTION SUBCUTANEOUS at 20:32

## 2024-05-31 RX ADMIN — METOPROLOL TARTRATE 50 MG: 50 TABLET, FILM COATED ORAL at 20:32

## 2024-05-31 RX ADMIN — ONDANSETRON 4 MG: 2 INJECTION INTRAMUSCULAR; INTRAVENOUS at 23:07

## 2024-05-31 NOTE — THERAPY EVALUATION
Patient Name: Keyana Nguyen  : 1925    MRN: [id number]                              Today's Date: 2024       Admit Date: 2024    Visit Dx:     ICD-10-CM ICD-9-CM   1. Altered mental status, unspecified altered mental status type  R41.82 780.97   2. Fall, initial encounter  W19.XXXA E888.9   3. Multiple skin tears  T14.8XXA 879.8   4. Oropharyngeal dysphagia  R13.12 787.22   5. Decreased activities of daily living (ADL)  Z78.9 V49.89     Patient Active Problem List   Diagnosis    Aortic valve insufficiency    Atrial fibrillation    Fatigue    Hypothyroidism    Irritable bowel syndrome    Nonrheumatic mitral valve regurgitation    Senile osteopenia    Cobalamin deficiency    Vitamin D deficiency    Arthralgia of right hip    Acute midline low back pain with right-sided sciatica    Arthritis    Chronic back pain    B12 deficiency    Essential hypertension    Itching    Numbness and tingling    Nonrheumatic tricuspid valve regurgitation    Functional diarrhea    Altered mental state     Past Medical History:   Diagnosis Date    Aortic regurgitation     Atrial fibrillation     Breast cancer     Essential hypertension     Fatigue     Herpes zoster with complication 10/3/2016    Hypothyroidism     Irritable bowel syndrome     Mitral valve insufficiency     Nonrheumatic aortic (valve) insufficiency     Osteopenia of the elderly     Postmenopausal status     Vitamin B12 deficiency     Vitamin D deficiency      Past Surgical History:   Procedure Laterality Date    BREAST BIOPSY      BREAST SURGERY      HYSTERECTOMY      MASTECTOMY      OOPHORECTOMY      ORTHOPEDIC SURGERY      TONSILLECTOMY AND ADENOIDECTOMY      TOTAL KNEE ARTHROPLASTY        General Information       Row Name 24 1114          OT Time and Intention    Document Type evaluation  -AV     Mode of Treatment individual therapy;occupational therapy  -AV       Row Name 24 1114          General Information    Patient Profile Reviewed  "yes  -AV     Prior Level of Function independent:;ADL's  Per EMR, patient is independent with ADLs at baseline and ambulates with rollator.  Further specifics unknown as patient is confused/poor historian (i.e.\"we use hamburger\" in response to functional related questions).  -AV     Existing Precautions/Restrictions fall  DNR.  Per EMR: Multiple left rib fractures \"likely chronic\"  -AV     Barriers to Rehab cognitive status  -AV       Row Name 05/31/24 1114          Occupational Profile    Reason for Services/Referral (Occupational Profile) Patient is a 89 year old female admitted to [facility] on 5/30/2024 from subacute rehab with altered mental status.  She is currently on 3W/room air.   OT consulted due to impaired ADL/transfer independence.  No previous OT services for current condition.  -AV       Row Name 05/31/24 1114          Living Environment    People in Home facility resident  Assisted living resident  -AV       Row Name 05/31/24 1114          Cognition    Orientation Status (Cognition) --  Alert.  Pleasantly confused.  Able to follow some commands with repeated direction required.  Decreased ability to retain information.  Impaired safety awareness.  -AV       Row Name 05/31/24 1114          Safety Issues, Functional Mobility    Impairments Affecting Function (Mobility) balance;cognition;endurance/activity tolerance;strength  -AV               User Key  (r) = Recorded By, (t) = Taken By, (c) = Cosigned By      Initials Name Provider Type    Kulwinder Lundberg, OT Occupational Therapist                     Mobility/ADL's       Row Name 05/31/24 1118          Bed Mobility    Bed Mobility supine-sit-supine  -AV     Supine-Sit-Supine [place] (Bed Mobility) verbal cues;nonverbal cues (demo/gesture);minimum assist (75% patient effort)  -AV     Assistive Device (Bed Mobility) bed rails  -AV       Row Name 05/31/24 1118          Transfers    Transfers sit-stand transfer  -AV       Row Name " 05/31/24 1118          Sit-Stand Transfer    Sit-Stand [place] (Transfers) minimum assist (75% patient effort);verbal cues;nonverbal cues (demo/gesture)  -AV       Row Name 05/31/24 1118          Activities of Daily Living    BADL Assessment/Intervention --  Independent feeding with set up/cues.  Mod assist grooming with set up and cues in bed.  Min-mod assist bathing/dressing.  Independent don/doff slipper socks with max cues seated bedside.  Incontinent.  -AV               User Key  (r) = Recorded By, (t) = Taken By, (c) = Cosigned By      Initials Name Provider Type    AV Kulwinder Kline OT Occupational Therapist                   Obj/Interventions       Row Name 05/31/24 1119          Sensory Assessment (Somatosensory)    Sensory Assessment (Somatosensory) UE sensation intact  -AV     Sensory Assessment Sensory awareness to light touch intact bilateral upper extremities  -AV       [facility] Name 05/31/24 1119          Vision Assessment/Intervention    Visual Impairment/Limitations WFL  -AV     Vision Assessment Comment Able to see food on plate  -AV       [facility] Name 05/31/24 1119          Range of Motion Comprehensive    General Range of Motion upper extremity range of motion deficits identified  -AV     Comment, General Range of Motion Left shoulder AAROM <90  -AV       [facility] Name 05/31/24 1119          Strength Comprehensive (MMT)    Comment, General Manual Muscle Testing (MMT) Assessment 4(-)/5 left biceps, triceps and .  4/5 right biceps, triceps and .  -AV       [facility] Name 05/31/24 1119          Motor Skills    Motor Skills coordination;functional endurance  -AV     Coordination --  Right dominant.  Not tested  -AV     Functional Endurance Fair minus  -AV       [facility] Name 05/31/24 1119          Balance    Balance Assessment sitting dynamic balance;standing dynamic balance  -AV     Dynamic Sitting Balance standby assist  -AV     Dynamic Standing Balance moderate assist  -AV               User Key  (r) =  Recorded By, (t) = Taken By, (c) = Cosigned By      Initials Name Provider Type    AV Kulwinder Kline, OT Occupational Therapist                   Goals/Plan       Row Name 05/31/24 1123          Transfer Goal 1 (OT)    Activity/Assistive Device (Transfer Goal 1, OT) transfers, all;walker, rolling  -AV     [place] Level/Cues Needed (Transfer Goal 1, OT) contact guard required;minimum assist (75% or more patient effort);set-up required;verbal cues required  -AV     Time Frame (Transfer Goal 1, OT) long term goal (LTG);10 days  -AV       Row Name 05/31/24 1123          Bathing Goal 1 (OT)    Activity/Device (Bathing Goal 1, OT) bathing skills, all  -AV     [place] Level/Cues Needed (Bathing Goal 1, OT) contact guard required;minimum assist (75% or more patient effort);set-up required;verbal cues required  -AV     Time Frame (Bathing Goal 1, OT) long term goal (LTG);10 days  -AV       Row Name 05/31/24 1123          Dressing Goal 1 (OT)    Activity/Device (Dressing Goal 1, OT) dressing skills, all  -AV     [place]/Cues Needed (Dressing Goal 1, OT) contact guard required;minimum assist (75% or more patient effort);set-up required;verbal cues required  -AV     Time Frame (Dressing Goal 1, OT) long term goal (LTG);10 days  -AV       Row Name 05/31/24 1123          Toileting Goal 1 (OT)    Activity/Device (Toileting Goal 1, OT) toileting skills, all;raised toilet seat  -AV     [place] Level/Cues Needed (Toileting Goal 1, OT) contact guard required;minimum assist (75% or more patient effort);set-up required;verbal cues required  -AV     Time Frame (Toileting Goal 1, OT) long term goal (LTG);10 days  -AV       Row Name 05/31/24 1123          Grooming Goal 1 (OT)    Activity/Device (Grooming Goal 1, OT) grooming skills, all  -AV     [place] (Grooming Goal 1, OT) contact guard required;minimum assist (75% or more patient effort);set-up required;verbal cues required  -AV     Time Frame (Grooming Goal 1,  OT) long term goal (LTG);10 days  -AV       Row Name 05/31/24 1123          Strength Goal 1 (OT)    Strength Goal 1 (OT) Patient will demonstrate 4/5 left biceps, triceps and  to increase ADL and transfer independence.  -AV     Time Frame (Strength Goal 1, OT) long term goal (LTG);10 days  -AV       Row Name 05/31/24 1123          Problem Specific Goal 1 (OT)    Problem Specific Goal 1 (OT) Patient will demonstrate fair endurance/activity tolerance needed to support ADLs.  -AV     Time Frame (Problem Specific Goal 1, OT) long term goal (LTG);10 days  -AV       Row Name 05/31/24 1123          Therapy Assessment/Plan (OT)    Planned Therapy Interventions (OT) activity tolerance training;BADL retraining;functional balance retraining;occupation/activity based interventions;patient/caregiver education/training;strengthening exercise;transfer/mobility retraining  -AV               User Key  (r) = Recorded By, (t) = Taken By, (c) = Cosigned By      Initials Name Provider Type    AV Kulwinder Kline, AMNA Occupational Therapist                   Clinical Impression       Row Name 05/31/24 1121          Pain Assessment    Additional Documentation Pain Scale: FACES Pre/Post-Treatment (Group)  -AV       Row Name 05/31/24 1121          Pain Scale: FACES Pre/Post-Treatment    Pain: FACES Scale, Pretreatment 2-->hurts little bit  -AV     Posttreatment Pain Rating 2-->hurts little bit  -AV     Pain Location - Side/Orientation Left  -AV     Pain Location upper  -AV     Pain Location - extremity  -AV       Row Name 05/31/24 1121          Plan of Care Review    Plan of Care Reviewed With patient  -AV     Progress no change  First session for evaluation  -AV     Outcome Evaluation Patient presents with limitations of balance, strength, endurance/activity tolerance and cognition/safety awareness which impede her ability to perform ADL and transfers as prior.  The skills of a therapist will be required to safely and effectively  implement treatment plan to restore maximal level of function.  -AV       Row Name 05/31/24 1121          Therapy Assessment/Plan (OT)    Patient/Family Therapy Goal Statement (OT) None stated  -AV     Rehab Potential (OT) fair, will monitor progress closely  Impaired cognition  -AV     Criteria for Skilled Therapeutic Interventions Met (OT) yes;meets criteria;skilled treatment is necessary  -AV     Therapy Frequency (OT) 5 times/wk  -AV       Row Name 05/31/24 1121          Therapy Plan Review/Discharge Plan (OT)    Anticipated Discharge Disposition (OT) sub acute care setting  -AV       Row Name 05/31/24 1121          Vital Signs    O2 Delivery Pre Treatment room air  -AV     O2 Delivery Intra Treatment room air  -AV     O2 Delivery Post Treatment room air  -AV       Row Name 05/31/24 1121          Positioning and Restraints    Pre-Treatment Position in bed  -AV     Post Treatment Position bed  -AV     In Bed call light within reach;encouraged to call for assist;exit alarm on  -AV               User Key  (r) = Recorded By, (t) = Taken By, (c) = Cosigned By      Initials Name Provider Type    AV Kulwinder Kline, OT Occupational Therapist                   Outcome Measures       Row Name 05/31/24 1124          How much help from another is currently needed...    Putting on and taking off regular lower body clothing? 2  -AV     Bathing (including washing, rinsing, and drying) 2  -AV     Toileting (which includes using toilet bed pan or urinal) 2  -AV     Putting on and taking off regular upper body clothing 2  -AV     Taking care of personal grooming (such as brushing teeth) 2  -AV     Eating meals 4  -AV     AM-PAC 6 Clicks Score (OT) 14  -AV       Row Name 05/31/24 1124          Functional Assessment    Outcome Measure Options AM-PAC 6 Clicks Daily Activity (OT);Optimal Instrument  -AV       Row Name 05/31/24 1124          Optimal Instrument    Optimal Instrument Optimal - 3  -AV     Bending/Stooping 2  -AV      Standing 3  -AV     Reaching 2  -AV     From the list, choose the 3 activities you would most like to be able to do without any difficulty Bending/stooping;Standing;Reaching  -AV     Total Score Optimal - 3 7  -AV               User Key  (r) = Recorded By, (t) = Taken By, (c) = Cosigned By      Initials Name Provider Type    AV Kulwinder Kline OT Occupational Therapist                    Occupational Therapy Education       Title: PT OT SLP Therapies (Done)       Topic: Occupational Therapy (Done)       Point: ADL training (Done)       Description:   Instruct learner(s) on proper safety adaptation and remediation techniques during self care or transfers.   Instruct in proper use of assistive devices.                  Learning Progress Summary             Patient Acceptance, E, VU by AV at 5/31/2024 1125                         Point: Home exercise program (Done)       Description:   Instruct learner(s) on appropriate technique for monitoring, assisting and/or progressing therapeutic exercises/activities.                  Learning Progress Summary             Patient Acceptance, E, VU by AV at 5/31/2024 1125                         Point: Precautions (Done)       Description:   Instruct learner(s) on prescribed precautions during self-care and functional transfers.                  Learning Progress Summary             Patient Acceptance, E, VU by AV at 5/31/2024 1125                         Point: Body mechanics (Done)       Description:   Instruct learner(s) on proper positioning and spine alignment during self-care, functional mobility activities and/or exercises.                  Learning Progress Summary             Patient Acceptance, E, VU by AV at 5/31/2024 1125                                         User Key       Initials Effective Dates Name Provider Type Discipline     06/16/21 -  Kulwinder Kline OT Occupational Therapist OT                  OT Recommendation and Plan  Planned Therapy Interventions  (OT): activity tolerance training, BADL retraining, functional balance retraining, occupation/activity based interventions, patient/caregiver education/training, strengthening exercise, transfer/mobility retraining  Therapy Frequency (OT): 5 times/wk  Plan of Care Review  Plan of Care Reviewed With: patient  Progress: no change (First session for evaluation)  Outcome Evaluation: Patient presents with limitations of balance, strength, endurance/activity tolerance and cognition/safety awareness which impede her ability to perform ADL and transfers as prior.  The skills of a therapist will be required to safely and effectively implement treatment plan to restore maximal level of function.     Time Calculation:   Evaluation Complexity (OT)  Review Occupational Profile/Medical/Therapy History Complexity: expanded/moderate complexity  Assessment, Occupational Performance/Identification of Deficit Complexity: 3-5 performance deficits  Clinical Decision Making Complexity (OT): detailed assessment/moderate complexity  Overall Complexity of Evaluation (OT): moderate complexity     Time Calculation- OT       Row Name 05/31/24 1126             Time Calculation- OT    OT Received On 05/31/24  -AV      OT Goal Re-Cert Due Date 06/09/24  -AV         Untimed Charges    OT Eval/Re-eval Minutes 35  -AV         Total Minutes    Untimed Charges Total Minutes 35  -AV       Total Minutes 35  -AV                User Key  (r) = Recorded By, (t) = Taken By, (c) = Cosigned By      Initials Name Provider Type    AV Kulwinder Kline OT Occupational Therapist                  Therapy Charges for Today       Code Description Service Date Service Provider Modifiers Qty    04941168419 HC OT EVAL MOD COMPLEXITY 3 5/31/2024 Kulwinder Kline OT GO 1                 Kulwinder Kline OT  5/31/2024

## 2024-05-31 NOTE — THERAPY EVALUATION
Acute Care - Speech Language Pathology   Swallow Initial Evaluation YAMEL [facility]     Patient Name: Keyana Nguyen  : 1925  MRN: [id number]  Today's Date: 2024               Admit Date: 2024    Visit Dx:     ICD-10-CM ICD-9-CM   1. Altered mental status, unspecified altered mental status type  R41.82 780.97   2. Fall, initial encounter  W19.XXXA E888.9   3. Multiple skin tears  T14.8XXA 879.8   4. Oropharyngeal dysphagia  R13.12 787.22     Patient Active Problem List   Diagnosis    Aortic valve insufficiency    Atrial fibrillation    Fatigue    Hypothyroidism    Irritable bowel syndrome    Nonrheumatic mitral valve regurgitation    Senile osteopenia    Cobalamin deficiency    Vitamin D deficiency    Arthralgia of right hip    Acute midline low back pain with right-sided sciatica    Arthritis    Chronic back pain    B12 deficiency    Essential hypertension    Itching    Numbness and tingling    Nonrheumatic tricuspid valve regurgitation    Functional diarrhea    Altered mental state     Past Medical History:   Diagnosis Date    Aortic regurgitation     Atrial fibrillation     Breast cancer     Essential hypertension     Fatigue     Herpes zoster with complication 10/3/2016    Hypothyroidism     Irritable bowel syndrome     Mitral valve insufficiency     Nonrheumatic aortic (valve) insufficiency     Osteopenia of the elderly     Postmenopausal status     Vitamin B12 deficiency     Vitamin D deficiency      Past Surgical History:   Procedure Laterality Date    BREAST BIOPSY      BREAST SURGERY      HYSTERECTOMY      MASTECTOMY      OOPHORECTOMY      ORTHOPEDIC SURGERY      TONSILLECTOMY AND ADENOIDECTOMY      TOTAL KNEE ARTHROPLASTY         SLP Recommendation and Plan             Inpatient Speech Pathology Dysphagia Evaluation        PAIN SCALE: None indicated    PRECAUTIONS/CONTRAINDICATIONS: Standard, fall    SUSPECTED ABUSE/NEGLECT/EXPLOITATION: None identified    SOCIAL/PSYCHOLOGICAL  "NEEDS/BARRIERS: None identified    PAST SOCIAL HISTORY: 89-year-old female, nursing home resident    PRIOR FUNCTION: Patient is currently on a regular diet however does have history of dysphagia    PATIENT GOALS/EXPECTATIONS: Did not state or indicate goals or expectations    HISTORY: 89-year-old female admitted to [facility] on 5/30/2024 secondary to altered mental state.  She was referred for speech pathology dysphagia evaluation due to failing nursing dysphagia screening and nursing reporting patient \"choking on pills and water\".  Patient did undergo a modified barium swallow study on 3/1/2024 at [facility].  Radiology reports reveal penetration with thin consistency and single episode of aspiration and liquids.    CURRENT DIET LEVEL: Regular    OBJECTIVE:    TEST ADMINISTERED: Clinical dysphagia evaluation    COGNITION/SAFETY AWARENESS: Not thoroughly evaluated at this time    BEHAVIORAL OBSERVATIONS: Pleasant, alert and cooperative    ORAL MOTOR EXAM: Natural dentition, dry oral mucosa    VOICE QUALITY: Clear    REFLEX EXAM: Nonproductive    POSTURE: Sitting fully upright in bed    FEEDING/SWALLOWING FUNCTION: Assessed with thin liquids, nectar thick liquids, purée solids, regular solids    CLINICAL OBSERVATIONS: Nectar thick liquids by spoon, control cup drink and controlled straw drink.  Swallows were completed with mild delay.  Vocal quality clear and laryngeal sounds clear with cervical auscultation.  Thin liquids by spoon with swallows completed with throat clearing.  Thin liquids via cup with swallows completed with delay with throat clearing.  Delayed cough.  Thin liquids via straw with single sip with immediate coughing.  Purée by spoon with delayed swallow completed.  Regular solids with extended mastication, poor oral residue clearance requiring liquid wash assist of nectar thick liquids.  Laryngeal elevation on palpation.  Patient exhibiting swallow delay with all consistencies.  Clinical " signs and symptoms of aspiration were observed at bedside with thin liquids.  Silent aspiration cannot be ruled out.    DYSPHAGIA CRITERIA: Risk of aspiration, swallow delay    FUNCTIONAL ASSESSMENT INSTRUMENT: Patient currently scored a level 5 of 7 on Functional Communication Measures for swallowing indicating a 20-39% limitation in function.    ASSESSMENT/ PLAN OF CARE:  Pt presents with limitations, noted below, that impede patient's ability to tolerate least restrictive diet safely and independently. The skills of a therapist will be required to safely and effectively implement the following treatment plan to restore maximal level of function.    PROBLEMS:  1.  Risk of aspiration, swallow delay   LTG 1: 30 days.  Patient will increase functional communication measures for swallowing to level 6 of 7, indicating 1-19% limitation in function.   STG 1a: 14 days.  Patient will tolerate trials of nectar thick liquids with minimal to no signs or symptoms of aspiration.   STG 1b: 14 days.  Patient will tolerate trials of mechanical soft solids with minimal to no signs or symptoms of aspiration.   STG 1c: 14 days.  Patient will tolerate diet and mechanical soft solids, nectar thickened liquids utilizing strategies with moderate cueing.   TREATMENT: Speech therapy for dysphagia, education of strategies and tolerance of least restrictive diet.    FREQUENCY/DURATION: Daily, 5 days a week    REHAB POTENTIAL:  Pt has good rehab potential.  The following limitations may influence improvement/ length of tx medical status.    RECOMMENDATIONS:   1.   DIET: Mechanical soft solids, nectar thickened liquids    2.  POSITION: Upright for all p.o. intake, 30 minutes following    3.  COMPENSATORY STRATEGIES: Assist for meal set up, small bites and sips, controlled drink, meds in applesauce or pudding.    Pt/responsible party agrees with plan of care and has been informed of all alternatives, risks and benefits.                  Anticipated Discharge Disposition (SLP): anticipate therapy at next level of care (05/31/24 1011)                                                               EDUCATION  The patient has been educated in the following areas:   Dysphagia (Swallowing Impairment).                Time Calculation:    Time Calculation- SLP       Row Name 05/31/24 1011             Time Calculation- SLP    SLP Start Time 0630  -SN      SLP Stop Time 0730  -SN      SLP Time Calculation (min) 60 min  -SN      SLP Received On 05/31/24  -SN         Untimed Charges    13973-WG Eval Oral Pharyng Swallow Minutes 60  -SN         Total Minutes    Untimed Charges Total Minutes 60  -SN       Total Minutes 60  -SN                User Key  (r) = Recorded By, (t) = Taken By, (c) = Cosigned By      Initials Name Provider Type    SN Renee Stevens MS-CCC/SLP, [facility] Speech and Language Pathologist                    Therapy Charges for Today       Code Description Service Date Service Provider Modifiers Qty    [id number] HC ST EVAL ORAL PHARYNG SWALLOW 4 5/31/2024 Renee Stevens MS-CCC/SLP, [facility] GN 1                 MARIAH Taylor/[facility]KE FRAGA  5/31/2024

## 2024-05-31 NOTE — THERAPY EVALUATION
Acute Care - Physical Therapy Initial Evaluation  YAMEL Winston     Patient Name: Keyana Nguyen  : 1925  MRN: [id number]  Today's Date: 2024      Visit Dx:     ICD-10-CM ICD-9-CM   1. Altered mental status, unspecified altered mental status type  R41.82 780.97   2. Fall, initial encounter  W19.XXXA E888.9   3. Multiple skin tears  T14.8XXA 879.8   4. Oropharyngeal dysphagia  R13.12 787.22   5. Decreased activities of daily living (ADL)  Z78.9 V49.89   6. Difficulty walking  R26.2 719.7     Patient Active Problem List   Diagnosis    Aortic valve insufficiency    Atrial fibrillation    Fatigue    Hypothyroidism    Irritable bowel syndrome    Nonrheumatic mitral valve regurgitation    Senile osteopenia    Cobalamin deficiency    Vitamin D deficiency    Arthralgia of right hip    Acute midline low back pain with right-sided sciatica    Arthritis    Chronic back pain    B12 deficiency    Essential hypertension    Itching    Numbness and tingling    Nonrheumatic tricuspid valve regurgitation    Functional diarrhea    Altered mental state     Past Medical History:   Diagnosis Date    Aortic regurgitation     Atrial fibrillation     Breast cancer     Essential hypertension     Fatigue     Herpes zoster with complication 10/3/2016    Hypothyroidism     Irritable bowel syndrome     Mitral valve insufficiency     Nonrheumatic aortic (valve) insufficiency     Osteopenia of the elderly     Postmenopausal status     Vitamin B12 deficiency     Vitamin D deficiency      Past Surgical History:   Procedure Laterality Date    BREAST BIOPSY      BREAST SURGERY      HYSTERECTOMY      MASTECTOMY      OOPHORECTOMY      ORTHOPEDIC SURGERY      TONSILLECTOMY AND ADENOIDECTOMY      TOTAL KNEE ARTHROPLASTY       PT Assessment (Last 12 Hours)       PT Evaluation and Treatment       Row Name 24 1400          Physical Therapy Time and Intention    Subjective Information no complaints  -DP     Document Type evaluation  -DP      Mode of Treatment individual therapy;physical therapy  -DP     Patient Effort good  -DP       Row Name 05/31/24 1400          General Information    Patient Profile Reviewed yes  -DP     Patient Observations alert;cooperative  -DP     General Observations of Patient Pt was plesantly confused  -DP     Prior Level of Function independent:;gait;transfer;bed mobility;ADL's  -DP     Equipment Currently Used at Home rollator  -DP     Existing Precautions/Restrictions fall  -DP     Barriers to Rehab none identified  -DP       Row Name 05/31/24 1400          Living Environment    Current Living Arrangements assisted living facility  -DP       Row Name 05/31/24 1400          Range of Motion (ROM)    Range of Motion bilateral lower extremities;ROM is WFL  -DP       Row Name 05/31/24 1400          Strength Comprehensive (MMT)    General Manual Muscle Testing (MMT) Assessment lower extremity strength deficits identified  -DP     Comment, General Manual Muscle Testing (MMT) Assessment BLE: 4-/5  -DP       Row Name 05/31/24 1400          Bed Mobility    Bed Mobility supine-sit-supine  -DP     Supine-Sit-Supine [place] (Bed Mobility) minimum assist (75% patient effort)  -DP     Assistive Device (Bed Mobility) bed rails;draw sheet;head of bed elevated  -DP       Row Name 05/31/24 1400          Transfers    Transfers sit-stand transfer  -DP       Row Name 05/31/24 1400          Sit-Stand Transfer    Sit-Stand [place] (Transfers) minimum assist (75% patient effort)  -DP       Row Name 05/31/24 1400          Gait/Stairs (Locomotion)    Gait/Stairs Locomotion gait/ambulation assistive device  -DP     [place] Level (Gait) minimum assist (75% patient effort)  -DP     Assistive Device (Gait) walker, front-wheeled  -DP     Patient was able to Ambulate yes  -DP     Distance in Feet (Gait) 14  -DP     Comment, (Gait/Stairs) Pt needed assistance to maneuver the walker  -DP       Row Name 05/31/24 1400          Safety Issues,  Functional Mobility    Impairments Affecting Function (Mobility) balance;cognition;endurance/activity tolerance;strength  -DP       Row Name 05/31/24 1400          Balance    Balance Assessment standing dynamic balance  -DP     Dynamic Standing Balance minimal assist  -DP     Position/Device Used, Standing Balance supported;walker, front-wheeled  -DP       Row Name             Wound 05/30/24 0734 Left axilla    Wound - Properties Group Placement Date: 05/30/24  -DG Placement Time: 0734  -DG Present on Original Admission: Y  -DG Side: Left  -DG Location: axilla  -DG Additional Comments: patient has skin tear to left shoulder present on arrival  -DG    Retired Wound - Properties Group Placement Date: 05/30/24  -DG Placement Time: 0734  -DG Present on Original Admission: Y  -DG Side: Left  -DG Location: axilla  -DG Additional Comments: patient has skin tear to left shoulder present on arrival  -DG    Retired Wound - Properties Group Date first assessed: 05/30/24  -DG Time first assessed: 0734  -DG Present on Original Admission: Y  -DG Side: Left  -DG Location: axilla  -DG Additional Comments: patient has skin tear to left shoulder present on arrival  -DG      Row Name             Wound 05/30/24 0736 Left distal thigh    Wound - Properties Group Placement Date: 05/30/24  -DG Placement Time: 0736  -DG Side: Left  -DG Orientation: distal  -DG Location: thigh  -DG    Retired Wound - Properties Group Placement Date: 05/30/24  -DG Placement Time: 0736  -DG Side: Left  -DG Orientation: distal  -DG Location: thigh  -DG    Retired Wound - Properties Group Date first assessed: 05/30/24  -DG Time first assessed: 0736  -DG Side: Left  -DG Location: thigh  -DG      Row Name             Wound 05/30/24 1038 Left anterior leg Skin Tear    Wound - Properties Group Placement Date: 05/30/24  -EH Placement Time: 1038  -EH Present on Original Admission: Y  -EH Side: Left  -EH Orientation: anterior  -EH Location: leg  -EH Primary Wound  Type: Skin tear  -EH    Retired Wound - Properties Group Placement Date: 05/30/24  - Placement Time: 1038  -EH Present on Original Admission: Y  -EH Side: Left  -EH Orientation: anterior  -EH Location: leg  -EH Primary Wound Type: Skin tear  -EH    Retired Wound - Properties Group Date first assessed: 05/30/24  - Time first assessed: 1038  -EH Present on Original Admission: Y  -EH Side: Left  -EH Location: leg  -EH Primary Wound Type: Skin tear  -EH      Row Name             Wound 05/30/24 1038 Right posterior elbow Skin Tear    Wound - Properties Group Placement Date: 05/30/24  - Placement Time: 1038  -EH Present on Original Admission: Y  -EH Side: Right  -EH Orientation: posterior  -EH Location: elbow  -EH Primary Wound Type: Skin tear  -EH    Retired Wound - Properties Group Placement Date: 05/30/24  - Placement Time: 1038  -EH Present on Original Admission: Y  -EH Side: Right  -EH Orientation: posterior  -EH Location: elbow  -EH Primary Wound Type: Skin tear  -EH    Retired Wound - Properties Group Date first assessed: 05/30/24  - Time first assessed: 1038  -EH Present on Original Admission: Y  -EH Side: Right  -EH Location: elbow  -EH Primary Wound Type: Skin tear  -EH      Row Name             Wound 05/31/24 1005 Left posterior elbow Skin Tear    Wound - Properties Group Placement Date: 05/31/24  - Placement Time: 1005  -EH Present on Original Admission: Y  -EH Side: Left  -EH Orientation: posterior  -EH Location: elbow  -EH Primary Wound Type: Skin tear  -EH    Retired Wound - Properties Group Placement Date: 05/31/24  - Placement Time: 1005  -EH Present on Original Admission: Y  -EH Side: Left  -EH Orientation: posterior  -EH Location: elbow  -EH Primary Wound Type: Skin tear  -EH    Retired Wound - Properties Group Date first assessed: 05/31/24  - Time first assessed: 1005  -EH Present on Original Admission: Y  -EH Side: Left  -EH Location: elbow  -EH Primary Wound Type: Skin tear  -EH       Row Name 05/31/24 1400          Plan of Care Review    Plan of Care Reviewed With patient  -DP     Outcome Evaluation Pt presents with decreased strength, transfers and functional mobility. Will benefit from inpatient PT services and continued PT services upon discharge.  -DP       Row Name 05/31/24 1400          Therapy Assessment/Plan (PT)    Rehab Potential (PT) good, to achieve stated therapy goals  -DP     Criteria for Skilled Interventions Met (PT) yes;meets criteria  -DP     Therapy Frequency (PT) daily  -DP     Predicted Duration of Therapy Intervention (PT) 10 days  -DP     Problem List (PT) problems related to;mobility  -DP     Activity Limitations Related to Problem List (PT) unable to transfer safely;unable to ambulate safely  -DP       Row Name 05/31/24 1400          PT Evaluation Complexity    History, PT Evaluation Complexity no personal factors and/or comorbidities  -DP     Examination of Body Systems (PT Eval Complexity) total of 4 or more elements  -DP     Clinical Presentation (PT Evaluation Complexity) stable  -DP     Clinical Decision Making (PT Evaluation Complexity) low complexity  -DP     Overall Complexity (PT Evaluation Complexity) low complexity  -DP       Row Name 05/31/24 1400          Physical Therapy Goals    Transfer Goal Selection (PT) transfer, PT goal 1  -DP     Gait Training Goal Selection (PT) gait training, PT goal 1  -DP       Row Name 05/31/24 1400          Transfer Goal 1 (PT)    Activity/Assistive Device (Transfer Goal 1, PT) sit-to-stand/stand-to-sit  -DP     [place] Level/Cues Needed (Transfer Goal 1, PT) supervision required  -DP     Time Frame (Transfer Goal 1, PT) 10 days  -DP       Row Name 05/31/24 1400          Gait Training Goal 1 (PT)    Activity/Assistive Device (Gait Training Goal 1, PT) assistive device use;walker, rolling  -DP     [place] Level (Gait Training Goal 1, PT) supervision required  -DP     Distance (Gait Training Goal 1, PT) 200  -DP      Time Frame (Gait Training Goal 1, PT) 10 days  -DP               User Key  (r) = Recorded By, (t) = Taken By, (c) = Cosigned By      Initials Name Provider Type    Jose Francisco Carrasco PT Physical Therapist    Manda Awad, RN Registered Nurse    Axel Aragon RN Registered Nurse                      PT Recommendation and Plan  Anticipated Discharge Disposition (PT): sub acute care setting  Planned Therapy Interventions (PT): balance training, bed mobility training, gait training, strengthening, transfer training  Therapy Frequency (PT): daily  Plan of Care Reviewed With: patient  Outcome Evaluation: Pt presents with decreased strength, transfers and functional mobility. Will benefit from inpatient PT services and continued PT services upon discharge.   Outcome Measures       Row Name 05/31/24 1400             How much help from another person do you currently need...    Turning from your back to your side while in flat bed without using bedrails? 3  -DP      Moving from lying on back to sitting on the side of a flat bed without bedrails? 3  -DP      Moving to and from a bed to a chair (including a wheelchair)? 3  -DP      Standing up from a chair using your arms (e.g., wheelchair, bedside chair)? 3  -DP      Climbing 3-5 steps with a railing? 2  -DP      To walk in hospital room? 2  -DP      AM-PAC 6 Clicks Score (PT) 16  -DP      Highest Level of Mobility Goal 5 --> Static standing  -DP         Functional Assessment    Outcome Measure Options AM-PAC 6 Clicks Basic Mobility (PT)  -DP                User Key  (r) = Recorded By, (t) = Taken By, (c) = Cosigned By      Initials Name Provider Type    Jose Francisco Carrasco PT Physical Therapist                     Time Calculation:    PT Charges       Row Name 05/31/24 1408             Time Calculation    PT Received On 05/31/24  -DP      PT Goal Re-Cert Due Date 06/09/24  -DP         Untimed Charges    PT Eval/Re-eval Minutes 40  -DP         Total Minutes     Untimed Charges Total Minutes 40  -DP       Total Minutes 40  -DP                User Key  (r) = Recorded By, (t) = Taken By, (c) = Cosigned By      Initials Name Provider Type    Jose Francisco Carrasco PT Physical Therapist                      PT G-Codes  Outcome Measure Options: AM-PAC 6 Clicks Basic Mobility (PT)  AM-PAC 6 Clicks Score (PT): 16  AM-PAC 6 Clicks Score (OT): 14    Jose Francisco Reyes PT  5/31/2024

## 2024-05-31 NOTE — PROGRESS NOTES
[facility]   Hospitalist Progress Note  Date: 2024  Patient Name: Keyana Nguyen  : 1925  MRN: [id number]  Date of admission: 2024      Subjective   Subjective       Chief complaint: Altered mental status    Summary:  89-year-old female with history of hypertension, dyslipidemia, hypothyroidism, recent left hip fracture status postrepair in 2024, hospitalized on 2024 for chief complaint of worsening confusion, at baseline she is alert and oriented x 3, workup ongoing for encephalopathy, no clear infectious process, MRI brain unremarkable, urinalysis not suggestive of UTI, could be hypertensive encephalopathy with blood pressures initially in the 200s over 100s, as blood pressures have been improving, patient's mentation also has been improving some.  Medications held including Marinol.    Interval follow-up: Seen and examined this morning, no acute distress, no acute major overnight events, patient pulled out her IV by accident.  She is more alert and awake, with still subtle confusion.  Pleasant and apologetic.  Blood pressures are trending better 160s over 90s, previously up to the 210s over 100s.  I do think some of her confusion is related to uncontrolled blood pressure.  Sats are in the 90s.  I resumed her on oral Lasix with slight elevation in her BNP.  Creatinine 0.56, BUN 6, potassium 3.6, sodium 139, procalcitonin 0.07.  Telemetry reviewed, no acute major events, A-fib rate controlled in the 60s.    Review of systems:  All systems reviewed negative except for weakness, fatigue, bruising to the face, intermittent confusion      Objective   Objective     Vitals:   Temp:  [97.4 °F (36.3 °C)-98.6 °F (37 °C)] 98.6 °F (37 °C)  Heart Rate:  [63] 63  Resp:  [18-24] 18  BP: (166-172)/() 171/83  Physical Exam    Constitutional: Awake, alert, no acute distress   Eyes: Pupils equal, sclerae anicteric, no conjunctival injection   HENT: Traumatic to bruising on her face  particular around her left eye, mucous membranes moist   Neck: Supple, no thyromegaly, no lymphadenopathy, trachea midline   Respiratory: Clear to auscultation bilaterally, nonlabored respirations    Cardiovascular: RRR, no murmurs, rubs, or gallops, palpable pedal pulses bilaterally   Gastrointestinal: Positive bowel sounds, soft, nontender, nondistended   Musculoskeletal: No bilateral ankle edema, no clubbing or cyanosis to extremities   Psychiatric: Appropriate affect, cooperative   Neurologic: Alert and awake to her surroundings, strength symmetric in all extremities with generalized weakness throughout, Cranial Nerves grossly intact to confrontation, speech clear   Skin: No rashes visible on exposed skin      Result Review    Result Review:  I have personally reviewed the pertinent results from the past 24 hours to 5/31/2024 15:48 EDT and agree with these findings:  [x]  Laboratory   CBC          3/14/2024    03:00 3/18/2024    04:00 5/30/2024    04:04   CBC   WBC 7.25  8.13  10.29    RBC 3.62  3.50  4.64    Hemoglobin 10.4  10.1  12.7    Hematocrit 34.7  33.0  39.5    MCV 95.9  94.3  85.1    MCH 28.7  28.9  27.4    MCHC 30.0  30.6  32.2    RDW 16.6  16.1  14.7    Platelets 289  295  246      BMP          5/26/2024    07:28 5/30/2024    04:04 5/31/2024    04:57   BMP   BUN  10  6    Creatinine  0.71  0.56    Sodium  141  139    Potassium 3.8  3.5  3.6    Chloride  104  105    CO2  27.8  25.8    Calcium  8.8  8.5      LIVER FUNCTION TESTS:      Lab 05/31/24 0457 05/30/24 0404   TOTAL PROTEIN 5.7* 6.7   ALBUMIN 3.3* 3.8   GLOBULIN 2.4 2.9   ALT (SGPT) 40* 68*   AST (SGOT) 23 94*   BILIRUBIN 0.8 0.6   ALK PHOS 82 91       [x]  Microbiology   Microbiology Results (last 10 days)       Procedure Component Value - Date/Time    Blood Culture - Blood, Arm, Left [[id number]]  (Normal) Collected: 05/30/24 0404    Lab Status: Preliminary result Specimen: Blood from Arm, Left Updated: 05/31/24 0415     Blood Culture No  growth at 24 hours    Blood Culture - Blood, Arm, Right [[id number]]  (Normal) Collected: 05/30/24 0404    Lab Status: Preliminary result Specimen: Blood from Arm, Right Updated: 05/31/24 0415     Blood Culture No growth at 24 hours              [x]  Radiology MRI Brain With & Without Contrast    Result Date: 5/30/2024  Impression: MR examination of the brain without and with IV contrast demonstrating mild atrophy and white matter changes.  No acute intracranial abnormality is seen.    Electronically Signed By-ELISEO LAWTON MD On:5/30/2024 6:12 PM      CT Cervical Spine Without Contrast    Result Date: 5/30/2024  Technically degraded exam as above. There is advanced cervical degenerative disease with osteopenia. No definite acute cervical spine fracture.    Electronically Signed By-Dr. Mark Gordon MD On:5/30/2024 6:15 AM      CT Head Without Contrast    Result Date: 5/30/2024  Senescent changes, progressive since the prior exam. Mild left mastoid effusion is present. No acute findings in the brain.    Electronically Signed By-Dr. Mark Gordon MD On:5/30/2024 6:06 AM      XR Shoulder 2+ View Left    Result Date: 5/30/2024  Mild degenerative disease. No acute findings.  Electronically Signed By-Dr. Mark Gordon MD On:5/30/2024 4:43 AM      XR Hip With or Without Pelvis 2 - 3 View Left    Result Date: 5/30/2024  ORIF of the left hip. Degenerative disease in both hips. No dislocation. There is an age-indeterminate left inferior pubic ramus fracture. This could be assessed with CT if indicated.  Electronically Signed By-Dr. Mark Gordon MD On:5/30/2024 4:41 AM      XR Chest 1 View    Result Date: 5/30/2024  Chronic changes in the lungs with no pneumothorax identified. There are multiple left-sided rib fractures that are most likely chronic. Correlate with site of injury.  Electronically Signed By-Dr. Mark Gordon MD On:5/30/2024 4:38 AM      XR Elbow 3+ View Left    Result Date: 5/30/2024  Negative left  elbow.  Electronically Signed By-Dr. Mark Gordon MD On:5/30/2024 4:36 AM      XR Knee 3 View Left    Result Date: 5/30/2024  Arthroplasty changes. No acute findings.  Electronically Signed By-Dr. Mark Gordon MD On:5/30/2024 4:34 AM         [x]  EKG/Telemetry   No orders to display       [x]  Cardiology/Vascular   []  Pathology  [x]  Old records  []  Other:    Assessment & Plan   Assessment / Plan     Assessment/Plan:      Assessment:  Acute encephalopathy likely related to uncontrolled hypertension  Hypertensive encephalopathy  Mechanical fall  Hypothyroidism  Contusions to face  Transaminitis  Chronic diastolic CHF  Dyslipidemia  Hypothyroidism  Physical deconditioning  Debility  Aortic valve insufficiency  Mitral valve regurgitation    Plan:  Labs and imaging reviewed  Continue PT/OT  Increase metoprolol to 50 mg twice a day  Minimize narcotic use  Delirium precautions  Restart Lasix 20 mg daily  Continue levothyroxine 25 mcg daily  Continue eyedrops  Continue tamsulosin 0.4 mg daily  Stop IV fluids  Encourage oral intake  Follow-up progression of contusions  A.m. labs  DNR  DVT prophylaxis with Lovenox  Clinical course will dictate further management  Discussed with nurse at the bedside    DVT prophylaxis:  Medical DVT prophylaxis orders are present.        CODE STATUS:   Level Of Support Discussed With: Health Care Surrogate  Code Status (Patient has no pulse and is not breathing): No CPR (Do Not Attempt to Resuscitate)  Medical Interventions (Patient has pulse or is breathing): Full Support        Electronically signed by Arnaud Villaseñor MD, 5/31/2024, 15:48 EDT.    Portions of this documentation were transcribed electronically from a voice recognition software.  I confirm all data accurately represents the service(s) I performed at today's visit.

## 2024-05-31 NOTE — PLAN OF CARE
Goal Outcome Evaluation:      ASSESSMENT/ PLAN OF CARE:  Pt presents with limitations, noted below, that impede patient's ability to tolerate least restrictive diet safely and independently. The skills of a therapist will be required to safely and effectively implement the following treatment plan to restore maximal level of function.    PROBLEMS:  1.  Risk of aspiration, swallow delay   TREATMENT: Speech therapy for dysphagia, education of strategies and tolerance of least restrictive diet.    FREQUENCY/DURATION: Daily, 5 days a week    REHAB POTENTIAL:  Pt has good rehab potential.  The following limitations may influence improvement/ length of tx medical status.    RECOMMENDATIONS:   1.   DIET: Mechanical soft solids, nectar thickened liquids    2.  POSITION: Upright for all p.o. intake, 30 minutes following    3.  COMPENSATORY STRATEGIES: Assist for meal set up, small bites and sips, controlled drink, meds in applesauce or pudding.               Anticipated Discharge Disposition (SLP): anticipate therapy at next level of care

## 2024-05-31 NOTE — PLAN OF CARE
Goal Outcome Evaluation:  Plan of Care Reviewed With: patient, daughter        Progress: no change  Outcome Evaluation: Patient vital signs stable, aside from slightly elevated blood pressure early in shift (improved in subsequent vitals collections). Patient generally alert to self. Patient complains of pain with almost any movement. Administered continuous IV fluids, scheduled meds and PRN Ultram per MAR. Patient slept well following pain medication. Plan of care to continue.

## 2024-05-31 NOTE — PLAN OF CARE
Goal Outcome Evaluation:  Plan of Care Reviewed With: patient        Progress: no change (First session for evaluation)  Outcome Evaluation: Patient presents with limitations of balance, strength, endurance/activity tolerance and cognition/safety awareness which impede her ability to perform ADL and transfers as prior.  The skills of a therapist will be required to safely and effectively implement treatment plan to restore maximal level of function.      Anticipated Discharge Disposition (OT): sub acute care setting

## 2024-05-31 NOTE — CONSULTS
Purpose of the visit was to evaluate for: goals of care/advanced care planning. Spoke with RN, patient, and family and discussed goals of care, resuscitation status, and clarify code status.      Assessment: Met with Ms Nguyen in her room. I introduced myself and my roll with palliative care. Ms Nguyen is alert and oriented with intermit confusion.   She has ACP documents on file with designated HCS.   She had DNR/DNI noted. She tells me she has been on this earth a long time and is ready to meet the Lord.   She has no complaints of pain or discomfort. No shortness of air  She and her daughter tell me they plans for discharge is to return to [facility] for rehab and then to Long term care.    Recommendations/Plan: Further decisions to be made ending clinical course    Tasks Completed: Code Status clarification and Emotional Support.    Palliative care will continue to follow and support   Rose GUERRA RN, BSN  Palliative Care  .

## 2024-05-31 NOTE — PLAN OF CARE
Problem: Adult Inpatient Plan of Care  Goal: Plan of Care Review  Outcome: Ongoing, Progressing  Goal: Patient-Specific Goal (Individualized)  Outcome: Ongoing, Progressing  Goal: Absence of Hospital-Acquired Illness or Injury  Outcome: Ongoing, Progressing  Intervention: Identify and Manage Fall Risk  Goal: Optimal Comfort and Wellbeing  Outcome: Ongoing, Progressing  Intervention: Provide Person-Centered Care  Goal: Readiness for Transition of Care  Outcome: Ongoing, Progressing     Problem: Skin Injury Risk Increased  Goal: Skin Health and Integrity  Outcome: Ongoing, Progressing   Goal Outcome Evaluation:

## 2024-05-31 NOTE — SIGNIFICANT NOTE
05/31/24 1423   Plan   Plan Patient's insurance has been updated to MEDICARE PART A/PART B per financial counselors.

## 2024-06-01 LAB
ALBUMIN SERPL-MCNC: 3.3 G/DL (ref 3.5–5.2)
ALP SERPL-CCNC: 79 U/L (ref 39–117)
ALT SERPL W P-5'-P-CCNC: 30 U/L (ref 1–33)
ANION GAP SERPL CALCULATED.3IONS-SCNC: 10.6 MMOL/L (ref 5–15)
AST SERPL-CCNC: 17 U/L (ref 1–32)
BASOPHILS # BLD AUTO: 0.04 10*3/MM3 (ref 0–0.2)
BASOPHILS NFR BLD AUTO: 0.4 % (ref 0–1.5)
BILIRUB CONJ SERPL-MCNC: 0.2 MG/DL (ref 0–0.3)
BILIRUB INDIRECT SERPL-MCNC: 0.5 MG/DL
BILIRUB SERPL-MCNC: 0.7 MG/DL (ref 0–1.2)
BUN SERPL-MCNC: 6 MG/DL (ref 8–23)
BUN/CREAT SERPL: 10.7 (ref 7–25)
CALCIUM SPEC-SCNC: 8.3 MG/DL (ref 8.2–9.6)
CHLORIDE SERPL-SCNC: 102 MMOL/L (ref 98–107)
CO2 SERPL-SCNC: 25.4 MMOL/L (ref 22–29)
CREAT SERPL-MCNC: 0.56 MG/DL (ref 0.57–1)
DEPRECATED RDW RBC AUTO: 45.3 FL (ref 37–54)
EGFRCR SERPLBLD CKD-EPI 2021: 82.6 ML/MIN/1.73
EOSINOPHIL # BLD AUTO: 0.03 10*3/MM3 (ref 0–0.4)
EOSINOPHIL NFR BLD AUTO: 0.3 % (ref 0.3–6.2)
ERYTHROCYTE [DISTWIDTH] IN BLOOD BY AUTOMATED COUNT: 14.8 % (ref 12.3–15.4)
GLUCOSE SERPL-MCNC: 106 MG/DL (ref 65–99)
HCT VFR BLD AUTO: 36.5 % (ref 34–46.6)
HGB BLD-MCNC: 11.6 G/DL (ref 12–15.9)
IMM GRANULOCYTES # BLD AUTO: 0.04 10*3/MM3 (ref 0–0.05)
IMM GRANULOCYTES NFR BLD AUTO: 0.4 % (ref 0–0.5)
LYMPHOCYTES # BLD AUTO: 1.68 10*3/MM3 (ref 0.7–3.1)
LYMPHOCYTES NFR BLD AUTO: 17.9 % (ref 19.6–45.3)
MAGNESIUM SERPL-MCNC: 1.7 MG/DL (ref 1.7–2.3)
MCH RBC QN AUTO: 27 PG (ref 26.6–33)
MCHC RBC AUTO-ENTMCNC: 31.8 G/DL (ref 31.5–35.7)
MCV RBC AUTO: 85.1 FL (ref 79–97)
MONOCYTES # BLD AUTO: 0.76 10*3/MM3 (ref 0.1–0.9)
MONOCYTES NFR BLD AUTO: 8.1 % (ref 5–12)
NEUTROPHILS NFR BLD AUTO: 6.84 10*3/MM3 (ref 1.7–7)
NEUTROPHILS NFR BLD AUTO: 72.9 % (ref 42.7–76)
NRBC BLD AUTO-RTO: 0 /100 WBC (ref 0–0.2)
PHOSPHATE SERPL-MCNC: 2.9 MG/DL (ref 2.5–4.5)
PLATELET # BLD AUTO: 248 10*3/MM3 (ref 140–450)
PMV BLD AUTO: 10.1 FL (ref 6–12)
POTASSIUM SERPL-SCNC: 3.6 MMOL/L (ref 3.5–5.2)
PROT SERPL-MCNC: 5.5 G/DL (ref 6–8.5)
RBC # BLD AUTO: 4.29 10*6/MM3 (ref 3.77–5.28)
SODIUM SERPL-SCNC: 138 MMOL/L (ref 136–145)
WBC NRBC COR # BLD AUTO: 9.39 10*3/MM3 (ref 3.4–10.8)

## 2024-06-01 PROCEDURE — 84100 ASSAY OF PHOSPHORUS: CPT | Performed by: FAMILY MEDICINE

## 2024-06-01 PROCEDURE — 80048 BASIC METABOLIC PNL TOTAL CA: CPT | Performed by: FAMILY MEDICINE

## 2024-06-01 PROCEDURE — 80076 HEPATIC FUNCTION PANEL: CPT | Performed by: FAMILY MEDICINE

## 2024-06-01 PROCEDURE — 83735 ASSAY OF MAGNESIUM: CPT | Performed by: FAMILY MEDICINE

## 2024-06-01 PROCEDURE — 99232 SBSQ HOSP IP/OBS MODERATE 35: CPT | Performed by: FAMILY MEDICINE

## 2024-06-01 PROCEDURE — 85025 COMPLETE CBC W/AUTO DIFF WBC: CPT | Performed by: FAMILY MEDICINE

## 2024-06-01 RX ADMIN — TAMSULOSIN HYDROCHLORIDE 0.4 MG: 0.4 CAPSULE ORAL at 09:02

## 2024-06-01 RX ADMIN — METOPROLOL TARTRATE 50 MG: 50 TABLET, FILM COATED ORAL at 09:02

## 2024-06-01 RX ADMIN — FUROSEMIDE 20 MG: 20 TABLET ORAL at 09:02

## 2024-06-01 RX ADMIN — Medication 10 ML: at 09:05

## 2024-06-01 RX ADMIN — LEVOTHYROXINE SODIUM 25 MCG: 0.03 TABLET ORAL at 09:01

## 2024-06-01 RX ADMIN — ACETAMINOPHEN 650 MG: 325 TABLET ORAL at 18:13

## 2024-06-01 NOTE — PROGRESS NOTES
[facility]   Hospitalist Progress Note  Date: 2024  Patient Name: Keyana Nguyen  : 1925  MRN: [id number]  Date of admission: 2024      Subjective   Subjective     Chief complaint: Altered mental status    Summary:  89-year-old female with history of hypertension, dyslipidemia, hypothyroidism, recent left hip fracture status postrepair in 2024, hospitalized on 2024 for chief complaint of worsening confusion, at baseline she is alert and oriented x 3, workup ongoing for encephalopathy, no clear infectious process, MRI brain unremarkable, urinalysis not suggestive of UTI, could be hypertensive encephalopathy with blood pressures initially in the 200s over 100s, as blood pressures have been improving, patient's mentation also has been improving some.  Medications held including Marinol.    Interval follow-up: Seen and examined this morning, no acute distress, no acute major overnight events, doing better, weak and fatigued.  Intermittent nausea.  Still tolerating oral intake.  Appetite remains poor.  Creatinine 0.56, BUN 6, potassium 3.6, sodium 138, white blood cell count 9000, hemoglobin 11.6.  Needs to ambulate more.    Review of systems:  All systems reviewed negative except for weakness, fatigue, bruising to the face, intermittent confusion    Objective   Objective     Vitals:   Temp:  [97.2 °F (36.2 °C)-98.7 °F (37.1 °C)] 98.1 °F (36.7 °C)  Heart Rate:  [53-70] 53  Resp:  [16-18] 16  BP: (147-173)/(64-97) 148/64  Physical Exam      Constitutional: Awake, alert, no acute distress   Eyes: Pupils equal, sclerae anicteric, no conjunctival injection   HENT: Traumatic to bruising on her face particular around her left eye, mucous membranes moist   Neck: Supple, full range of motion   Respiratory: Clear to auscultation bilaterally, nonlabored respirations    Cardiovascular: RRR, no murmurs, rubs, or gallops, palpable pedal pulses bilaterally   Gastrointestinal: Positive bowel  sounds, soft, nontender, nondistended   Musculoskeletal: No bilateral ankle edema, no clubbing or cyanosis to extremities   Psychiatric: Appropriate affect, cooperative   Neurologic: Alert and awake to her surroundings, strength symmetric in all extremities with generalized weakness throughout, Cranial Nerves grossly intact to confrontation, speech clear   Skin: No rashes visible on exposed skin    Result Review    Result Review:  I have personally reviewed the pertinent results from the past 24 hours to 6/1/2024 17:29 EDT and agree with these findings:  [x]  Laboratory   CBC          3/18/2024    04:00 5/30/2024    04:04 6/1/2024    05:10   CBC   WBC 8.13  10.29  9.39    RBC 3.50  4.64  4.29    Hemoglobin 10.1  12.7  11.6    Hematocrit 33.0  39.5  36.5    MCV 94.3  85.1  85.1    MCH 28.9  27.4  27.0    MCHC 30.6  32.2  31.8    RDW 16.1  14.7  14.8    Platelets 295  246  248      BMP          5/30/2024    04:04 5/31/2024    04:57 6/1/2024    05:10   BMP   BUN 10  6  6    Creatinine 0.71  0.56  0.56    Sodium 141  139  138    Potassium 3.5  3.6  3.6    Chloride 104  105  102    CO2 27.8  25.8  25.4    Calcium 8.8  8.5  8.3      LIVER FUNCTION TESTS:      Lab 06/01/24  0510 05/31/24  0457 05/30/24  0404   TOTAL PROTEIN 5.5* 5.7* 6.7   ALBUMIN 3.3* 3.3* 3.8   GLOBULIN  --  2.4 2.9   ALT (SGPT) 30 40* 68*   AST (SGOT) 17 23 94*   BILIRUBIN 0.7 0.8 0.6   INDIRECT BILIRUBIN 0.5  --   --    BILIRUBIN DIRECT 0.2  --   --    ALK PHOS 79 82 91       [x]  Microbiology   Microbiology Results (last 10 days)       Procedure Component Value - Date/Time    Blood Culture - Blood, Arm, Left [[id number]]  (Normal) Collected: 05/30/24 0404    Lab Status: Preliminary result Specimen: Blood from Arm, Left Updated: 06/01/24 0415     Blood Culture No growth at 2 days    Blood Culture - Blood, Arm, Right [[id number]]  (Normal) Collected: 05/30/24 0404    Lab Status: Preliminary result Specimen: Blood from Arm, Right Updated: 06/01/24 8253      Blood Culture No growth at 2 days              [x]  Radiology MRI Brain With & Without Contrast    Result Date: 5/30/2024  Impression: MR examination of the brain without and with IV contrast demonstrating mild atrophy and white matter changes.  No acute intracranial abnormality is seen.    Electronically Signed By-ELISEO LAWTON MD On:5/30/2024 6:12 PM      CT Cervical Spine Without Contrast    Result Date: 5/30/2024  Technically degraded exam as above. There is advanced cervical degenerative disease with osteopenia. No definite acute cervical spine fracture.    Electronically Signed By-Dr. Mark Gordon MD On:5/30/2024 6:15 AM      CT Head Without Contrast    Result Date: 5/30/2024  Senescent changes, progressive since the prior exam. Mild left mastoid effusion is present. No acute findings in the brain.    Electronically Signed By-Dr. Mark Gordon MD On:5/30/2024 6:06 AM      XR Shoulder 2+ View Left    Result Date: 5/30/2024  Mild degenerative disease. No acute findings.  Electronically Signed By-Dr. Mark Gordon MD On:5/30/2024 4:43 AM      XR Hip With or Without Pelvis 2 - 3 View Left    Result Date: 5/30/2024  ORIF of the left hip. Degenerative disease in both hips. No dislocation. There is an age-indeterminate left inferior pubic ramus fracture. This could be assessed with CT if indicated.  Electronically Signed By-Dr. Mark Gordon MD On:5/30/2024 4:41 AM      XR Chest 1 View    Result Date: 5/30/2024  Chronic changes in the lungs with no pneumothorax identified. There are multiple left-sided rib fractures that are most likely chronic. Correlate with site of injury.  Electronically Signed By-Dr. Mark Gordon MD On:5/30/2024 4:38 AM      XR Elbow 3+ View Left    Result Date: 5/30/2024  Negative left elbow.  Electronically Signed By-Dr. Mark Gordon MD On:5/30/2024 4:36 AM      XR Knee 3 View Left    Result Date: 5/30/2024  Arthroplasty changes. No acute findings.  Electronically Signed By-  Mark Gordon MD On:5/30/2024 4:34 AM         [x]  EKG/Telemetry   No orders to display       [x]  Cardiology/Vascular   []  Pathology  [x]  Old records  []  Other:    Assessment & Plan   Assessment / Plan     Assessment/Plan:  Assessment:  Acute encephalopathy likely related to uncontrolled hypertension  Hypertensive encephalopathy  Mechanical fall  Hypothyroidism  Contusions to face  Transaminitis  Chronic diastolic CHF  Dyslipidemia  Hypothyroidism  Physical deconditioning  Debility  Aortic valve insufficiency  Mitral valve regurgitation    Plan:  Labs and imaging reviewed  Continue PT/OT  Ambulate  Continue metoprolol to 50 mg twice a day  Minimize narcotic use  Delirium precautions  Continue Lasix 20 mg daily  Continue levothyroxine 25 mcg daily  Continue eyedrops  Continue tamsulosin 0.4 mg daily  Encourage oral intake  Follow-up progression of contusions  A.m. labs  DNR  DVT prophylaxis with Lovenox  Clinical course will dictate further management  Discussed with nurse at the bedside        DVT prophylaxis:  Medical DVT prophylaxis orders are present.        CODE STATUS:   Level Of Support Discussed With: Health Care Surrogate  Code Status (Patient has no pulse and is not breathing): No CPR (Do Not Attempt to Resuscitate)  Medical Interventions (Patient has pulse or is breathing): Full Support        Electronically signed by Arnaud Villaseñor MD, 6/1/2024, 17:29 EDT.    Portions of this documentation were transcribed electronically from a voice recognition software.  I confirm all data accurately represents the service(s) I performed at today's visit.

## 2024-06-01 NOTE — PLAN OF CARE
Goal Outcome Evaluation:      Patient was able to walk 10 feet with walker and assist x1.

## 2024-06-01 NOTE — CONSULTS
"Nutrition Services    Patient Name: Keyana Nguyen  YOB: 1925  MRN: [id number]  Admission date: 5/30/2024      CLINICAL NUTRITION ASSESSMENT      Reason for Assessment  MST Score 2+ and BMI     H&P:  Past Medical History:   Diagnosis Date    Aortic regurgitation     Atrial fibrillation     Breast cancer     Essential hypertension     Fatigue     Herpes zoster with complication 10/3/2016    Hypothyroidism     Irritable bowel syndrome     Mitral valve insufficiency     Nonrheumatic aortic (valve) insufficiency     Osteopenia of the elderly     Postmenopausal status     Vitamin B12 deficiency     Vitamin D deficiency         Current Problems:   Active Hospital Problems    Diagnosis     **Altered mental state         Nutrition/Diet History         Narrative   Family was helping to feed pt at my visit, they both provide information. Pt reports that her appetite is good but the meals are too big for her. Family reports that pt's weight has actually increased recently (no significant loss per chart review). Speech therapy recommends a mechanical soft diet with nectar thick liquids. Family reports that they have been aware of swallowing difficulties for awhile and plan to ask MD for thin liquids as they are aware of the risks. Pt normally consumes ensure max at home, dicussed ordering magic cup while pt is still on thickened liquids, pt is agreeable. +BM. Per nursing documentation, pt is consuming 0-75% of meals.     See NFPE below, pt meets criteria for severe malnutrition.      Anthropometrics        Current Height, Weight Height: 154.9 cm (60.98\")  Weight: 43.1 kg (95 lb 0.3 oz)   Current BMI Body mass index is 17.96 kg/m².   BMI Classification Underweight   % IBW 83%    Adjusted Body Weight (ABW)    Weight Hx  Wt Readings from Last 30 Encounters:   05/30/24 0259 43.1 kg (95 lb 0.3 oz)   01/11/24 1354 44.5 kg (98 lb)   09/26/23 1416 43.6 kg (96 lb 3.2 oz)   09/18/23 1333 43.5 kg (95 lb 12.8 oz) "   06/27/23 1255 43.4 kg (95 lb 11.2 oz)   06/06/23 1415 44.9 kg (99 lb)   04/25/23 1354 42.2 kg (93 lb)   04/04/23 1313 43.2 kg (95 lb 4.8 oz)   01/03/23 1257 41.2 kg (90 lb 12.8 oz)   10/20/22 1307 38.6 kg (85 lb)   10/03/22 1056 38.6 kg (85 lb)   08/25/22 1459 44 kg (97 lb)   08/19/22 1453 45.8 kg (101 lb)   08/17/22 1451 45.8 kg (101 lb)   07/19/22 1346 44.5 kg (98 lb)   05/13/21 0847 49.8 kg (109 lb 12.8 oz)   05/09/19 1235 51.8 kg (114 lb 3.2 oz)   05/10/18 1247 55.2 kg (121 lb 12.8 oz)   02/09/18 1128 55.7 kg (122 lb 12.8 oz)   12/19/17 1546 54.4 kg (120 lb)   08/31/17 1233 53.7 kg (118 lb 6.4 oz)   08/03/17 0830 53.5 kg (118 lb)   06/05/17 1302 54.8 kg (120 lb 12.8 oz)   05/10/17 1206 53.5 kg (118 lb)   02/09/17 1444 52.4 kg (115 lb 9.6 oz)   10/17/16 1407 53.8 kg (118 lb 9.6 oz)   10/03/16 1044 53.7 kg (118 lb 6.4 oz)   06/01/16 1450 54.4 kg (120 lb)   05/10/16 1256 54.4 kg (120 lb)   03/22/16 1150 53.5 kg (118 lb)          Wt Change Observation 4% decrease over 1 year      Estimated/Assessed Needs  Estimated Needs based on: Current Body Weight       Energy Requirements 30-35 kcal/kg    EST Needs (kcal/day) 5973-9093 kcal/day        Protein Requirements 1.2-1.5 g.kg   EST Daily Needs (g/day) 52-65 g/day        Fluid Requirements 1 ml/kcal    Estimated Needs (mL/day) 1505 ml/day      Labs/Medications         Pertinent Labs Reviewed.   Results from last 7 days   Lab Units 06/01/24  0510 05/31/24  0457 05/30/24  0404   SODIUM mmol/L 138 139 141   POTASSIUM mmol/L 3.6 3.6 3.5   CHLORIDE mmol/L 102 105 104   CO2 mmol/L 25.4 25.8 27.8   BUN mg/dL 6* 6* 10   CREATININE mg/dL 0.56* 0.56* 0.71   CALCIUM mg/dL 8.3 8.5 8.8   BILIRUBIN mg/dL 0.7 0.8 0.6   ALK PHOS U/L 79 82 91   ALT (SGPT) U/L 30 40* 68*   AST (SGOT) U/L 17 23 94*   GLUCOSE mg/dL 106* 114* 135*     Results from last 7 days   Lab Units 06/01/24  0510 05/31/24  0457   MAGNESIUM mg/dL 1.7 1.8   PHOSPHORUS mg/dL 2.9 3.0   HEMOGLOBIN g/dL 11.6*  --   "  HEMATOCRIT % 36.5  --      No results found for: \"COVID19\"  No results found for: \"HGBA1C\"      Pertinent Medications Reviewed.     Malnutrition Severity Assessment      Patient meets criteria for : Severe Malnutrition  Malnutrition Type (Last 8 Hours)       Malnutrition Severity Assessment       Row Name 06/01/24 0918       Malnutrition Severity Assessment    Malnutrition Type Chronic Disease - Related Malnutrition      Row Name 06/01/24 0918       Muscle Loss    Loss of Muscle Mass Findings Severe    Clavicle Bone Region Severe - protruding prominent bone    Dorsal Hand Region Severe - prominent depression      Row Name 06/01/24 0918       Fat Loss    Subcutaneous Fat Loss Findings Severe    Orbital Region  Severe - pronounced hollowness/depression, dark circles, loose saggy skin      Row Name 06/01/24 0918       Criteria Met (Must meet criteria for severity in at least 2 of these categories: M Wasting, Fat Loss, Fluid, Secondary Signs, Wt. Status, Intake)    Patient meets criteria for  Severe Malnutrition                     Nutrition Diagnosis         Nutrition Dx Problem 1 Severe malnutrition related to inadequate energy Intake as evidenced by  BMI of 17.9, severe fat and muscle wasting      Nutrition Intervention           Current Nutrition Orders & Evaluation of Intake       Current PO Diet Diet: Regular/House; Texture: Mechanical Ground (NDD 2); Fluid Consistency: Nectar Thick   Supplement No active supplement orders           Nutrition Intervention/Prescription        Monitor changes to pt's diet    Order magic cup TID (Each supplement contains 290 kcal, 9g protein)  Monitor progress with SLP         Medical Nutrition Therapy/Nutrition Education          Learner     Readiness Patient and Family  Acceptance     Method     Response Explanation  Verbalizes understanding     Monitor/Evaluation        Monitor PO intake, Supplement intake, Swallow function, Diet advancement     Nutrition Discharge Plan         " Recommend to continue oral nutrition supplements on discharge.      Electronically signed by:  Jaimie Will RD  06/01/24 09:18 EDT

## 2024-06-01 NOTE — PLAN OF CARE
Goal Outcome Evaluation:  Plan of Care Reviewed With: patient, daughter        Progress: no change  Outcome Evaluation: Patient complained of nausea x1 this shift after tramadol given. medicated with zofran with relief of nausea. no further problems or complaints. plan of care ongoing.

## 2024-06-02 PROCEDURE — 25010000002 ENOXAPARIN PER 10 MG: Performed by: INTERNAL MEDICINE

## 2024-06-02 PROCEDURE — 25010000002 ONDANSETRON PER 1 MG: Performed by: INTERNAL MEDICINE

## 2024-06-02 PROCEDURE — 99232 SBSQ HOSP IP/OBS MODERATE 35: CPT | Performed by: FAMILY MEDICINE

## 2024-06-02 RX ADMIN — FUROSEMIDE 20 MG: 20 TABLET ORAL at 08:06

## 2024-06-02 RX ADMIN — Medication 10 ML: at 08:06

## 2024-06-02 RX ADMIN — ONDANSETRON 4 MG: 2 INJECTION INTRAMUSCULAR; INTRAVENOUS at 17:57

## 2024-06-02 RX ADMIN — ACETAMINOPHEN 650 MG: 325 TABLET ORAL at 23:06

## 2024-06-02 RX ADMIN — TAMSULOSIN HYDROCHLORIDE 0.4 MG: 0.4 CAPSULE ORAL at 08:05

## 2024-06-02 RX ADMIN — ENOXAPARIN SODIUM 30 MG: 100 INJECTION SUBCUTANEOUS at 20:46

## 2024-06-02 RX ADMIN — LATANOPROST 1 DROP: 50 SOLUTION OPHTHALMIC at 10:25

## 2024-06-02 RX ADMIN — METOPROLOL TARTRATE 50 MG: 50 TABLET, FILM COATED ORAL at 20:46

## 2024-06-02 RX ADMIN — LEVOTHYROXINE SODIUM 25 MCG: 0.03 TABLET ORAL at 08:04

## 2024-06-02 RX ADMIN — METOPROLOL TARTRATE 50 MG: 50 TABLET, FILM COATED ORAL at 08:05

## 2024-06-02 RX ADMIN — Medication 10 ML: at 20:46

## 2024-06-02 NOTE — PROGRESS NOTES
[facility]   Hospitalist Progress Note  Date: 2024  Patient Name: Keyana Nguyen  : 1925  MRN: [id number]  Date of admission: 2024      Subjective   Subjective     Chief complaint: Altered mental status    Summary:  89-year-old female with history of hypertension, dyslipidemia, hypothyroidism, recent left hip fracture status postrepair in 2024, hospitalized on 2024 for chief complaint of worsening confusion, at baseline she is alert and oriented x 3, workup ongoing for encephalopathy, no clear infectious process, MRI brain unremarkable, urinalysis not suggestive of UTI, could be hypertensive encephalopathy with blood pressures initially in the 200s over 100s, as blood pressures have been improving, patient's mentation also has been improving some.  Medications held including Marinol.    Interval follow-up: Seen and examined this morning, no acute distress, no acute major overnight events, doing better, weak and fatigued.  Appetite slowly improved.  Remains weak and fatigued.  Ecchymosis improving.  Still confused intermittently.    Review of systems:  All systems reviewed negative except for weakness, fatigue, bruising to the face, intermittent confusion    Objective   Objective     Vitals:   Temp:  [97.2 °F (36.2 °C)-98.1 °F (36.7 °C)] 97.2 °F (36.2 °C)  Heart Rate:  [64-70] 66  Resp:  [18-20] 18  BP: (139-178)/(66-94) 178/87  Physical Exam    Constitutional: Awake, alert, no acute distress eating breakfast   Eyes: Pupils equal, sclerae anicteric, no conjunctival injection   HENT: Traumatic to bruising on her face particular around her left eye, mucous membranes moist   Neck: Supple, full range of motion   Respiratory: Clear to auscultation bilaterally, nonlabored respirations    Cardiovascular: RRR, no murmurs, rubs, or gallops, palpable pedal pulses bilaterally   Gastrointestinal: Positive bowel sounds, soft, nontender, nondistended   Musculoskeletal: No bilateral ankle  edema, no clubbing or cyanosis to extremities   Psychiatric: Appropriate affect, cooperative   Neurologic: Alert and awake to her surroundings, strength symmetric in all extremities with generalized weakness throughout, Cranial Nerves grossly intact to confrontation, speech clear   Skin: No rashes visible on exposed skin    Result Review    Result Review:  I have personally reviewed the pertinent results from the past 24 hours to 6/2/2024 18:10 EDT and agree with these findings:  [x]  Laboratory   CBC          3/18/2024    04:00 5/30/2024    04:04 6/1/2024    05:10   CBC   WBC 8.13  10.29  9.39    RBC 3.50  4.64  4.29    Hemoglobin 10.1  12.7  11.6    Hematocrit 33.0  39.5  36.5    MCV 94.3  85.1  85.1    MCH 28.9  27.4  27.0    MCHC 30.6  32.2  31.8    RDW 16.1  14.7  14.8    Platelets 295  246  248      BMP          5/30/2024    04:04 5/31/2024    04:57 6/1/2024    05:10   BMP   BUN 10  6  6    Creatinine 0.71  0.56  0.56    Sodium 141  139  138    Potassium 3.5  3.6  3.6    Chloride 104  105  102    CO2 27.8  25.8  25.4    Calcium 8.8  8.5  8.3      LIVER FUNCTION TESTS:      Lab 06/01/24  0510 05/31/24  0457 05/30/24 0404   TOTAL PROTEIN 5.5* 5.7* 6.7   ALBUMIN 3.3* 3.3* 3.8   GLOBULIN  --  2.4 2.9   ALT (SGPT) 30 40* 68*   AST (SGOT) 17 23 94*   BILIRUBIN 0.7 0.8 0.6   INDIRECT BILIRUBIN 0.5  --   --    BILIRUBIN DIRECT 0.2  --   --    ALK PHOS 79 82 91       [x]  Microbiology   Microbiology Results (last 10 days)       Procedure Component Value - Date/Time    Blood Culture - Blood, Arm, Left [[id number]]  (Normal) Collected: 05/30/24 0404    Lab Status: Preliminary result Specimen: Blood from Arm, Left Updated: 06/02/24 0415     Blood Culture No growth at 3 days    Blood Culture - Blood, Arm, Right [[id number]]  (Normal) Collected: 05/30/24 0404    Lab Status: Preliminary result Specimen: Blood from Arm, Right Updated: 06/02/24 2299     Blood Culture No growth at 3 days              [x]  Radiology MRI Brain  With & Without Contrast    Result Date: 5/30/2024  Impression: MR examination of the brain without and with IV contrast demonstrating mild atrophy and white matter changes.  No acute intracranial abnormality is seen.    Electronically Signed By-ELISEO LAWTON MD On:5/30/2024 6:12 PM      CT Cervical Spine Without Contrast    Result Date: 5/30/2024  Technically degraded exam as above. There is advanced cervical degenerative disease with osteopenia. No definite acute cervical spine fracture.    Electronically Signed By-Dr. Mark Gordon MD On:5/30/2024 6:15 AM      CT Head Without Contrast    Result Date: 5/30/2024  Senescent changes, progressive since the prior exam. Mild left mastoid effusion is present. No acute findings in the brain.    Electronically Signed By-Dr. Mark Gordon MD On:5/30/2024 6:06 AM      XR Shoulder 2+ View Left    Result Date: 5/30/2024  Mild degenerative disease. No acute findings.  Electronically Signed By-Dr. Mark Gordon MD On:5/30/2024 4:43 AM      XR Hip With or Without Pelvis 2 - 3 View Left    Result Date: 5/30/2024  ORIF of the left hip. Degenerative disease in both hips. No dislocation. There is an age-indeterminate left inferior pubic ramus fracture. This could be assessed with CT if indicated.  Electronically Signed By-Dr. Mark Gordon MD On:5/30/2024 4:41 AM      XR Chest 1 View    Result Date: 5/30/2024  Chronic changes in the lungs with no pneumothorax identified. There are multiple left-sided rib fractures that are most likely chronic. Correlate with site of injury.  Electronically Signed By-Dr. Mark Gordon MD On:5/30/2024 4:38 AM      XR Elbow 3+ View Left    Result Date: 5/30/2024  Negative left elbow.  Electronically Signed By-Dr. Mark Gordon MD On:5/30/2024 4:36 AM      XR Knee 3 View Left    Result Date: 5/30/2024  Arthroplasty changes. No acute findings.  Electronically Signed By-Dr. Mark Gordon MD On:5/30/2024 4:34 AM         [x]  EKG/Telemetry   No  orders to display       [x]  Cardiology/Vascular   []  Pathology  [x]  Old records  []  Other:    Assessment & Plan   Assessment / Plan     Assessment/Plan:    Assessment:  Acute encephalopathy likely related to uncontrolled hypertension  Hypertensive encephalopathy  Mechanical fall  Hypothyroidism  Contusions to face  Transaminitis  Chronic diastolic CHF  Dyslipidemia  Hypothyroidism  Physical deconditioning  Debility  Aortic valve insufficiency  Mitral valve regurgitation    Plan:  Labs and imaging reviewed  Continue PT/OT  Ambulate the hallway  Continue metoprolol to 50 mg twice a day  Minimize narcotic use  Delirium precautions  Continue Lasix 20 mg daily  Continue levothyroxine 25 mcg daily  Continue eyedrops  Continue tamsulosin 0.4 mg daily  Encourage oral intake  Follow-up progression of contusions  A.m. labs  DNR  DVT prophylaxis with Lovenox  Clinical course will dictate further management  Discussed with nurse at the bedside  May be discharged to assisted living tomorrow        DVT prophylaxis:  Medical DVT prophylaxis orders are present.        CODE STATUS:   Level Of Support Discussed With: Health Care Surrogate  Code Status (Patient has no pulse and is not breathing): No CPR (Do Not Attempt to Resuscitate)  Medical Interventions (Patient has pulse or is breathing): Full Support        Electronically signed by Arnaud Villaseñor MD, 6/2/2024, 18:10 EDT.    Portions of this documentation were transcribed electronically from a voice recognition software.  I confirm all data accurately represents the service(s) I performed at today's visit.

## 2024-06-02 NOTE — PLAN OF CARE
Goal Outcome Evaluation:      Patient alert to self and birthday this shift, pleasantly confused at times. No complaints of pain this shift. Complaint of nausea before dinner treated per MAR and patient reports no more nausea and was able to finish dinner. No new orders at this time. Plan of care ongoing.

## 2024-06-02 NOTE — PLAN OF CARE
Goal Outcome Evaluation:  Plan of Care Reviewed With: patient        Progress: no change  Outcome Evaluation: No changes this shift. plan of care ongoing.

## 2024-06-03 VITALS
RESPIRATION RATE: 20 BRPM | HEIGHT: 61 IN | HEART RATE: 68 BPM | DIASTOLIC BLOOD PRESSURE: 80 MMHG | WEIGHT: 95.02 LBS | TEMPERATURE: 98.2 F | OXYGEN SATURATION: 96 % | SYSTOLIC BLOOD PRESSURE: 160 MMHG | BODY MASS INDEX: 17.94 KG/M2

## 2024-06-03 PROBLEM — N83.8 OVARIAN MASS: Chronic | Status: ACTIVE | Noted: 2024-02-04

## 2024-06-03 PROBLEM — R41.82 ALTERED MENTAL STATE: Status: RESOLVED | Noted: 2024-05-30 | Resolved: 2024-06-03

## 2024-06-03 PROBLEM — R13.10 DYSPHAGIA: Status: ACTIVE | Noted: 2024-02-09

## 2024-06-03 PROBLEM — R62.7 FAILURE TO THRIVE IN ADULT: Chronic | Status: ACTIVE | Noted: 2024-02-15
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
## 2024-06-03 PROCEDURE — 99239 HOSP IP/OBS DSCHRG MGMT >30: CPT | Performed by: FAMILY MEDICINE

## 2024-06-03 RX ORDER — METOPROLOL TARTRATE 50 MG/1
50 TABLET, FILM COATED ORAL 2 TIMES DAILY
Qty: 60 TABLET | Refills: 0 | Status: SHIPPED | OUTPATIENT
Start: 2024-06-03 | End: 2024-07-03

## 2024-06-03 RX ADMIN — METOPROLOL TARTRATE 50 MG: 50 TABLET, FILM COATED ORAL at 09:41

## 2024-06-03 RX ADMIN — TAMSULOSIN HYDROCHLORIDE 0.4 MG: 0.4 CAPSULE ORAL at 09:41

## 2024-06-03 RX ADMIN — FUROSEMIDE 20 MG: 20 TABLET ORAL at 09:41

## 2024-06-03 RX ADMIN — LEVOTHYROXINE SODIUM 25 MCG: 0.03 TABLET ORAL at 06:03

## 2024-06-03 RX ADMIN — LATANOPROST 1 DROP: 50 SOLUTION OPHTHALMIC at 09:46

## 2024-06-03 RX ADMIN — Medication 10 ML: at 09:41

## 2024-06-03 NOTE — DISCHARGE SUMMARY
[facility]         HOSPITALIST  DISCHARGE SUMMARY    Patient Name: Keyana Nguyen  : 1925  MRN: [id number]    Date of Admission: 2024  Date of Discharge:  6/3/2024    Primary Care Physician: Georgia Zavala DO    Consults       Date and Time Order Name Status Description    2024  6:23 AM Inpatient Hospitalist Consult              Active and Resolved Hospital Problems:    Acute encephalopathy likely related to uncontrolled hypertension  Hypertensive encephalopathy  Mechanical fall  Hypothyroidism  Contusions to face  Transaminitis  Chronic diastolic CHF  Dyslipidemia  Hypothyroidism  Physical deconditioning  Debility  Aortic valve insufficiency  Mitral valve regurgitation    Active Hospital Problems   No active problems to display.      Resolved Hospital Problems    Diagnosis POA    **Altered mental state [R41.82] Yes       Hospital Course     Hospital Course:  89-year-old female with history of hypertension, dyslipidemia, hypothyroidism, recent left hip fracture status postrepair in 2024, hospitalized on 2024 for chief complaint of worsening confusion, at baseline she is alert and oriented x 3, workup ongoing for encephalopathy, no clear infectious process, MRI brain unremarkable, urinalysis not suggestive of UTI, could be hypertensive encephalopathy with blood pressures initially in the 200s over 100s, as blood pressures have been improving, patient's mentation also has been improving some.  Medications held including Marinol.  Mentation improved.  Discharged in hemodynamic stable condition on 6/3/2024 to return back to [facility].  Overall prognosis poor and guarded.  Consider hospice if further decline occurs in the outpatient setting.      Day of Discharge     Vital Signs:  Temp:  [97.2 °F (36.2 °C)-98.2 °F (36.8 °C)] 98 °F (36.7 °C)  Heart Rate:  [58-71] 71  Resp:  [18-22] 22  BP: (148-185)/(65-90) 175/85    Review of systems:  All systems  reviewed negative except for weakness, fatigue, bruising to the face, intermittent confusion    Physical Exam              Constitutional: Awake, alert, pleasant  Eyes: Pupils equal, sclerae anicteric, no conjunctival injection   HENT: Traumatic to bruising on her face particular around her left eye, mucous membranes moist   Neck: Supple, full range of motion   Respiratory: Clear to auscultation bilaterally, nonlabored respirations    Cardiovascular: RRR, no murmurs, rubs, or gallops, palpable pedal pulses bilaterally   Gastrointestinal: Positive bowel sounds, soft, nontender, nondistended   Musculoskeletal: No bilateral ankle edema, no clubbing or cyanosis to extremities   Psychiatric: Appropriate affect, cooperative   Neurologic: Alert and awake to her surroundings, strength symmetric in all extremities with generalized weakness throughout, Cranial Nerves grossly intact to confrontation, speech clear   Skin: No rashes visible on exposed skin      Discharge Details        Discharge Medications        Changes to Medications        Instructions Start Date   acetaminophen 325 MG tablet  Commonly known as: TYLENOL  What changed: Another medication with the same name was removed. Continue taking this medication, and follow the directions you see here.   325 mg, Oral, Every 6 Hours PRN      metoprolol tartrate 50 MG tablet  Commonly known as: LOPRESSOR  What changed:   medication strength  how much to take   50 mg, Oral, 2 Times Daily             Continue These Medications        Instructions Start Date   Cranberry 500 MG capsule   500 mg, Oral, Daily      furosemide 20 MG tablet  Commonly known as: LASIX   20 mg, Oral, Daily      latanoprost 0.005 % ophthalmic solution  Commonly known as: XALATAN   1 drop, Both Eyes, Daily      levothyroxine 25 MCG tablet  Commonly known as: SYNTHROID, LEVOTHROID   25 mcg, Oral, Daily      multivitamin with minerals tablet tablet   1 tablet, Oral, Daily      tamsulosin 0.4 MG capsule 24  hr capsule  Commonly known as: FLOMAX   0.4 mg, Oral, Daily             Stop These Medications      diphenoxylate-atropine 2.5-0.025 MG per tablet  Commonly known as: Lomotil     dronabinol 10 MG capsule  Commonly known as: MARINOL     Klor-Con M20 20 MEQ CR tablet  Generic drug: potassium chloride     potassium chloride 20 MEQ CR tablet  Commonly known as: KLOR-CON M20              Allergies   Allergen Reactions    Now Melatonin Delirium and Confusion       Discharge Disposition:  Skilled Nursing Facility (DC - External)    Diet:  Hospital:  Diet Order   Procedures    Diet: Regular/House; Texture: Soft to Chew (NDD 3); Soft to Chew: Whole Meat; Fluid Consistency: Thin (IDDSI 0)       Discharge Activity:       CODE STATUS:  Code Status and Medical Interventions:   Ordered at: 05/30/24 0833     Level Of Support Discussed With:    Health Care Surrogate     Code Status (Patient has no pulse and is not breathing):    No CPR (Do Not Attempt to Resuscitate)     Medical Interventions (Patient has pulse or is breathing):    Full Support         Future Appointments   Date Time Provider Department Center   7/15/2024 10:30 AM Georgia Zavala DO [facility]       Additional Instructions for the Follow-ups that You Need to Schedule       Discharge Follow-up with PCP   As directed       Currently Documented PCP:    Georgia Zavala DO    PCP Phone Number:    [phone]     Follow Up Details: 3 to 7 days                Pertinent  and/or Most Recent Results     PROCEDURES:   MRI Brain With & Without Contrast    Result Date: 5/30/2024   MRI BRAIN W WO CONTRAST-  Date of Exam: 5/30/2024 5:32 PM  Indication: rule out cva or other acute process, speech difficulties; R41.82-Altered mental status, unspecified; W19.XXXA-Unspecified fall, initial encounter; T14.8XXA-Other injury of unspecified body region, initial encounter. Altered mental status  Comparison: CT scan of the head 5/30/2024  Technique:   Routine multiplanar/multisequence sequence images of the brain were obtained before and after the uneventful administration of MultiHance 8 cc.   Findings: The ventricles, sulci, and cerebellar folia are mildly and diffusely prominent consistent with atrophy.  Ill-defined abnormal T2 bright signal in cerebral white matter is consistent with mild small vessel disease and/or numerous old lacunar infarcts.  No abnormal bright signal is seen on diffusion weighted images. No restricted diffusion is evident.  No abnormal dark signal is seen on magnetic susceptibility sequence sensitive for blood products.  No abnormal patterns of enhancement are seen.  No abnormality of the pituitary or orbits is evident.  The paranasal sinuses are well aerated.  Abnormal T2 bright signal in the left mastoid region is consistent with an inflammatory or postinflammatory process.      Impression: MR examination of the brain without and with IV contrast demonstrating mild atrophy and white matter changes.  No acute intracranial abnormality is seen.    Electronically Signed By-ELISEO LAWTON MD On:5/30/2024 6:12 PM      CT Cervical Spine Without Contrast    Result Date: 5/30/2024  CT CERVICAL SPINE WO CONTRAST-  Date of Exam: 5/30/2024 5:26 AM  Indication: Neck injury after fall.  Comparison: None available.  Technique: Axial CT images were obtained of the cervical spine without contrast administration.  Reconstructed coronal and sagittal images were also obtained. Automated exposure control and iterative construction methods were used.  Findings: Exam is degraded by suboptimal positioning and patient motion, despite repeating the exam. Note is made of fibrotic changes in the lung apices. There is ectasia of the mid aortic arch measuring about 3.6 cm. Atherosclerotic disease is present in the arch and left carotid arteries. There is diffuse osteopenia. There is grade 1 anterolisthesis of C4 on C5 and C7 on T1. There is degenerative disc  disease with disc osteophyte complexes and loss of disc height most pronounced at C5-6 and C6-7 and to a lesser degree at C4-5. There is advanced facet arthropathy throughout the cervical spine. No acute cervical spine fracture is identified.      Technically degraded exam as above. There is advanced cervical degenerative disease with osteopenia. No definite acute cervical spine fracture.    Electronically Signed By-Dr. Mark Gordon MD On:5/30/2024 6:15 AM      CT Head Without Contrast    Result Date: 5/30/2024  CT HEAD WO CONTRAST-  HISTORY: Mental status changes. Fall.  TECHNIQUE: Axial unenhanced head CT with multiplanar reformats. Radiation dose reduction techniques included automated exposure control or exposure modulation based on body size.  COMPARISON: 12/26/2014  FINDINGS: Ventricular size and configuration are normal. No acute infarct or hemorrhage is identified. There are no masses. There is no skull fracture. There is progressive generalized atrophy with chronic small vessel ischemic disease in the white matter. There is an old lacunar infarct in the left thalamus. 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 There also appears to be an old lacunar infarct in the right side of the elsa. Atherosclerotic calcifications are present in the carotid siphons. There is a mild left mastoid effusion.      Senescent changes, progressive since the prior exam. Mild left mastoid effusion is present. No acute findings in the brain.    Electronically Signed By-Dr. Mark Gordon MD On:5/30/2024 6:06 AM      XR Shoulder 2+ View Left    Result Date: 5/30/2024  XR SHOULDER 2+ VW LEFT-: 5/30/2024 4:18 AM  INDICATION: Pain after fall.  COMPARISON: None available.  FINDINGS: 3 views of the left shoulder.   No fracture or dislocation. There is mild AC joint and glenohumeral joint arthropathy. No AC joint separation.  No foreign body.      Mild degenerative disease. No acute findings.  Electronically Signed By-Dr. Flores  MD Evan On:5/30/2024 4:43 AM      XR Hip With or Without Pelvis 2 - 3 View Left    Result Date: 5/30/2024  XR HIP W OR WO PELVIS 2-3 VIEW LEFT-: 5/30/2024 4:18 AM  INDICATION: Hip pain after fall.  COMPARISON: None available.  FINDINGS: AP pelvis and 2 view(s) of the left hip. No hip dislocation or sacroiliac joint diastasis. No bone erosion or destruction. Patient is status post ORIF of the left acetabulum with plates and screws. There is degenerative joint space narrowing in both hips, left worse than right. There is a left inferior pubic ramus fracture that is age indeterminate.      ORIF of the left hip. Degenerative disease in both hips. No dislocation. There is an age-indeterminate left inferior pubic ramus fracture. This could be assessed with CT if indicated.  Electronically Signed By-Dr. Mark Gordon MD On:5/30/2024 4:41 AM      XR Chest 1 View    Result Date: 5/30/2024  AP PORTABLE CHEST  HISTORY: Chest pain. Fall today.  COMPARISON: 12/26/2014  TECHNIQUE: AP portable chest x-ray.  FINDINGS: Right shoulder arthroplasty is present. The heart is mildly enlarged. There is diffuse atherosclerotic calcification in the aorta. There is chronic interstitial scarring throughout both lungs with some biapical pleural thickening. No definite acute infiltrates. No pneumothorax. There are multiple left-sided rib fractures, most likely chronic. Correlate with site of injury.      Chronic changes in the lungs with no pneumothorax identified. There are multiple left-sided rib fractures that are most likely chronic. Correlate with site of injury.  Electronically Signed By-Dr. Mark Gordon MD On:5/30/2024 4:38 AM      XR Elbow 3+ View Left    Result Date: 5/30/2024  XR ELBOW 3+ VW LEFT-: 5/30/2024 4:18 AM  INDICATION: Pain after fall. Skin tear.  COMPARISON: None available.  FINDINGS: 3 views of the left elbow.  No fracture, dislocation, or effusion. No bone erosion or destruction.  No foreign body. Patient is  osteopenic. IV cannula noted in the antecubital fossa.      Negative left elbow.  Electronically Signed By-Dr. Mark Gordon MD On:5/30/2024 4:36 AM      XR Knee 3 View Left    Result Date: 5/30/2024  XR KNEE 3 VW LEFT-: 5/30/2024 4:18 AM  INDICATION: Pain after fall.  COMPARISON: None available.  FINDINGS: 3 view(s) of the left knee.  No fracture, dislocation, or effusion. No bone erosion or destruction.  No foreign body. Patient is status post arthroplasty. No evidence of hardware loosening. Atherosclerotic calcifications are present.      Arthroplasty changes. No acute findings.  Electronically Signed By-Dr. Mark Gordon MD On:5/30/2024 4:34 AM         LAB RESULTS:      Lab 06/01/24 0510 05/31/24 0457 05/30/24 0832 05/30/24 0404   WBC 9.39  --   --  10.29   HEMOGLOBIN 11.6*  --   --  12.7   HEMATOCRIT 36.5  --   --  39.5   PLATELETS 248  --   --  246   NEUTROS ABS 6.84  --   --  8.27*   IMMATURE GRANS (ABS) 0.04  --   --  0.08*   LYMPHS ABS 1.68  --   --  1.10   MONOS ABS 0.76  --   --  0.75   EOS ABS 0.03  --   --  0.04   MCV 85.1  --   --  85.1   PROCALCITONIN  --  0.07  --   --    LACTATE  --   --  1.6 2.1*         Lab 06/01/24 0510 05/31/24 0457 05/30/24 0404   SODIUM 138 139 141   POTASSIUM 3.6 3.6 3.5   CHLORIDE 102 105 104   CO2 25.4 25.8 27.8   ANION GAP 10.6 8.2 9.2   BUN 6* 6* 10   CREATININE 0.56* 0.56* 0.71   EGFR 82.6 82.6 77.0   GLUCOSE 106* 114* 135*   CALCIUM 8.3 8.5 8.8   MAGNESIUM 1.7 1.8  --    PHOSPHORUS 2.9 3.0  --    TSH  --   --  1.480         Lab 06/01/24 0510 05/31/24 0457 05/30/24 0404   TOTAL PROTEIN 5.5* 5.7* 6.7   ALBUMIN 3.3* 3.3* 3.8   GLOBULIN  --  2.4 2.9   ALT (SGPT) 30 40* 68*   AST (SGOT) 17 23 94*   BILIRUBIN 0.7 0.8 0.6   INDIRECT BILIRUBIN 0.5  --   --    BILIRUBIN DIRECT 0.2  --   --    ALK PHOS 79 82 91         Lab 05/30/24  0404   PROBNP 2,506.0*             Lab 05/30/24  0404   VITAMIN B 12 612         Brief Urine Lab Results  (Last result in the past 365  days)        Color   Clarity   Blood   Leuk Est   Nitrite   Protein   CREAT   Urine HCG        05/30/24 0503 Yellow   Clear   Trace   Negative   Negative   Trace                 Microbiology Results (last 10 days)       Procedure Component Value - Date/Time    Blood Culture - Blood, Arm, Left [[id number]]  (Normal) Collected: 05/30/24 0404    Lab Status: Preliminary result Specimen: Blood from Arm, Left Updated: 06/03/24 0415     Blood Culture No growth at 4 days    Blood Culture - Blood, Arm, Right [[id number]]  (Normal) Collected: 05/30/24 0404    Lab Status: Preliminary result Specimen: Blood from Arm, Right Updated: 06/03/24 0415     Blood Culture No growth at 4 days            MRI Brain With & Without Contrast    Result Date: 5/30/2024  Impression: Impression: MR examination of the brain without and with IV contrast demonstrating mild atrophy and white matter changes.  No acute intracranial abnormality is seen.    Electronically Signed By-ELISEO LAWTON MD On:5/30/2024 6:12 PM      CT Cervical Spine Without Contrast    Result Date: 5/30/2024  Impression: Technically degraded exam as above. There is advanced cervical degenerative disease with osteopenia. No definite acute cervical spine fracture.    Electronically Signed By-Dr. Mark Gordon MD On:5/30/2024 6:15 AM      CT Head Without Contrast    Result Date: 5/30/2024  Impression: Senescent changes, progressive since the prior exam. Mild left mastoid effusion is present. No acute findings in the brain.    Electronically Signed By-Dr. Mark Gordon MD On:5/30/2024 6:06 AM      XR Shoulder 2+ View Left    Result Date: 5/30/2024  Impression: Mild degenerative disease. No acute findings.  Electronically Signed By-Dr. Mark Gordon MD On:5/30/2024 4:43 AM      XR Hip With or Without Pelvis 2 - 3 View Left    Result Date: 5/30/2024  Impression: ORIF of the left hip. Degenerative disease in both hips. No dislocation. There is an age-indeterminate left inferior  pubic ramus fracture. This could be assessed with CT if indicated.  Electronically Signed By-Dr. Mark Gordon MD On:5/30/2024 4:41 AM      XR Chest 1 View    Result Date: 5/30/2024  Impression: Chronic changes in the lungs with no pneumothorax identified. There are multiple left-sided rib fractures that are most likely chronic. Correlate with site of injury.  Electronically Signed By-Dr. Mark Gordon MD On:5/30/2024 4:38 AM      XR Elbow 3+ View Left    Result Date: 5/30/2024  Impression: Negative left elbow.  Electronically Signed By-Dr. Mark Gordon MD On:5/30/2024 4:36 AM      XR Knee 3 View Left    Result Date: 5/30/2024  Impression: Arthroplasty changes. No acute findings.  Electronically Signed By-Dr. Mark Gordon MD On:5/30/2024 4:34 AM       Results for orders placed during the hospital encounter of 03/22/16    DUPLEX CAROTID BILATERAL CAR    Interpretation Summary  · Normal bilateral carotid duplex. No stenosis.      Results for orders placed during the hospital encounter of 03/22/16    DUPLEX CAROTID BILATERAL CAR    Interpretation Summary  · Normal bilateral carotid duplex. No stenosis.      Results for orders placed during the hospital encounter of 03/22/16    Adult transthoracic echo COMPLETE    Interpretation Summary  · All left ventricular wall segments contract normally.  · Left ventricular function is normal. Estimated EF = 62%.  · Left atrial cavity size is moderately dilated.  · Mild-to-moderate mitral valve regurgitation is present  · Mild aortic valve regurgitation is present.  · Mild to moderate tricuspid valve regurgitation is present.      Labs Pending at Discharge:  Pending Labs       Order Current Status    Blood Culture - Blood, Arm, Left Preliminary result    Blood Culture - Blood, Arm, Right Preliminary result              Time spent on Discharge including face to face service:  35 minutes    Electronically signed by Arnaud Villaseñor MD, 06/03/24, 1:45 PM EDT.    Portions of  this documentation were transcribed electronically from a voice recognition software.  I confirm all data accurately represents the service(s) I performed at today's visit.

## 2024-06-03 NOTE — PLAN OF CARE
Goal Outcome Evaluation:         Patient being DC to [facility] [place]. Report was given to VANIA Torres. Patient's daughter/POA was educated on discharge instructions and discharge medications. Daughter verbalized understanding and was given written hand out. IV removed. Awaiting EMS

## 2024-06-03 NOTE — SIGNIFICANT NOTE
06/03/24 1214   Plan   Plan Patient is ready for discharge today per hospitalist. Advised that patient would be returning to [facility] [place].  has emailed facility to see if patient can admit today. Will update hospitalist.

## 2024-06-03 NOTE — SIGNIFICANT NOTE
Wound Eval / Progress Noted    [facility]     Patient Name: Keyana Nguyen  : 1925  MRN: [id number]  Today's Date: 6/3/2024                 Admit Date: 2024    Visit Dx:    ICD-10-CM ICD-9-CM   1. Altered mental status, unspecified altered mental status type  R41.82 780.97   2. Fall, initial encounter  W19.XXXA E888.9   3. Multiple skin tears  T14.8XXA 879.8   4. Oropharyngeal dysphagia  R13.12 787.22   5. Decreased activities of daily living (ADL)  Z78.9 V49.89   6. Difficulty walking  R26.2 719.7         * No active hospital problems. *        Past Medical History:   Diagnosis Date    Aortic regurgitation     Atrial fibrillation     Breast cancer     Essential hypertension     Fatigue     Herpes zoster with complication 10/3/2016    Hypothyroidism     Irritable bowel syndrome     Mitral valve insufficiency     Nonrheumatic aortic (valve) insufficiency     Osteopenia of the elderly     Postmenopausal status     Vitamin B12 deficiency     Vitamin D deficiency         Past Surgical History:   Procedure Laterality Date    BREAST BIOPSY      BREAST SURGERY      HYSTERECTOMY      MASTECTOMY      OOPHORECTOMY      ORTHOPEDIC SURGERY      TONSILLECTOMY AND ADENOIDECTOMY      TOTAL KNEE ARTHROPLASTY           Physical Assessment:  Wound 24 0736 Left distal thigh (Active)   Wound Image   24 1120   Dressing Appearance intact;dry 24 1120   Closure None 24 1120   Base red;moist 24 1120   Periwound intact;ecchymotic;dry;pink 24 1120   Periwound Temperature warm 24 1120   Periwound Skin Turgor soft 24 1120   Edges rolled/closed;open 24 1120   Wound Length (cm) 5.2 cm 24 1120   Wound Width (cm) 3 cm 24 1120   Wound Depth (cm) 0.1 cm 24 1120   Wound Surface Area (cm^2) 15.6 cm^2 24 1120   Wound Volume (cm^3) 1.56 cm^3 24 1120   Drainage Characteristics/Odor serosanguineous;malodorous 24 1120   Drainage Amount small 24  1120   Care, Wound cleansed with;sterile normal saline 06/03/24 1120   Dressing Care dressing applied;non-adherent;petroleum-based;gauze;silver impregnated;hydrofiber;silicone;border dressing 06/03/24 1120   Periwound Care absorptive dressing applied 06/03/24 1120       Wound 05/30/24 1038 Left anterior leg Skin Tear (Active)   Dressing Appearance intact;dry 06/03/24 1120   Closure None 06/03/24 1120   Base red;moist 06/03/24 1120   Red (%), Wound Tissue Color 100 06/03/24 1120   Periwound intact;dry;ecchymotic 06/03/24 1120   Periwound Temperature warm 06/03/24 1120   Periwound Skin Turgor soft 06/03/24 1120   Edges rolled/closed;open 06/03/24 1120   Drainage Characteristics/Odor serosanguineous 06/03/24 1120   Drainage Amount small 06/03/24 1120   Care, Wound cleansed with;sterile normal saline 06/03/24 1120   Dressing Care dressing applied;silver impregnated;hydrofiber;silicone;border dressing 06/03/24 1120   Periwound Care absorptive dressing applied 06/03/24 1120       Wound 05/30/24 1038 Right posterior elbow Skin Tear (Active)   Wound Image   06/03/24 1120   Dressing Appearance intact;dry 06/03/24 1120   Closure None 06/03/24 1120   Base red;moist;scab 06/03/24 1120   Periwound intact;dry;pink 06/03/24 1120   Periwound Temperature warm 06/03/24 1120   Periwound Skin Turgor soft 06/03/24 1120   Edges rolled/closed 06/03/24 1120   Drainage Characteristics/Odor malodorous;serosanguineous;creamy;brown;green 06/02/24 2306   Drainage Amount none 06/03/24 1120   Care, Wound cleansed with;sterile normal saline 06/03/24 1120   Dressing Care dressing applied;non-adherent;petroleum-based;gauze;silicone;border dressing 06/03/24 1120   Periwound Care absorptive dressing applied 06/03/24 1120       Wound 05/31/24 1005 Left posterior elbow Skin Tear (Active)   Wound Image   06/03/24 1120   Dressing Appearance intact;dry 06/03/24 1120   Closure None 06/03/24 1120   Base pink;moist;red 06/03/24 1120   Periwound  intact;dry;ecchymotic 06/03/24 1120   Periwound Temperature warm 06/03/24 1120   Periwound Skin Turgor soft 06/03/24 1120   Edges open 06/03/24 1120   Wound Length (cm) 5 cm 06/03/24 1120   Wound Width (cm) 2 cm 06/03/24 1120   Wound Depth (cm) 0.3 cm 06/03/24 1120   Wound Surface Area (cm^2) 10 cm^2 06/03/24 1120   Wound Volume (cm^3) 3 cm^3 06/03/24 1120   Drainage Characteristics/Odor serosanguineous 06/03/24 1120   Drainage Amount small 06/03/24 1120   Care, Wound cleansed with;sterile normal saline 06/03/24 1120   Dressing Care dressing applied;non-adherent;petroleum-based;gauze;silver impregnated;hydrofiber;silicone;border dressing 06/03/24 1120   Periwound Care absorptive dressing applied 06/03/24 1120       Wound 06/03/24 1436 Left shoulder Skin Tear (Active)   Dressing Appearance moist drainage;dressing loose 06/03/24 1120   Closure None 06/03/24 1120   Base slough;moist;red 06/03/24 1120   Periwound intact;dry;ecchymotic 06/03/24 1120   Periwound Temperature warm 06/03/24 1120   Periwound Skin Turgor soft 06/03/24 1120   Edges open;rolled/closed 06/03/24 1120   Wound Length (cm) 3.9 cm 06/03/24 1120   Wound Width (cm) 1.3 cm 06/03/24 1120   Wound Depth (cm) 0.1 cm 06/03/24 1120   Wound Surface Area (cm^2) 5.07 cm^2 06/03/24 1120   Wound Volume (cm^3) 0.507 cm^3 06/03/24 1120   Drainage Characteristics/Odor green;malodorous 06/03/24 1120   Drainage Amount large 06/03/24 1120   Care, Wound cleansed with;sterile normal saline 06/03/24 1120   Dressing Care dressing applied;non-adherent;petroleum-based;gauze;silver impregnated;hydrofiber;silicone;border dressing 06/03/24 1120   Periwound Care absorptive dressing applied 06/03/24 1120      Wound Check / Follow-up: Patient seen today for wound consult.  Patient is awake alert and oriented at the time of assessment.  Patient reports that she had fallen out of bed.    Category 1 skin tear to the left elbow with a small reddened moist scab present; no visible wound  base present.  Periwound is pink and soft.  Cleanse with normal saline.  Covered with non-adherent petroleum based gauze.  Recommend for perform skin care protocol at this time with every 3 day dressing changes.    Category 2 skin tears present to the left shoulder, left lateral knee, and left lateral lower leg.  Foul odor detected upon removal of the dressing to the left lateral knee and left shoulder; odor dissipated once cleansed with normal saline.  Skin flaps were approximated, minimal red moist wound base is present after reapproximation of skin flap.  Cleanse with normal saline.  Recommend daily dressing changes with non-adherent petroleum based gauze to the wound base and then cover with silver impregnated Hydrofiber.  It is possible that the patient may lose the skin flaps due to trauma but are intact at this time.    Category 3 skin tears to the left elbow and left posterior calf.  Upon removal of dressings, thin yellow sloughing tissue present but was easily removed during cleansing.  Wound bases are red and moist with some pink tissue present.  Periwound is soft and intact, no induration palpated.  Cleanse with normal saline.  Recommend to follow skin care protocol with every 3 day dressing changes.    Buttocks is reddened and blanchable.  Will recommend to implement Q2H turns and offload at all times.  Apply blue top moisture barrier 3 times daily and as needed to the buttocks.  Keep the patient clean and free from all moisture.    Impression: Category 1, 2, 3 skin tears present, blanchable redness to buttocks    Short term goals: Regain skin integrity, skin protection, moisture prevention, pressure reduction, topical treatment, daily dressing changes, every 3 day dressing changes.    April Myers RN    6/3/2024    14:55 EDT

## 2024-06-03 NOTE — PLAN OF CARE
Goal Outcome Evaluation:  Plan of Care Reviewed With: patient        Progress: no change  Outcome Evaluation: plan of care ongoing

## 2024-06-03 NOTE — SIGNIFICANT NOTE
06/03/24 8384   Plan   [facility] is able to accept patient back today. Hospitalist notified.   Final Discharge Disposition Code 03 - skilled nursing facility (SNF)

## 2024-06-04 LAB
BACTERIA SPEC AEROBE CULT: NORMAL
BACTERIA SPEC AEROBE CULT: NORMAL

## 2024-08-20 ENCOUNTER — TRANSCRIBE ORDERS (OUTPATIENT)
Dept: HOME HEALTH SERVICES | Facility: HOME HEALTHCARE | Age: 89
End: 2024-08-20
Payer: MEDICARE

## 2024-08-20 ENCOUNTER — HOME HEALTH ADMISSION (OUTPATIENT)
Dept: HOME HEALTH SERVICES | Facility: HOME HEALTHCARE | Age: 89
End: 2024-08-20
Payer: MEDICARE

## 2024-08-20 DIAGNOSIS — G93.41 METABOLIC ENCEPHALOPATHY: Primary | ICD-10-CM

## 2024-09-10 ENCOUNTER — TELEPHONE (OUTPATIENT)
Dept: PEDIATRICS | Facility: OTHER | Age: 89
End: 2024-09-10
Payer: MEDICARE